# Patient Record
Sex: MALE | Race: WHITE | NOT HISPANIC OR LATINO | Employment: OTHER | URBAN - METROPOLITAN AREA
[De-identification: names, ages, dates, MRNs, and addresses within clinical notes are randomized per-mention and may not be internally consistent; named-entity substitution may affect disease eponyms.]

---

## 2017-02-21 ENCOUNTER — TELEPHONE (OUTPATIENT)
Dept: FAMILY MEDICINE CLINIC | Facility: CLINIC | Age: 67
End: 2017-02-21

## 2017-02-21 RX ORDER — DOXAZOSIN MESYLATE 4 MG/1
4 TABLET ORAL 2 TIMES DAILY
Qty: 60 TABLET | Refills: 1 | Status: SHIPPED | OUTPATIENT
Start: 2017-02-21 | End: 2017-04-10 | Stop reason: SDUPTHER

## 2017-02-21 NOTE — TELEPHONE ENCOUNTER
----- Message from Mariah Ordoñez sent at 2/21/2017 10:05 AM EST -----  Contact: [phone]  PATIENT NEEDS THE NURSE TO GIVE HIM A CALL BACK CONCERNING A ISSUE WITH MEDICATION

## 2017-04-09 DIAGNOSIS — J44.9 CHRONIC OBSTRUCTIVE PULMONARY DISEASE, UNSPECIFIED COPD TYPE (HCC): ICD-10-CM

## 2017-04-10 ENCOUNTER — TELEPHONE (OUTPATIENT)
Dept: FAMILY MEDICINE CLINIC | Facility: CLINIC | Age: 67
End: 2017-04-10

## 2017-04-10 RX ORDER — DOXAZOSIN MESYLATE 4 MG/1
4 TABLET ORAL 2 TIMES DAILY
Qty: 60 TABLET | Refills: 1 | Status: SHIPPED | OUTPATIENT
Start: 2017-04-10 | End: 2017-06-19 | Stop reason: SDUPTHER

## 2017-04-10 RX ORDER — TIOTROPIUM BROMIDE 18 UG/1
CAPSULE ORAL; RESPIRATORY (INHALATION)
Qty: 30 CAPSULE | Refills: 0 | Status: SHIPPED | OUTPATIENT
Start: 2017-04-10 | End: 2017-05-17 | Stop reason: SDUPTHER

## 2017-04-10 RX ORDER — SILDENAFIL CITRATE 100 MG
TABLET ORAL
Qty: 10 TABLET | Refills: 1 | Status: SHIPPED | OUTPATIENT
Start: 2017-04-10 | End: 2017-09-11

## 2017-04-10 NOTE — TELEPHONE ENCOUNTER
----- Message from Remberto Leone sent at 4/10/2017 11:07 AM EDT -----  Regarding: MED REFILL  Contact: [phone]    PT CALLED FOR MED REFILL    doxazosin (CARDURA) 4 MG tablet    BREO ELLIPTA 100-25 MCG/INH aerosol powder       [place], [place]     WOULD LIKE TO BE CALLED WHEN SENT IN

## 2017-04-28 DIAGNOSIS — E29.1 HYPOGONADISM MALE: ICD-10-CM

## 2017-04-28 RX ORDER — TESTOSTERONE CYPIONATE 200 MG/ML
INJECTION, SOLUTION INTRAMUSCULAR
Qty: 2 ML | OUTPATIENT
Start: 2017-04-28

## 2017-05-17 DIAGNOSIS — J44.9 CHRONIC OBSTRUCTIVE PULMONARY DISEASE, UNSPECIFIED COPD TYPE (HCC): ICD-10-CM

## 2017-05-17 RX ORDER — TIOTROPIUM BROMIDE 18 UG/1
CAPSULE ORAL; RESPIRATORY (INHALATION)
Qty: 30 CAPSULE | Refills: 0 | Status: SHIPPED | OUTPATIENT
Start: 2017-05-17 | End: 2017-06-19 | Stop reason: SDUPTHER

## 2017-05-31 ENCOUNTER — OFFICE VISIT (OUTPATIENT)
Dept: FAMILY MEDICINE CLINIC | Facility: CLINIC | Age: 67
End: 2017-05-31

## 2017-05-31 VITALS
SYSTOLIC BLOOD PRESSURE: 110 MMHG | HEIGHT: 67 IN | BODY MASS INDEX: 28.88 KG/M2 | HEART RATE: 65 BPM | OXYGEN SATURATION: 97 % | DIASTOLIC BLOOD PRESSURE: 68 MMHG | TEMPERATURE: 98.1 F | WEIGHT: 184 LBS

## 2017-05-31 DIAGNOSIS — E29.1 HYPOGONADISM MALE: ICD-10-CM

## 2017-05-31 DIAGNOSIS — I10 ESSENTIAL HYPERTENSION: ICD-10-CM

## 2017-05-31 DIAGNOSIS — J43.2 CENTRILOBULAR EMPHYSEMA (HCC): ICD-10-CM

## 2017-05-31 DIAGNOSIS — M25.511 ACUTE PAIN OF RIGHT SHOULDER: ICD-10-CM

## 2017-05-31 DIAGNOSIS — M54.2 NECK PAIN ON RIGHT SIDE: Primary | ICD-10-CM

## 2017-05-31 PROCEDURE — 99214 OFFICE O/P EST MOD 30 MIN: CPT | Performed by: FAMILY MEDICINE

## 2017-05-31 PROCEDURE — 84403 ASSAY OF TOTAL TESTOSTERONE: CPT | Performed by: FAMILY MEDICINE

## 2017-05-31 PROCEDURE — 84402 ASSAY OF FREE TESTOSTERONE: CPT | Performed by: FAMILY MEDICINE

## 2017-05-31 RX ORDER — TESTOSTERONE CYPIONATE 200 MG/ML
300 INJECTION, SOLUTION INTRAMUSCULAR
Qty: 10 ML | Refills: 3
Start: 2017-05-31 | End: 2017-07-20 | Stop reason: SDUPTHER

## 2017-05-31 RX ORDER — ALBUTEROL SULFATE 90 UG/1
2 AEROSOL, METERED RESPIRATORY (INHALATION) EVERY 6 HOURS PRN
Qty: 1 INHALER | Refills: 0 | Status: SHIPPED | OUTPATIENT
Start: 2017-05-31 | End: 2017-07-24 | Stop reason: SDUPTHER

## 2017-06-02 LAB
CONV COMMENT: NORMAL
TESTOST FREE SERPL-MCNC: 7.1 PG/ML (ref 6.6–18.1)
TESTOST SERPL-MCNC: 355 NG/DL (ref 348–1197)

## 2017-06-19 DIAGNOSIS — J44.9 CHRONIC OBSTRUCTIVE PULMONARY DISEASE, UNSPECIFIED COPD TYPE (HCC): ICD-10-CM

## 2017-06-19 RX ORDER — DOXAZOSIN MESYLATE 4 MG/1
TABLET ORAL
Qty: 60 TABLET | Refills: 0 | Status: SHIPPED | OUTPATIENT
Start: 2017-06-19 | End: 2017-07-24 | Stop reason: SDUPTHER

## 2017-06-19 RX ORDER — TIOTROPIUM BROMIDE 18 UG/1
CAPSULE ORAL; RESPIRATORY (INHALATION)
Qty: 60 CAPSULE | Refills: 1 | Status: SHIPPED | OUTPATIENT
Start: 2017-06-19 | End: 2017-07-24 | Stop reason: SDUPTHER

## 2017-07-13 ENCOUNTER — HOSPITAL ENCOUNTER (OUTPATIENT)
Dept: GENERAL RADIOLOGY | Facility: HOSPITAL | Age: 67
Discharge: HOME | End: 2017-07-13
Attending: FAMILY MEDICINE | Admitting: FAMILY MEDICINE

## 2017-07-13 DIAGNOSIS — M54.2 NECK PAIN ON RIGHT SIDE: ICD-10-CM

## 2017-07-13 DIAGNOSIS — M25.511 ACUTE PAIN OF RIGHT SHOULDER: ICD-10-CM

## 2017-07-13 PROCEDURE — 72050 X-RAY EXAM NECK SPINE 4/5VWS: CPT

## 2017-07-14 ENCOUNTER — TELEPHONE (OUTPATIENT)
Dept: FAMILY MEDICINE CLINIC | Facility: CLINIC | Age: 67
End: 2017-07-14

## 2017-07-14 NOTE — TELEPHONE ENCOUNTER
----- Message from Remberto Leone sent at 7/14/2017  3:18 PM EDT -----  Regarding: med question   Contact: [phone]    Pt called to check status of med refill and if insurance had been contacted to cover them     Testosterone Cypionate (DEPOTESTOTERONE CYPIONATE) 200 MG/ML injection

## 2017-07-14 NOTE — TELEPHONE ENCOUNTER
----- Message from Remberto Leone sent at 7/14/2017  3:18 PM EDT -----  Regarding: med question   Contact: [phone]    Pt called to check status of med refill and if insurance had been contacted to cover them     Testosterone Cypionate (DEPOTESTOTERONE CYPIONATE) 200 MG/ML injection

## 2017-07-17 ENCOUNTER — TELEPHONE (OUTPATIENT)
Dept: FAMILY MEDICINE CLINIC | Facility: CLINIC | Age: 67
End: 2017-07-17

## 2017-07-17 NOTE — TELEPHONE ENCOUNTER
----- Message from Mariah Ordoñez sent at 2017  4:26 PM EDT -----  Contact: [phone]  PATIENT SAYS HE WAS OUT OF TOWN AND NEEDS A NEW PT ORDER SINCE HIS IS NOW  SINCE IT IS 30 DAYS OLD

## 2017-07-18 ENCOUNTER — TRANSCRIBE ORDERS (OUTPATIENT)
Dept: PHYSICAL THERAPY | Facility: CLINIC | Age: 67
End: 2017-07-18

## 2017-07-18 DIAGNOSIS — M54.2 PAIN, NECK: Primary | ICD-10-CM

## 2017-07-20 DIAGNOSIS — E29.1 HYPOGONADISM MALE: ICD-10-CM

## 2017-07-20 RX ORDER — TESTOSTERONE CYPIONATE 200 MG/ML
300 INJECTION, SOLUTION INTRAMUSCULAR
Qty: 10 ML | Refills: 3
Start: 2017-07-20 | End: 2017-12-07 | Stop reason: SDUPTHER

## 2017-07-24 DIAGNOSIS — J44.9 CHRONIC OBSTRUCTIVE PULMONARY DISEASE, UNSPECIFIED COPD TYPE (HCC): ICD-10-CM

## 2017-07-24 DIAGNOSIS — J43.2 CENTRILOBULAR EMPHYSEMA (HCC): ICD-10-CM

## 2017-07-24 RX ORDER — DOXAZOSIN MESYLATE 4 MG/1
4 TABLET ORAL NIGHTLY
Qty: 60 TABLET | Refills: 3 | Status: SHIPPED | OUTPATIENT
Start: 2017-07-24 | End: 2017-09-11 | Stop reason: SDUPTHER

## 2017-07-24 RX ORDER — ALBUTEROL SULFATE 90 UG/1
2 AEROSOL, METERED RESPIRATORY (INHALATION) EVERY 6 HOURS PRN
Qty: 1 INHALER | Refills: 3 | Status: SHIPPED | OUTPATIENT
Start: 2017-07-24 | End: 2019-01-07 | Stop reason: SDUPTHER

## 2017-07-26 ENCOUNTER — TREATMENT (OUTPATIENT)
Dept: PHYSICAL THERAPY | Facility: CLINIC | Age: 67
End: 2017-07-26

## 2017-07-26 DIAGNOSIS — S16.1XXS CERVICAL STRAIN, SEQUELA: Primary | ICD-10-CM

## 2017-07-26 PROCEDURE — 97530 THERAPEUTIC ACTIVITIES: CPT | Performed by: PHYSICAL THERAPIST

## 2017-07-26 PROCEDURE — G8981 BODY POS CURRENT STATUS: HCPCS | Performed by: PHYSICAL THERAPIST

## 2017-07-26 PROCEDURE — 97110 THERAPEUTIC EXERCISES: CPT | Performed by: PHYSICAL THERAPIST

## 2017-07-26 PROCEDURE — 97161 PT EVAL LOW COMPLEX 20 MIN: CPT | Performed by: PHYSICAL THERAPIST

## 2017-07-26 PROCEDURE — G8982 BODY POS GOAL STATUS: HCPCS | Performed by: PHYSICAL THERAPIST

## 2017-07-26 NOTE — PROGRESS NOTES
Physical Therapy Initial Evaluation and Plan of Care      Patient: Rey Garrison   : 1950  Diagnosis/ICD-10 Code:  No primary diagnosis found.  Referring practitioner: Sebastian Lovett MD    Subjective Evaluation    History of Present Illness  Mechanism of injury:  R cervical spine pain periodically.  It has been getting worse and more frequent.  He feels stiffness and pain in the R Cervical spine into the R shoulder.  Pain in the neck with pushing, riding , sitting in certain chairs. Laying down helps relieve the pain.    Pain  Current pain ratin  At worst pain ratin (within the past week. )  Quality: dull ache, pulling and throbbing  Relieving factors: change in position, rest, relaxation and support    Hand dominance: left             Objective     Postural Observations  Seated posture: fair  Standing posture: fair  Correction of posture: makes symptoms better    Additional Postural Observation Details  Pt with forward head posture, rounded and elevated shoulders R greater than L.      Palpation     Right   Hypertonic in the levator scapulae, lower trapezius, middle trapezius, rhomboids and upper trapezius. Tenderness of the levator scapulae, lower trapezius, middle trapezius, rhomboids and upper trapezius.   Trigger point to levator scapulae.     Neurological Testing   Sensation   Cervical/Thoracic   Left   Intact: light touch    Right   Intact: light touch    Active Range of Motion   Cervical/Thoracic Spine   Cervical    Left lateral flexion: 20 degrees   Right lateral flexion: 10 degrees with pain  Left rotation: 45 degrees with pain  Right rotation: 40 (R cervcial spine) degrees with pain    Right Shoulder   Flexion: 150 degrees with pain    Passive Range of Motion     Additional Passive Range of Motion Details  PROM improved once MM relaxed on the R UT.  PT had minimal hypomobility noted on the R cervical spine due to MM hypertonicity and MM guarding.   R shoulder PROM limited  on the R by MM guarding and impingement.    Scapular Mobility     Right Shoulder   Scapular mobility: poor  Scapular Mobility with Shoulder to 90° FF   Scapular elevation: moderate  Upward rotation: excessive    Scapular Mobility beyond 90° FF   Scapular elevation: moderate  Upward rotation: excessive    Tests   Cervical     Left   Positive cervical distraction (relief of pain and tightness).          Assessment & Plan     Assessment  Impairments: abnormal muscle firing, abnormal muscle tone, abnormal or restricted ROM, activity intolerance, impaired physical strength, lacks appropriate home exercise program and pain with function  Assessment details: Patient is a 67 year old male who comes to physical therapy with chronic neck issues. Signs and symptoms are consistent with postural dysfuction and scapular MM hypertonicity with poor scapular mechanics and strength.  The patient currently has pain, decreased ROM, decreased strength, and inability to perform all essential functional activities. Pt will benefit from skilled PT services to address the above issues.     Prognosis: good  Prognosis details:   SHORT TERM GOALS:     2 weeks  1. Pt I w/ HEP  2. Pt to demonstrate PROM of the right shoulder to WFL to improve ability to perform ADL's  3. Pt to demonstrate ability to perform 30 minutes continuous activity in the clinic without increase in pain     LONG TERM GOALS:   6 weeks  1. Pt to demonstrate AROM of the right shoulder and the cervical spine to WNL to allow ability to perform all necessary functional activities  2. Pt to demonstrate ability to lift 5# OH with the right arm without increase in pain  3. Pt to report being able to work full duty without increase in pain in the shoulder  4. Pt to tolerate 60 minutes continuous activity in the clinic without increase in pain       Plan  Therapy options: will be seen for skilled physical therapy services  Planned modality interventions: cryotherapy, electrical  stimulation/Russian stimulation, thermotherapy (hydrocollator packs), ultrasound and traction  Planned therapy interventions: therapeutic activities, stretching, strengthening, soft tissue mobilization, postural training, motor coordination training, manual therapy, ADL retraining, body mechanics training, flexibility, functional ROM exercises, home exercise program, joint mobilization and IADL retraining  Treatment plan discussed with: patient  Plan details: Pt will be seen for PT 1 month when he returns from vacation.  He was given HEP and cervical pillow.    Functional Limitations: carrying objects, lifting, sleeping, pulling, pushing, uncomfortable because of pain, reaching behind back, reaching overhead and unable to perform repetitive tasks      Manual Therapy:    8     mins  38737;  Therapeutic Exercise:    12     mins  35655;     Neuromuscular Maryellen:        mins  70862;    Therapeutic Activity:     6     mins  78799;     Gait Training:           mins  47950;     Ultrasound:          mins  66502;    Electrical Stimulation:         mins  67788 ( );  Dry Needling          mins self-pay    Timed Treatment:   26   mins   Total Treatment:     53   mins    PT SIGNATURE: Daniel Galvin, PT   DATE TREATMENT INITIATED: 7/26/2017    Initial Certification  Certification Period: 10/24/2017  I certify that the therapy services are furnished while this patient is under my care.  The services outlined above are required by this patient, and will be reviewed every 90 days.     PHYSICIAN: Sebastian Lovett MD      DATE:     Please sign and return via fax to  .. Thank you, [facility].

## 2017-09-11 ENCOUNTER — OFFICE VISIT (OUTPATIENT)
Dept: FAMILY MEDICINE CLINIC | Facility: CLINIC | Age: 67
End: 2017-09-11

## 2017-09-11 VITALS
SYSTOLIC BLOOD PRESSURE: 124 MMHG | BODY MASS INDEX: 29.19 KG/M2 | TEMPERATURE: 98.7 F | HEART RATE: 64 BPM | WEIGHT: 186 LBS | OXYGEN SATURATION: 94 % | DIASTOLIC BLOOD PRESSURE: 62 MMHG | HEIGHT: 67 IN

## 2017-09-11 DIAGNOSIS — R29.898 WEAKNESS OF RIGHT LOWER EXTREMITY: ICD-10-CM

## 2017-09-11 DIAGNOSIS — N40.1 BENIGN NON-NODULAR PROSTATIC HYPERPLASIA WITH LOWER URINARY TRACT SYMPTOMS: ICD-10-CM

## 2017-09-11 DIAGNOSIS — R31.9 HEMATURIA: Primary | ICD-10-CM

## 2017-09-11 LAB
ALBUMIN SERPL-MCNC: 4.6 G/DL (ref 3.2–4.8)
ALBUMIN/GLOB SERPL: 1.8 G/DL (ref 1.5–2.5)
ALP SERPL-CCNC: 74 U/L (ref 25–100)
ALT SERPL W P-5'-P-CCNC: 28 U/L (ref 7–40)
ANION GAP SERPL CALCULATED.3IONS-SCNC: 8 MMOL/L (ref 3–11)
AST SERPL-CCNC: 22 U/L (ref 0–33)
BASOPHILS # BLD AUTO: 0.04 10*3/MM3 (ref 0–0.2)
BASOPHILS NFR BLD AUTO: 0.5 % (ref 0–1)
BILIRUB BLD-MCNC: NEGATIVE MG/DL
BILIRUB SERPL-MCNC: 0.4 MG/DL (ref 0.3–1.2)
BUN BLD-MCNC: 20 MG/DL (ref 9–23)
BUN/CREAT SERPL: 25 (ref 7–25)
CALCIUM SPEC-SCNC: 9.6 MG/DL (ref 8.7–10.4)
CHLORIDE SERPL-SCNC: 104 MMOL/L (ref 99–109)
CLARITY, POC: CLEAR
CO2 SERPL-SCNC: 28 MMOL/L (ref 20–31)
COLOR UR: YELLOW
CREAT BLD-MCNC: 0.8 MG/DL (ref 0.6–1.3)
DEPRECATED RDW RBC AUTO: 46.3 FL (ref 37–54)
EOSINOPHIL # BLD AUTO: 0.17 10*3/MM3 (ref 0–0.3)
EOSINOPHIL NFR BLD AUTO: 2.1 % (ref 0–3)
ERYTHROCYTE [DISTWIDTH] IN BLOOD BY AUTOMATED COUNT: 12.9 % (ref 11.3–14.5)
GFR SERPL CREATININE-BSD FRML MDRD: 97 ML/MIN/1.73
GLOBULIN UR ELPH-MCNC: 2.5 GM/DL
GLUCOSE BLD-MCNC: 95 MG/DL (ref 70–100)
GLUCOSE UR STRIP-MCNC: NEGATIVE MG/DL
HCT VFR BLD AUTO: 52.8 % (ref 38.9–50.9)
HGB BLD-MCNC: 18.4 G/DL (ref 13.1–17.5)
IMM GRANULOCYTES # BLD: 0.03 10*3/MM3 (ref 0–0.03)
IMM GRANULOCYTES NFR BLD: 0.4 % (ref 0–0.6)
KETONES UR QL: NEGATIVE
LEUKOCYTE EST, POC: NEGATIVE
LYMPHOCYTES # BLD AUTO: 2.37 10*3/MM3 (ref 0.6–4.8)
LYMPHOCYTES NFR BLD AUTO: 28.9 % (ref 24–44)
MCH RBC QN AUTO: 34.2 PG (ref 27–31)
MCHC RBC AUTO-ENTMCNC: 34.8 G/DL (ref 32–36)
MCV RBC AUTO: 98.1 FL (ref 80–99)
MONOCYTES # BLD AUTO: 0.7 10*3/MM3 (ref 0–1)
MONOCYTES NFR BLD AUTO: 8.5 % (ref 0–12)
NEUTROPHILS # BLD AUTO: 4.88 10*3/MM3 (ref 1.5–8.3)
NEUTROPHILS NFR BLD AUTO: 59.6 % (ref 41–71)
NITRITE UR-MCNC: NEGATIVE MG/ML
PH UR: 7.5 [PH] (ref 5–8)
PLATELET # BLD AUTO: 129 10*3/MM3 (ref 150–450)
PMV BLD AUTO: 11 FL (ref 6–12)
POTASSIUM BLD-SCNC: 4.4 MMOL/L (ref 3.5–5.5)
PROT SERPL-MCNC: 7.1 G/DL (ref 5.7–8.2)
PROT UR STRIP-MCNC: NEGATIVE MG/DL
PSA SERPL-MCNC: 0.92 NG/ML (ref 0–4)
RBC # BLD AUTO: 5.38 10*6/MM3 (ref 4.2–5.76)
RBC # UR STRIP: ABNORMAL /UL
SODIUM BLD-SCNC: 140 MMOL/L (ref 132–146)
SP GR UR: 1.01 (ref 1–1.03)
UROBILINOGEN UR QL: NORMAL
WBC NRBC COR # BLD: 8.19 10*3/MM3 (ref 3.5–10.8)

## 2017-09-11 PROCEDURE — 84153 ASSAY OF PSA TOTAL: CPT | Performed by: FAMILY MEDICINE

## 2017-09-11 PROCEDURE — 80053 COMPREHEN METABOLIC PANEL: CPT | Performed by: FAMILY MEDICINE

## 2017-09-11 PROCEDURE — 99214 OFFICE O/P EST MOD 30 MIN: CPT | Performed by: FAMILY MEDICINE

## 2017-09-11 PROCEDURE — 85025 COMPLETE CBC W/AUTO DIFF WBC: CPT | Performed by: FAMILY MEDICINE

## 2017-09-11 PROCEDURE — 81003 URINALYSIS AUTO W/O SCOPE: CPT | Performed by: FAMILY MEDICINE

## 2017-09-11 PROCEDURE — 87086 URINE CULTURE/COLONY COUNT: CPT | Performed by: FAMILY MEDICINE

## 2017-09-11 PROCEDURE — 36415 COLL VENOUS BLD VENIPUNCTURE: CPT | Performed by: FAMILY MEDICINE

## 2017-09-11 RX ORDER — DOXAZOSIN MESYLATE 4 MG/1
8 TABLET ORAL EVERY MORNING
Qty: 60 TABLET | Refills: 11 | Status: SHIPPED | OUTPATIENT
Start: 2017-09-11 | End: 2018-10-04 | Stop reason: SDUPTHER

## 2017-09-11 NOTE — PROGRESS NOTES
"Subjective   Rey Garrison is a 67 y.o. male.     History of Present Illness   The patient is here today with c/o blood in his urine.  States he increased the Doxazosin to two tablets daily and ran out early and was off it for a few days.    States he has had a swollen lymph node in the right axilla.    Also c/o feeling like he has right foot drop.  He has noticed he has had some change in his gait.    The following portions of the patient's history were reviewed and updated as appropriate: allergies, current medications, past social history and problem list.    Review of Systems   Respiratory: Negative for shortness of breath.    Cardiovascular: Negative for chest pain.   Gastrointestinal: Negative for abdominal pain.   Genitourinary: Positive for hematuria.   Neurological: Positive for weakness (right lower extremity).       Objective   /62  Pulse 64  Temp 98.7 °F (37.1 °C)  Ht 67\" (170.2 cm)  Wt 186 lb (84.4 kg)  SpO2 94%  BMI 29.13 kg/m2  Physical Exam   Constitutional: He is oriented to person, place, and time. He appears well-developed and well-nourished.   Eyes: Pupils are equal, round, and reactive to light.   Cardiovascular: Normal rate, regular rhythm, normal heart sounds and intact distal pulses.    No murmur heard.  Pulmonary/Chest: Effort normal and breath sounds normal. No respiratory distress. He has no wheezes. He has no rales.   Lymphadenopathy:     He has no cervical adenopathy.   Neurological: He is alert and oriented to person, place, and time. He has normal reflexes.   Quadriceps strength testing on the right lower extremity reveals it to be grade 4 out of 5 compared to the left.  Hamstring testing on the right side is grade 4 out of 5 compared to the left side.  Dorsiflexion testing of the right foot reveals strength there to be grade 3 out of 5 compared to the left.  Plantar flexion testing of the right lower extremity reveals it to be grade 3 out of 5 compared to the left lower " extremity.   Nursing note and vitals reviewed.      Assessment/Plan   Problem List Items Addressed This Visit        Genitourinary    Benign prostatic hypertrophy      Other Visit Diagnoses     Hematuria    -  Primary    Relevant Orders    POCT urinalysis dipstick, automated (Completed)    Weakness of right lower extremity            Urinalysis today reveals a moderate amount of blood.  This is present on testing 2 years ago.  We'll check a urine culture.  Because of the ongoing hematuria we will check a CT of the abdomen and of the pelvis with and without contrast.    He is had persistent weakness of the right lower extremity.  He believes this is related to some catching in his right hip.  We will check an AP and lateral x-ray of the right hip.    We'll also check a CBC PSA and metabolic panel.  His PSA one year ago was 1.2.  Because of prostate symptoms will increase the doxazosin dosage to 4 mg tablets 2 each night.      Drink plenty fluids.    Continue medications as doing.    Check a PSA,CBC,CMP and Urine culture.    Schedule for a CT scan of the abdomen and pelvis today if possible.  Check an Xray of the Right hip.    Follow up as needed.  The patient will call us later this week.  Should the above studies reveal abnormalities we'll have him return to see us here at the office.          Scribed for Dr Sebastian Lovett by Christiana Rasmussen CMA.            I, Sebastian Lovett MD, personally performed the services described in this documentation, as scribed by Christiana Rasmussen in my presence, and is both accurate and complete.

## 2017-09-13 LAB — BACTERIA SPEC AEROBE CULT: NORMAL

## 2017-09-14 ENCOUNTER — HOSPITAL ENCOUNTER (OUTPATIENT)
Dept: GENERAL RADIOLOGY | Facility: HOSPITAL | Age: 67
Discharge: HOME | End: 2017-09-14
Attending: FAMILY MEDICINE

## 2017-09-14 ENCOUNTER — HOSPITAL ENCOUNTER (OUTPATIENT)
Dept: CT IMAGING | Facility: HOSPITAL | Age: 67
Discharge: HOME | End: 2017-09-14
Attending: FAMILY MEDICINE | Admitting: FAMILY MEDICINE

## 2017-09-14 ENCOUNTER — TELEPHONE (OUTPATIENT)
Dept: FAMILY MEDICINE CLINIC | Facility: CLINIC | Age: 67
End: 2017-09-14

## 2017-09-14 DIAGNOSIS — R31.9 HEMATURIA: ICD-10-CM

## 2017-09-14 DIAGNOSIS — R29.898 WEAKNESS OF RIGHT LOWER EXTREMITY: ICD-10-CM

## 2017-09-14 PROCEDURE — 74178 CT ABD&PLV WO CNTR FLWD CNTR: CPT

## 2017-09-14 PROCEDURE — 73502 X-RAY EXAM HIP UNI 2-3 VIEWS: CPT

## 2017-09-14 PROCEDURE — 0 IOPAMIDOL 61 % SOLUTION: Performed by: FAMILY MEDICINE

## 2017-09-14 RX ADMIN — IOPAMIDOL 95 ML: 612 INJECTION, SOLUTION INTRAVENOUS at 12:50

## 2017-09-14 NOTE — TELEPHONE ENCOUNTER
I spoke to the patient on the telephone.  I advised him that the CT of his abdomen did indeed reveal a large stone in the pelvis of the left kidney.  This is the likely cause of his hematuria.  Recommended referral to a urologist.  I gave him the names of foot to the groups that we use and he will give consideration to this and then call us back when he is ready to 4 referral.  His laboratory work also was remarkable for some polycythemia with a hemoglobin of 18.  He does need reevaluation of his obstructive sleep apnea.  He will call us back when he is ready to get set up for a sleep study.  Her main or of his laboratory studies were unremarkable.

## 2017-09-15 DIAGNOSIS — N20.1 URETERAL STONE: Primary | ICD-10-CM

## 2017-10-02 DIAGNOSIS — G47.33 OSA (OBSTRUCTIVE SLEEP APNEA): Primary | ICD-10-CM

## 2017-10-20 ENCOUNTER — APPOINTMENT (OUTPATIENT)
Dept: CT IMAGING | Facility: HOSPITAL | Age: 67
End: 2017-10-20

## 2017-10-20 ENCOUNTER — HOSPITAL ENCOUNTER (EMERGENCY)
Facility: HOSPITAL | Age: 67
Discharge: HOME | End: 2017-10-20
Attending: EMERGENCY MEDICINE | Admitting: EMERGENCY MEDICINE

## 2017-10-20 VITALS
HEART RATE: 68 BPM | HEIGHT: 68 IN | DIASTOLIC BLOOD PRESSURE: 71 MMHG | SYSTOLIC BLOOD PRESSURE: 118 MMHG | TEMPERATURE: 98.4 F | BODY MASS INDEX: 28.04 KG/M2 | RESPIRATION RATE: 18 BRPM | OXYGEN SATURATION: 94 % | WEIGHT: 185 LBS

## 2017-10-20 DIAGNOSIS — R33.9 URINARY RETENTION: Primary | ICD-10-CM

## 2017-10-20 DIAGNOSIS — N99.89 POSTOPERATIVE SURGICAL COMPLICATION INVOLVING GENITOURINARY SYSTEM ASSOCIATED WITH GENITOURINARY PROCEDURE, UNSPECIFIED COMPLICATION: ICD-10-CM

## 2017-10-20 LAB
ANION GAP SERPL CALCULATED.3IONS-SCNC: 15.1 MMOL/L
BACTERIA UR QL AUTO: ABNORMAL /HPF
BASOPHILS # BLD AUTO: 0.07 10*3/MM3 (ref 0–0.2)
BASOPHILS NFR BLD AUTO: 0.5 % (ref 0–2.5)
BILIRUB UR QL STRIP: NEGATIVE
BUN BLD-MCNC: 14 MG/DL (ref 7–20)
BUN/CREAT SERPL: 15.6 (ref 6.3–21.9)
CALCIUM SPEC-SCNC: 9.4 MG/DL (ref 8.4–10.2)
CHLORIDE SERPL-SCNC: 99 MMOL/L (ref 98–107)
CLARITY UR: ABNORMAL
CO2 SERPL-SCNC: 26 MMOL/L (ref 26–30)
COLOR UR: ABNORMAL
CREAT BLD-MCNC: 0.9 MG/DL (ref 0.6–1.3)
DEPRECATED RDW RBC AUTO: 45.2 FL (ref 37–54)
EOSINOPHIL # BLD AUTO: 0.1 10*3/MM3 (ref 0–0.7)
EOSINOPHIL NFR BLD AUTO: 0.7 % (ref 0–7)
ERYTHROCYTE [DISTWIDTH] IN BLOOD BY AUTOMATED COUNT: 13 % (ref 11.5–14.5)
GFR SERPL CREATININE-BSD FRML MDRD: 85 ML/MIN/1.73
GLUCOSE BLD-MCNC: 117 MG/DL (ref 74–98)
GLUCOSE UR STRIP-MCNC: NEGATIVE MG/DL
HCT VFR BLD AUTO: 47.9 % (ref 42–52)
HGB BLD-MCNC: 16.3 G/DL (ref 14–18)
HGB UR QL STRIP.AUTO: ABNORMAL
HYALINE CASTS UR QL AUTO: ABNORMAL /LPF
IMM GRANULOCYTES # BLD: 0.07 10*3/MM3 (ref 0–0.06)
IMM GRANULOCYTES NFR BLD: 0.5 % (ref 0–0.6)
KETONES UR QL STRIP: NEGATIVE
LEUKOCYTE ESTERASE UR QL STRIP.AUTO: ABNORMAL
LYMPHOCYTES # BLD AUTO: 1.63 10*3/MM3 (ref 0.6–3.4)
LYMPHOCYTES NFR BLD AUTO: 10.6 % (ref 10–50)
MCH RBC QN AUTO: 32.1 PG (ref 27–31)
MCHC RBC AUTO-ENTMCNC: 34 G/DL (ref 30–37)
MCV RBC AUTO: 94.5 FL (ref 80–94)
MONOCYTES # BLD AUTO: 0.76 10*3/MM3 (ref 0–0.9)
MONOCYTES NFR BLD AUTO: 5 % (ref 0–12)
NEUTROPHILS # BLD AUTO: 12.72 10*3/MM3 (ref 2–6.9)
NEUTROPHILS NFR BLD AUTO: 82.7 % (ref 37–80)
NITRITE UR QL STRIP: NEGATIVE
NRBC BLD MANUAL-RTO: 0 /100 WBC (ref 0–0)
PH UR STRIP.AUTO: 6.5 [PH] (ref 5–8)
PLATELET # BLD AUTO: 154 10*3/MM3 (ref 130–400)
PMV BLD AUTO: 10.6 FL (ref 6–12)
POTASSIUM BLD-SCNC: 4.1 MMOL/L (ref 3.5–5.1)
PROT UR QL STRIP: ABNORMAL
RBC # BLD AUTO: 5.07 10*6/MM3 (ref 4.7–6.1)
RBC # UR: ABNORMAL /HPF
REF LAB TEST METHOD: ABNORMAL
SODIUM BLD-SCNC: 136 MMOL/L (ref 137–145)
SP GR UR STRIP: 1.02 (ref 1–1.03)
SQUAMOUS #/AREA URNS HPF: ABNORMAL /HPF
UROBILINOGEN UR QL STRIP: ABNORMAL
WBC NRBC COR # BLD: 15.35 10*3/MM3 (ref 4.8–10.8)
WBC UR QL AUTO: ABNORMAL /HPF

## 2017-10-20 PROCEDURE — 74176 CT ABD & PELVIS W/O CONTRAST: CPT

## 2017-10-20 PROCEDURE — 85025 COMPLETE CBC W/AUTO DIFF WBC: CPT | Performed by: EMERGENCY MEDICINE

## 2017-10-20 PROCEDURE — 87086 URINE CULTURE/COLONY COUNT: CPT | Performed by: EMERGENCY MEDICINE

## 2017-10-20 PROCEDURE — 96360 HYDRATION IV INFUSION INIT: CPT

## 2017-10-20 PROCEDURE — 80048 BASIC METABOLIC PNL TOTAL CA: CPT | Performed by: EMERGENCY MEDICINE

## 2017-10-20 PROCEDURE — 81001 URINALYSIS AUTO W/SCOPE: CPT | Performed by: EMERGENCY MEDICINE

## 2017-10-20 PROCEDURE — 99283 EMERGENCY DEPT VISIT LOW MDM: CPT

## 2017-10-20 RX ADMIN — SODIUM CHLORIDE 1000 ML: 9 INJECTION, SOLUTION INTRAVENOUS at 02:24

## 2017-10-20 NOTE — ED PROVIDER NOTES
Subjective   HPI Comments: 67-year-old male presenting with urinary retention.  He states that earlier this evening he had ESWL and ureteral stenting done.  Afterwards he said only a couple small dribbles of urine.  He has the urge to pee but cannot.  When he does get some urine output is bloody.  He denies any flank pain, abdominal pain, nausea, vomiting, fevers, chills.  He called his urologist and was instructed to come here to the ED for evaluation.      Review of Systems   Constitutional: Negative for chills and fever.   HENT: Negative for congestion, rhinorrhea and sore throat.    Eyes: Negative for pain.   Respiratory: Negative for cough and shortness of breath.    Cardiovascular: Negative for chest pain, palpitations and leg swelling.   Gastrointestinal: Negative for abdominal pain, diarrhea, nausea and vomiting.   Genitourinary: Positive for decreased urine volume, difficulty urinating and hematuria. Negative for discharge, dysuria, penile pain, penile swelling, scrotal swelling and testicular pain.   Musculoskeletal: Negative for arthralgias.   Skin: Negative for rash.   Neurological: Negative for weakness and numbness.   Psychiatric/Behavioral: Negative for behavioral problems.       Past Medical History:   Diagnosis Date   • Benign prostatic hypertrophy    • COPD (chronic obstructive pulmonary disease)    • Coughing up blood     History of   • Depression with anxiety    • Dermatitis    • Hearing loss    • Hiatal hernia    • History of acute pharyngitis    • History of histoplasmosis    • Hypertension    • Hypogonadism male    • Left inguinal hernia    • Mediastinal lymphadenopathy    • TOR (obstructive sleep apnea)    • Sciatica of right side    • Tobacco abuse    • Type 2 diabetes mellitus        Allergies   Allergen Reactions   • Penicillins    • Sulfa Antibiotics        Past Surgical History:   Procedure Laterality Date   • COLONOSCOPY W/ POLYPECTOMY      Pathology consistent with hyperplastic polyp    • INGUINAL HERNIA REPAIR  2014   • KNEE ARTHROSCOPY W/ MENISCAL REPAIR      Medial and lateral meniscus repair       Family History   Problem Relation Age of Onset   • Dementia Mother    • Prostate cancer Father        Social History     Social History   • Marital status:      Spouse name: N/A   • Number of children: N/A   • Years of education: N/A     Social History Main Topics   • Smoking status: Current Every Day Smoker     Packs/day: 1.25     Years: 40.00     Types: Cigarettes   • Smokeless tobacco: Never Used   • Alcohol use 3.0 oz/week     5 Standard drinks or equivalent per week      Comment: 5 alcoholic beverages per week   • Drug use: Yes     Special: Marijuana      Comment: social use   • Sexual activity: Yes     Partners: Male     Other Topics Concern   • None     Social History Narrative           Objective   Physical Exam   Constitutional: He is oriented to person, place, and time. He appears well-developed and well-nourished. No distress.   HENT:   Head: Normocephalic and atraumatic.   Right Ear: External ear normal.   Left Ear: External ear normal.   Nose: Nose normal.   Mouth/Throat: Oropharynx is clear and moist.   Eyes: Conjunctivae and EOM are normal. Pupils are equal, round, and reactive to light.   Neck: Normal range of motion. Neck supple.   Cardiovascular: Normal rate, regular rhythm, normal heart sounds and intact distal pulses.    Pulmonary/Chest: Effort normal and breath sounds normal. No respiratory distress.   Abdominal: Soft. Bowel sounds are normal. He exhibits no distension. There is no tenderness. There is no rebound and no guarding.   Musculoskeletal: Normal range of motion. He exhibits no edema, tenderness or deformity.   Neurological: He is alert and oriented to person, place, and time.   Skin: Skin is warm and dry. No rash noted.   Psychiatric: He has a normal mood and affect. His behavior is normal.   Nursing note and vitals reviewed.      Procedures         ED  Course  ED Course                  MDM  Number of Diagnoses or Management Options  Postoperative surgical complication involving genitourinary system associated with genitourinary procedure, unspecified complication:   Urinary retention:   Diagnosis management comments: 67-year-old male with urinary retention.  Well-developed, well-nourished elderly man in no distress with normal vital signs and an otherwise nonfocal exam.  We'll check labs, CT scan to rule out complications from the procedure, will check a bladder scan and likely place a Jiménez catheter.  Disposition pending workup.    DDX: Urinary retention, UTI, postoperative complication    Labs look pretty good.  UA shows only blood, no signs of infection.  CT scan shows stent in good position without any other acute complications, note made of a large prostate.  Jiménez catheter was placed and drained over 1 L of urine.  He is feeling much better.  We'll discharge home with urology follow-up.      Final diagnoses:   Urinary retention   Postoperative surgical complication involving genitourinary system associated with genitourinary procedure, unspecified complication            Daniel Patricia MD  10/20/17 [id number]

## 2017-10-21 LAB — BACTERIA SPEC AEROBE CULT: NO GROWTH

## 2017-11-29 DIAGNOSIS — E29.1 HYPOGONADISM MALE: ICD-10-CM

## 2017-11-29 RX ORDER — TESTOSTERONE CYPIONATE 200 MG/ML
INJECTION, SOLUTION INTRAMUSCULAR
Qty: 10 ML | OUTPATIENT
Start: 2017-11-29

## 2017-11-30 DIAGNOSIS — E29.1 HYPOGONADISM MALE: ICD-10-CM

## 2017-11-30 RX ORDER — TESTOSTERONE CYPIONATE 200 MG/ML
300 INJECTION, SOLUTION INTRAMUSCULAR
Qty: 10 ML | Refills: 3
Start: 2017-11-30

## 2017-12-07 DIAGNOSIS — E29.1 HYPOGONADISM MALE: ICD-10-CM

## 2017-12-07 RX ORDER — TESTOSTERONE CYPIONATE 200 MG/ML
300 INJECTION, SOLUTION INTRAMUSCULAR
Qty: 10 ML | Refills: 0
Start: 2017-12-07 | End: 2017-12-21 | Stop reason: SDUPTHER

## 2017-12-21 ENCOUNTER — OFFICE VISIT (OUTPATIENT)
Dept: FAMILY MEDICINE CLINIC | Facility: CLINIC | Age: 67
End: 2017-12-21

## 2017-12-21 VITALS
HEART RATE: 78 BPM | TEMPERATURE: 98.3 F | OXYGEN SATURATION: 95 % | BODY MASS INDEX: 27.74 KG/M2 | WEIGHT: 183 LBS | DIASTOLIC BLOOD PRESSURE: 58 MMHG | HEIGHT: 68 IN | SYSTOLIC BLOOD PRESSURE: 118 MMHG

## 2017-12-21 DIAGNOSIS — Z23 IMMUNIZATION DUE: ICD-10-CM

## 2017-12-21 DIAGNOSIS — E29.1 HYPOGONADISM MALE: Primary | ICD-10-CM

## 2017-12-21 PROCEDURE — G0009 ADMIN PNEUMOCOCCAL VACCINE: HCPCS | Performed by: FAMILY MEDICINE

## 2017-12-21 PROCEDURE — 99213 OFFICE O/P EST LOW 20 MIN: CPT | Performed by: FAMILY MEDICINE

## 2017-12-21 PROCEDURE — 90662 IIV NO PRSV INCREASED AG IM: CPT | Performed by: FAMILY MEDICINE

## 2017-12-21 PROCEDURE — G0008 ADMIN INFLUENZA VIRUS VAC: HCPCS | Performed by: FAMILY MEDICINE

## 2017-12-21 PROCEDURE — 90670 PCV13 VACCINE IM: CPT | Performed by: FAMILY MEDICINE

## 2017-12-21 RX ORDER — FINASTERIDE 5 MG/1
TABLET, FILM COATED ORAL
Refills: 3 | COMMUNITY
Start: 2017-10-25 | End: 2019-09-16 | Stop reason: SDUPTHER

## 2017-12-21 RX ORDER — TESTOSTERONE CYPIONATE 200 MG/ML
300 INJECTION, SOLUTION INTRAMUSCULAR
Qty: 10 ML | Refills: 3
Start: 2017-12-21 | End: 2018-03-29 | Stop reason: SDUPTHER

## 2017-12-21 NOTE — PROGRESS NOTES
"Subjective   Rey Garrison Jr is a 67 y.o. male.     History of Present Illness   The patient is here for a follow up on Hypogonadism.    He states he is doing well.  Denies any chest pain or shortness of breath.    Taking Testosterone Cypionate 200 mg/ML 1.5 ML every two weeks.      The following portions of the patient's history were reviewed and updated as appropriate: allergies, current medications, past social history and problem list.    Review of Systems   Constitutional: Negative for fatigue and unexpected weight change.   Respiratory: Negative for shortness of breath.    Cardiovascular: Negative for chest pain.   Genitourinary: Negative for difficulty urinating and dysuria.       Objective   /58  Pulse 78  Temp 98.3 °F (36.8 °C)  Ht 172.7 cm (67.99\")  Wt 83 kg (183 lb)  SpO2 95%  BMI 27.83 kg/m2  Physical Exam   Constitutional: He appears well-developed and well-nourished.   Cardiovascular: Normal rate and regular rhythm.    No murmur heard.  Pulmonary/Chest: Effort normal and breath sounds normal. He has no wheezes. He has no rales.   Abdominal: There is no tenderness.   Nursing note and vitals reviewed.      Assessment/Plan   Problem List Items Addressed This Visit        Endocrine    Hypogonadism male - Primary      Other Visit Diagnoses     Immunization due                  Drink plenty fluids.    Continue medications as doing.    Written Rx for Testosterone Cypionate 200 mg/ML 1.5 ML every 2 weeks #10 ML +3.    Given a Flu vaccine and Prevnar 13 today.    Follow up in 6 months.          Scribed for Dr Sebastian Lovett by Christiana Rasmussen CMA.    "

## 2018-01-09 ENCOUNTER — CONSULT (OUTPATIENT)
Dept: SLEEP MEDICINE | Facility: HOSPITAL | Age: 68
End: 2018-01-09

## 2018-01-09 VITALS
BODY MASS INDEX: 28.4 KG/M2 | DIASTOLIC BLOOD PRESSURE: 70 MMHG | HEIGHT: 68 IN | WEIGHT: 187.39 LBS | HEART RATE: 76 BPM | OXYGEN SATURATION: 93 % | SYSTOLIC BLOOD PRESSURE: 126 MMHG

## 2018-01-09 DIAGNOSIS — R06.83 SNORING: Primary | ICD-10-CM

## 2018-01-09 DIAGNOSIS — J43.2 CENTRILOBULAR EMPHYSEMA (HCC): ICD-10-CM

## 2018-01-09 DIAGNOSIS — Z72.0 TOBACCO ABUSE: ICD-10-CM

## 2018-01-09 DIAGNOSIS — R29.818 SUSPECTED SLEEP APNEA: ICD-10-CM

## 2018-01-09 PROCEDURE — 99204 OFFICE O/P NEW MOD 45 MIN: CPT | Performed by: INTERNAL MEDICINE

## 2018-01-09 NOTE — PROGRESS NOTES
"  Rey Garrison Jr is a 67 y.o. male.   Chief Complaint   Patient presents with   • Sleeping Problem       HPI     67 y.o. male seen in consultation at the request of Sebastian Lovett MD for evaluation of the above.     He has a remote history of obstructive sleep apnea.  He thinks he was diagnosed roughly 12 years ago.  He works CPAP for about 10 years but quit wearing it about 2 years ago.  He says he lost 60 pounds and the machine was increasingly noisy and he felt he probably didn't need it anymore.    He is here since he is concerned that he may still need therapy though he has very little in the way of daytime somnolence.  He does have increasing awakenings at night and has been told he snores loudly though he currently sleeps alone.  He says he feels \"okay\" in the morning.  He has underlying COPD and continues to smoke about a pack and a half of cigarettes per day.    Further details are as follows:    [place] Scale is: 4/24    Estimated average amount of sleep per night: 7  Number of times he wakes up at night: 3  Difficulty falling back asleep: no  It usually takes 5 minutes to go to sleep.  He feels sleepy upon waking up: no  Rotating or night shift work: no    Drowsiness/Sleepiness:  He exhibits the following:  none    Snoring/Breathing:  He exhibits the following:  loud snoring  quits breathing at night  awakens gasping for breath    Reflux:  He describes the following:  none    Narcolepsy:  He exhibits the following:  none    RLS/PLMs:  He describes the following:  moves or jerks during sleep    Insomnia:  He describes the following:  frequent awakenings    Parasomnia:  He exhibits the following:  excessive sweating while sleeping    Neurological:  He has a history of the following:  none    Weight:  Weight change in the last year:  gain: 0 lbs      The patient's relevant past medical, surgical, family, and social history reviewed and updated in Epic as appropriate.    Current medications are: " "  Current Outpatient Prescriptions:   •  albuterol (PROAIR HFA) 108 (90 BASE) MCG/ACT inhaler, Inhale 2 puffs Every 6 (Six) Hours As Needed for Wheezing., Disp: 1 inhaler, Rfl: 3  •  aspirin (ASPIRIN LOW DOSE) 81 MG EC tablet, Take 81 mg by mouth Daily., Disp: , Rfl:   •  BREO ELLIPTA 100-25 MCG/INH aerosol powder , INHALE 1 PUFF BY MOUTH ONCE DAILY, Disp: 60 each, Rfl: 2  •  doxazosin (CARDURA) 4 MG tablet, Take 2 tablets by mouth Every Morning., Disp: 60 tablet, Rfl: 11  •  finasteride (PROSCAR) 5 MG tablet, TAKE 1 TABLET BY MOUTH DAILY, Disp: , Rfl: 3  •  Fluticasone Furoate-Vilanterol (BREO ELLIPTA) 100-25 MCG/INH aerosol powder , Inhale 1 puff Daily., Disp: 28 each, Rfl: 0  •  Syringe 22G X 1\" 3 ML misc, Use one every two weeks to inject the testosterone., Disp: 10 each, Rfl: 1  •  Testosterone Cypionate (DEPOTESTOTERONE CYPIONATE) 200 MG/ML injection, Inject 1.5 mL into the shoulder, thigh, or buttocks Every 14 (Fourteen) Days., Disp: 10 mL, Rfl: 3  •  tiotropium (SPIRIVA HANDIHALER) 18 MCG per inhalation capsule, Place 1 capsule into inhaler and inhale Daily., Disp: 60 capsule, Rfl: 1.    Review of Systems    Review of Systems  ROS documented in patient questionnaire ×12 systems.  Reviewed with patient.  Otherwise negative except as noted in HPI.    Physical Exam    Blood pressure 126/70, pulse 76, height 172.7 cm (67.99\"), weight 85 kg (187 lb 6.3 oz), SpO2 93 %. Body mass index is 28.5 kg/(m^2).    Physical Exam   Constitutional: He is oriented to person, place, and time. He appears well-developed and well-nourished.   HENT:   Head: Normocephalic and atraumatic.   Nose: Nose normal.   Mouth/Throat: Oropharynx is clear and moist. No oropharyngeal exudate.   Class IV airway  Mild pharyngeal irritation   Eyes: Conjunctivae are normal. Pupils are equal, round, and reactive to light. No scleral icterus.   Neck: Neck supple. No tracheal deviation present. No thyromegaly present.   Cardiovascular: Normal rate and " regular rhythm.  Exam reveals no gallop and no friction rub.    No murmur heard.  Pulmonary/Chest: Effort normal. No respiratory distress. He has no wheezes. He has no rales.   Abdominal: Soft. Bowel sounds are normal.   Musculoskeletal: He exhibits no edema or deformity.   Neurological: He is alert and oriented to person, place, and time.   Skin: Skin is warm and dry. No rash noted.   Psychiatric: He has a normal mood and affect. His behavior is normal. Judgment and thought content normal.   Nursing note and vitals reviewed.      ASSESSMENT:    Problem List Items Addressed This Visit        Pulmonary Problems    Snoring - Primary    Relevant Orders    Polysomnography 4 or More Parameters    COPD (chronic obstructive pulmonary disease)       Other    Suspected sleep apnea    Relevant Orders    Polysomnography 4 or More Parameters    Tobacco abuse          History of obstructive sleep apnea and subsequent weight loss and discontinuation of CPAP therapy remotely.  I don't have any of these records.  He now has continued nocturnal awakenings and snoring and is concerned that he may have untreated obstructive sleep apnea    PLAN:    1. Polysomnogram  2. Reinitiate CPAP therapy if appropriate based upon polysomnogram  3. Further weight loss would be useful and we discussed this  4. Smoking cessation  5. Will make further recommendations after the results of his polysomnogram is available.    I have reviewed the results of my evaluation and impression and discussed my recommendations in detail with the patient.      Signed by  Patel Xie MD    January 9, 2018      CC: MD Bony Santiago Timothy R, MD

## 2018-01-24 ENCOUNTER — HOSPITAL ENCOUNTER (OUTPATIENT)
Dept: SLEEP MEDICINE | Facility: HOSPITAL | Age: 68
Discharge: HOME | End: 2018-01-24
Attending: INTERNAL MEDICINE | Admitting: INTERNAL MEDICINE

## 2018-01-24 VITALS
HEART RATE: 73 BPM | BODY MASS INDEX: 29.37 KG/M2 | WEIGHT: 193.78 LBS | HEIGHT: 68 IN | OXYGEN SATURATION: 91 % | SYSTOLIC BLOOD PRESSURE: 128 MMHG | DIASTOLIC BLOOD PRESSURE: 65 MMHG

## 2018-01-24 DIAGNOSIS — R06.83 SNORING: ICD-10-CM

## 2018-01-24 DIAGNOSIS — R29.818 SUSPECTED SLEEP APNEA: ICD-10-CM

## 2018-01-24 PROCEDURE — 95810 POLYSOM 6/> YRS 4/> PARAM: CPT

## 2018-01-24 PROCEDURE — 95810 POLYSOM 6/> YRS 4/> PARAM: CPT | Performed by: INTERNAL MEDICINE

## 2018-02-01 DIAGNOSIS — E29.1 HYPOGONADISM MALE: ICD-10-CM

## 2018-02-01 RX ORDER — SYRINGE WITH NEEDLE, 1 ML 25GX5/8"
SYRINGE, EMPTY DISPOSABLE MISCELLANEOUS
Qty: 10 EACH | Refills: 0 | Status: SHIPPED | OUTPATIENT
Start: 2018-02-01 | End: 2018-11-05

## 2018-02-07 ENCOUNTER — TELEPHONE (OUTPATIENT)
Dept: FAMILY MEDICINE CLINIC | Facility: CLINIC | Age: 68
End: 2018-02-07

## 2018-02-07 NOTE — TELEPHONE ENCOUNTER
Spoke with Logan and confirmed the substitution.  ----- Message from Louisa Dawson sent at 2/6/2018 12:18 PM EST -----  Regarding: CHANGE GAUGE OF NEEDLE  Contact: [phone]  LOGAN WITH [facility] WANTS TO KNOW IF WE CAN SUBSTITUTE TO 23 GAUGE.  CALL PHARMACY

## 2018-03-20 ENCOUNTER — OFFICE VISIT (OUTPATIENT)
Dept: SLEEP MEDICINE | Facility: HOSPITAL | Age: 68
End: 2018-03-20

## 2018-03-20 VITALS
HEIGHT: 68 IN | HEART RATE: 64 BPM | OXYGEN SATURATION: 93 % | WEIGHT: 193 LBS | SYSTOLIC BLOOD PRESSURE: 138 MMHG | DIASTOLIC BLOOD PRESSURE: 74 MMHG | BODY MASS INDEX: 29.25 KG/M2

## 2018-03-20 DIAGNOSIS — R06.83 SNORING: Primary | ICD-10-CM

## 2018-03-20 PROBLEM — G47.33 OSA (OBSTRUCTIVE SLEEP APNEA): Status: RESOLVED | Noted: 2018-03-20 | Resolved: 2018-03-20

## 2018-03-20 PROCEDURE — 99212 OFFICE O/P EST SF 10 MIN: CPT | Performed by: INTERNAL MEDICINE

## 2018-03-20 NOTE — PROGRESS NOTES
"Subjective:     Chief Complaint:   Chief Complaint   Patient presents with   • Follow-up       HPI:    Rey Garrison Jr is a 68 y.o. male here for follow-up of His recent polysomnogram in January.    He has a history of obstructive sleep apnea but has had significant weight loss.  He wanted to know if he needed to continue CPAP therapy.  He has not used in 2 years.  He has noted some awakenings at night and thinks he may snore.  He did not complain of excessive daytime somnolence.    His polysomnogram revealed no evidence of ongoing obstructive sleep apnea.      Current medications are:   Current Outpatient Prescriptions:   •  albuterol (PROAIR HFA) 108 (90 BASE) MCG/ACT inhaler, Inhale 2 puffs Every 6 (Six) Hours As Needed for Wheezing., Disp: 1 inhaler, Rfl: 3  •  aspirin (ASPIRIN LOW DOSE) 81 MG EC tablet, Take 81 mg by mouth Daily., Disp: , Rfl:   •  B-D 3CC LUER-JAVI SYR 93WX2-9/2 21G X 1-1/2\" 3 ML misc, USE ONE EVERY TWO WEEKS TO INJECT THE TESTOSTERONE., Disp: 10 each, Rfl: 0  •  B-D 3CC LUER-JAVI SYR 45DJ4-6/4 2G X 1-1/4\" 3 ML misc, USE WITH TESTOSTERONE, Disp: 10 each, Rfl: 0  •  doxazosin (CARDURA) 4 MG tablet, Take 2 tablets by mouth Every Morning., Disp: 60 tablet, Rfl: 11  •  finasteride (PROSCAR) 5 MG tablet, TAKE 1 TABLET BY MOUTH DAILY, Disp: , Rfl: 3  •  Fluticasone Furoate-Vilanterol (BREO ELLIPTA) 100-25 MCG/INH aerosol powder , Inhale 1 puff Daily., Disp: 28 each, Rfl: 0  •  Fluticasone Furoate-Vilanterol (BREO ELLIPTA) 100-25 MCG/INH aerosol powder , Take 1 puff by mouth Daily., Disp: 60 each, Rfl: 2  •  Testosterone Cypionate (DEPOTESTOTERONE CYPIONATE) 200 MG/ML injection, Inject 1.5 mL into the shoulder, thigh, or buttocks Every 14 (Fourteen) Days., Disp: 10 mL, Rfl: 3  •  tiotropium (SPIRIVA HANDIHALER) 18 MCG per inhalation capsule, Place 1 capsule into inhaler and inhale Daily., Disp: 60 capsule, Rfl: 1.      The patient's relevant past medical, surgical, family and social history were " reviewed and updated in Epic as appropriate.     ROS:    Review of Systems   Constitutional: Negative for fatigue.   Respiratory: Negative for apnea.          Objective:    Physical Exam   Constitutional: He is oriented to person, place, and time. He appears well-developed and well-nourished.   HENT:   Head: Normocephalic and atraumatic.   Mouth/Throat: Oropharynx is clear and moist.   Musculoskeletal: He exhibits no edema.   Neurological: He is alert and oriented to person, place, and time.   Skin: Skin is warm and dry.   Psychiatric: He has a normal mood and affect. His behavior is normal.   Vitals reviewed.        Assessment:    Problem List Items Addressed This Visit        Pulmonary Problems    Snoring - Primary      Other Visit Diagnoses    None.         History obstructive sleep apnea that has apparently resolved with weight loss based upon his most recent polysomnogram in January.    Plan:     1. Discussed the above with the patient and answered all questions.  2. Recommended smoking cessation.  3. Recommended avoidance of any weight gain.  4. If he has any increasing symptoms of sleep apnea in the future he will reconsult me.    Discussed in detail with the patient.      Signed by  Patel Xie MD

## 2018-03-29 DIAGNOSIS — E29.1 HYPOGONADISM MALE: ICD-10-CM

## 2018-03-30 RX ORDER — TESTOSTERONE CYPIONATE 200 MG/ML
300 INJECTION, SOLUTION INTRAMUSCULAR
Qty: 10 ML | Refills: 3
Start: 2018-03-30 | End: 2019-09-16 | Stop reason: SDUPTHER

## 2018-03-30 RX ORDER — SILDENAFIL 100 MG/1
100 TABLET, FILM COATED ORAL DAILY PRN
Qty: 5 TABLET | Refills: 5 | Status: SHIPPED | OUTPATIENT
Start: 2018-03-30 | End: 2019-01-08 | Stop reason: SDUPTHER

## 2018-04-13 ENCOUNTER — TELEPHONE (OUTPATIENT)
Dept: FAMILY MEDICINE CLINIC | Facility: CLINIC | Age: 68
End: 2018-04-13

## 2018-04-13 NOTE — TELEPHONE ENCOUNTER
----- Message from Remberto Ramos Rep sent at 4/6/2018  2:39 PM EDT -----  Regarding: PA  Contact: [phone]  PT THINKS HE NEEDS A PA FOR HIS Testosterone Cypionate (DEPOTESTOTERONE CYPIONATE) 200 MG/ML injection.

## 2018-07-06 DIAGNOSIS — E29.1 HYPOGONADISM MALE: ICD-10-CM

## 2018-07-06 DIAGNOSIS — J43.2 CENTRILOBULAR EMPHYSEMA (HCC): ICD-10-CM

## 2018-07-06 RX ORDER — TESTOSTERONE CYPIONATE 200 MG/ML
INJECTION, SOLUTION INTRAMUSCULAR
Qty: 10 ML | OUTPATIENT
Start: 2018-07-06

## 2018-07-17 ENCOUNTER — TELEPHONE (OUTPATIENT)
Dept: FAMILY MEDICINE CLINIC | Facility: CLINIC | Age: 68
End: 2018-07-17

## 2018-07-19 NOTE — TELEPHONE ENCOUNTER
Patient is scheduled for an initial medicare wellness visit in October, per his request.  Please mail wellness packet closer to appointment time.  Thank you.

## 2018-08-20 RX ORDER — SYRINGE WITH NEEDLE, 1 ML 25GX5/8"
SYRINGE, EMPTY DISPOSABLE MISCELLANEOUS
Qty: 100 EACH | Refills: 0 | Status: SHIPPED | OUTPATIENT
Start: 2018-08-20 | End: 2022-04-21

## 2018-08-29 ENCOUNTER — OFFICE VISIT (OUTPATIENT)
Dept: FAMILY MEDICINE CLINIC | Facility: CLINIC | Age: 68
End: 2018-08-29

## 2018-08-29 VITALS
HEIGHT: 68 IN | DIASTOLIC BLOOD PRESSURE: 88 MMHG | SYSTOLIC BLOOD PRESSURE: 128 MMHG | OXYGEN SATURATION: 98 % | TEMPERATURE: 98.1 F | WEIGHT: 191 LBS | RESPIRATION RATE: 20 BRPM | BODY MASS INDEX: 28.95 KG/M2 | HEART RATE: 72 BPM

## 2018-08-29 DIAGNOSIS — Z00.00 MEDICARE ANNUAL WELLNESS VISIT, INITIAL: Primary | ICD-10-CM

## 2018-08-29 DIAGNOSIS — M79.604 RIGHT LEG PAIN: ICD-10-CM

## 2018-08-29 DIAGNOSIS — J43.2 CENTRILOBULAR EMPHYSEMA (HCC): ICD-10-CM

## 2018-08-29 DIAGNOSIS — L30.9 DERMATITIS: ICD-10-CM

## 2018-08-29 DIAGNOSIS — R29.898 RIGHT LEG WEAKNESS: ICD-10-CM

## 2018-08-29 PROCEDURE — G0438 PPPS, INITIAL VISIT: HCPCS | Performed by: FAMILY MEDICINE

## 2018-08-29 PROCEDURE — 99213 OFFICE O/P EST LOW 20 MIN: CPT | Performed by: FAMILY MEDICINE

## 2018-09-11 ENCOUNTER — OFFICE VISIT (OUTPATIENT)
Dept: ORTHOPEDIC SURGERY | Facility: CLINIC | Age: 68
End: 2018-09-11

## 2018-09-11 VITALS — HEIGHT: 68 IN | HEART RATE: 87 BPM | BODY MASS INDEX: 28.94 KG/M2 | OXYGEN SATURATION: 98 % | WEIGHT: 190.92 LBS

## 2018-09-11 DIAGNOSIS — M79.604 RIGHT LEG PAIN: ICD-10-CM

## 2018-09-11 DIAGNOSIS — M54.31 SCIATICA OF RIGHT SIDE: ICD-10-CM

## 2018-09-11 DIAGNOSIS — G95.9 CERVICAL MYELOPATHY (HCC): Primary | ICD-10-CM

## 2018-09-11 PROCEDURE — 99204 OFFICE O/P NEW MOD 45 MIN: CPT | Performed by: ORTHOPAEDIC SURGERY

## 2018-09-11 NOTE — PROGRESS NOTES
"    [facility] Note    Subjective     Pain of the Right Leg (Patient does not have much pain but cannot pin point what the problem is. He states he feels like he has to \"drag\" the leg sometimes.  This has been going on for 1-2 years but has got worse the last few months. He has had no treatment thus far.)      AHSISH Garrison Jr is a 68 y.o. male.  Patient is here today for weakness in his right lower extremity.  He feels clumsy and feels like he has weakness in the right leg to the point where he can't walk normally.  He says he stumbles from time to time.  He has a history of a cervical spine problem but tells me he has a pillow and some exercises that he uses and that seems to take care of it.  This is gotten worse over the past few months.  He denies bowel or bladder disturbance but he does have some numbness on the medial side of his thighs.  Patient also tells me that when he tries to open his left hand and close it very quickly, he has tremors in the hand.     Past Medical History:   Diagnosis Date   • Benign prostatic hypertrophy    • COPD (chronic obstructive pulmonary disease) (CMS/HCC)    • Coughing up blood     History of   • Depression with anxiety    • Dermatitis    • Hearing loss    • Hiatal hernia    • History of acute pharyngitis    • History of histoplasmosis    • Hypertension    • Hypogonadism male    • Left inguinal hernia    • Mediastinal lymphadenopathy    • Sciatica of right side    • Tobacco abuse    • Type 2 diabetes mellitus (CMS/HCC)       Past Surgical History:   Procedure Laterality Date   • COLONOSCOPY W/ POLYPECTOMY      Pathology consistent with hyperplastic polyp   • HERNIA REPAIR     • INGUINAL HERNIA REPAIR  2014   • KNEE ARTHROSCOPY W/ MENISCAL REPAIR      Medial and lateral meniscus repair   • NASAL POLYP EXCISION        Family History   Problem Relation Age of Onset   • Dementia Mother    • Prostate cancer Father      Social History     Social History " "  • Marital status:      Spouse name: N/A   • Number of children: N/A   • Years of education: N/A     Occupational History   • Not on file.     Social History Main Topics   • Smoking status: Current Every Day Smoker     Packs/day: 1.25     Years: 40.00     Types: Cigarettes   • Smokeless tobacco: Never Used   • Alcohol use 3.0 oz/week     5 Standard drinks or equivalent per week      Comment: 5 alcoholic beverages per week   • Drug use: Yes     Types: Marijuana      Comment: social use   • Sexual activity: Yes     Partners: Male     Other Topics Concern   • Not on file     Social History Narrative   • No narrative on file      Current Outpatient Prescriptions on File Prior to Visit   Medication Sig Dispense Refill   • albuterol (PROAIR HFA) 108 (90 BASE) MCG/ACT inhaler Inhale 2 puffs Every 6 (Six) Hours As Needed for Wheezing. 1 inhaler 3   • aspirin (ASPIRIN LOW DOSE) 81 MG EC tablet Take 81 mg by mouth Daily.     • B Complex Vitamins (B-COMPLEX/B-12 PO) Take  by mouth.     • B-D 3CC LUER-JAVI SYR 65KH0-8/2 21G X 1-1/2\" 3 ML misc USE ONE EVERY TWO WEEKS TO INJECT THE TESTOSTERONE. 10 each 0   • B-D 3CC LUER-JAIV SYR 68VR7-5/4 2G X 1-1/4\" 3 ML misc USE WITH TESTOSTERONE 10 each 0   • B-D 3CC LUER-JAVI SYR 61ZY3-8/2 23G X 1-1/2\" 3 ML misc USE WITH TESTOSTERONE 100 each 0   • BREO ELLIPTA 100-25 MCG/INH aerosol powder  INHALE 1 PUFF BY MOUTH ONCE DAILY 60 each 0   • doxazosin (CARDURA) 4 MG tablet Take 2 tablets by mouth Every Morning. 60 tablet 11   • finasteride (PROSCAR) 5 MG tablet TAKE 1 TABLET BY MOUTH DAILY  3   • Fluticasone Furoate-Vilanterol (BREO ELLIPTA) 100-25 MCG/INH aerosol powder  Inhale 1 puff Daily. 28 each 0   • Multiple Vitamin (MULTI-VITAMIN DAILY PO) Take  by mouth.     • sildenafil (VIAGRA) 100 MG tablet Take 1 tablet by mouth Daily As Needed for erectile dysfunction. 5 tablet 5   • Testosterone Cypionate (DEPOTESTOTERONE CYPIONATE) 200 MG/ML injection Inject 1.5 mL into the shoulder, " "thigh, or buttocks Every 14 (Fourteen) Days. 10 mL 3   • tiotropium (SPIRIVA HANDIHALER) 18 MCG per inhalation capsule Place 1 capsule into inhaler and inhale Daily. 60 capsule 1     No current facility-administered medications on file prior to visit.       Allergies   Allergen Reactions   • Penicillamine Unknown (See Comments)   • Penicillins    • Sulfa Antibiotics         The following portions of the patient's history were reviewed and updated as appropriate: allergies, current medications, past family history, past medical history, past social history, past surgical history and problem list.    Review of Systems   Constitutional: Positive for fatigue.   HENT: Positive for hearing loss.    Eyes: Negative.    Respiratory: Negative.    Cardiovascular: Negative.    Gastrointestinal: Negative.    Endocrine: Negative.    Genitourinary: Negative.    Musculoskeletal: Positive for arthralgias (leg pain), gait problem, joint swelling and neck pain.   Skin: Negative.    Allergic/Immunologic: Negative.    Hematological: Negative.    Psychiatric/Behavioral: Negative.         Objective      Physical Exam  Pulse 87   Ht 172.7 cm (67.99\")   Wt 86.6 kg (190 lb 14.7 oz)   SpO2 98%   BMI 29.04 kg/m²     Body mass index is 29.04 kg/m².    General  Mental Status - alert  General Appearance - cooperative, well groomed, not in acute distress  Orientation - Oriented X3  Build & Nutrition - well developed and well nourished  Posture - normal posture  Gait - normal gait     Integumentary  Global Assessment  Examination of related systems reveals - no lymphadenopathy  Ears:  No abnormality  Nose:  No mucous drainage  General Characteristics  Overall examination of the patient's skin reveals - no rashes, no evidence of scars, no suspicious lesions and no bruises.  Color - normal coloration of skin.    Ortho Exam  Peripheral Vascular   Upper Extremities    No cyanotic nail beds    Pink nail beds and rapid capillary " refill   Palpation    Radial Pulse - Bilaterally normal    Neurologic   Sensory - normal   Muscle Strength and Tone    Right iliopsoas - 4/5    Left iliopsoas - 4/5    Right quadriceps - 5/5     Left quadriceps - 5/5    Right hamstrings - 5/5     Left hamstrings - 5/5    Right tibialis anterior - 5/5    Left tibialis anterior - 5/5    Right gastroc-soleus - 5/5    Left gastroc-soleus - 5/5    Right EHL - 5/5    Left EHL 5/5     Reflexes    Patient appears to have several beats of clonus in both lower extremities    Musculoskeletal   Lumbosacral Spine    Inspection and Palpation - no swelling   ROJM    Flexion - 70 degrees   Deformities/Malalignments/Discrepancies  - lumbar lordosis   Functional Testing    Straight Leg Raising Test negative     Hips   Right Hip - no tenderness to palpation, no pain    Left Hip - no tenderness to palpation, no pain           Imaging/Studies  X-rays of the cervical spine from 7/13/2017 are reviewed as well as a report.  Patient has degenerative changes that are fairly profound at C5 6 and C6 7.    Assessment:  1. Cervical myelopathy (CMS/HCC)    2. Sciatica of right side    3. Right leg pain        Plan:  1. Continue over-the-counter medication as needed for discomfort  2. Difficult to tell where the patient's right lower extremity weakness is coming from but it seems to me that he has myelopathy that has been worsening.  He very well may have a lumbar spine issue but given his upper extremity hyperreflexia and symptoms, I believe his cervical spine is the etiology.  We have contacted Dr. Stoddard who is been gracious enough to work the patient and this week and we have told the patient that he needs to absolutely see Dr. Stoddard.  He has an ex-fiancée who has seen Dr. Stoddard in the past and he thinks very highly of him.  We will hold off on ordering any tests      Medical Decision Making  Management Options : over-the-counter medicine  Data/Risk: radiology tests, discussion with another  health care provider and independent visualization of imaging, lab tests, or EMG/NCV    Alexander Shaw MD  09/11/18  6:11 PM

## 2018-09-13 ENCOUNTER — OFFICE VISIT (OUTPATIENT)
Dept: NEUROSURGERY | Facility: CLINIC | Age: 68
End: 2018-09-13

## 2018-09-13 VITALS — HEIGHT: 68 IN

## 2018-09-13 DIAGNOSIS — G95.9 CERVICAL MYELOPATHY (HCC): Primary | ICD-10-CM

## 2018-09-13 DIAGNOSIS — M79.2 PERIPHERAL NEUROPATHIC PAIN: ICD-10-CM

## 2018-09-13 PROCEDURE — 99203 OFFICE O/P NEW LOW 30 MIN: CPT | Performed by: NEUROLOGICAL SURGERY

## 2018-09-13 NOTE — PROGRESS NOTES
"  NAME: JEFFREY MARTINS JR   DOS: 2018  : 1950  PCP: Sebastian Lovett MD    Chief Complaint:  Difficulty with gait  Chief Complaint   Patient presents with   • R-leg weakness   • Medial side thigh numbness   • Left hand tremors       History of Present Illness:  68 y.o. male   This is a 68-year-old referral by orthopedic surgeon for a history of difficulty moving the right lower extremity.  The patient presents with a history of gait instability that is lasted over a year he's noted progressive loss of function in his right lower extremity as well as more recently difficulty with numbness in his hands and spasticity associated with his left upper extremity.  He's been to chiropractors in the past has a history of chronic neck pain he's here for evaluation interestingly he is  to a aneurysm patient of mine from the [facility]    PMHX  Allergies:  Allergies   Allergen Reactions   • Penicillamine Unknown (See Comments)   • Penicillins    • Sulfa Antibiotics      Medications    Current Outpatient Prescriptions:   •  albuterol (PROAIR HFA) 108 (90 BASE) MCG/ACT inhaler, Inhale 2 puffs Every 6 (Six) Hours As Needed for Wheezing., Disp: 1 inhaler, Rfl: 3  •  aspirin (ASPIRIN LOW DOSE) 81 MG EC tablet, Take 81 mg by mouth Daily., Disp: , Rfl:   •  B Complex Vitamins (B-COMPLEX/B-12 PO), Take  by mouth., Disp: , Rfl:   •  B-D 3CC LUER-JAIV SYR 38YO2-4/2 21G X 1-1/2\" 3 ML misc, USE ONE EVERY TWO WEEKS TO INJECT THE TESTOSTERONE., Disp: 10 each, Rfl: 0  •  B-D 3CC LUER-JAVI SYR 70QI4-4/4 2G X 1-1/4\" 3 ML misc, USE WITH TESTOSTERONE, Disp: 10 each, Rfl: 0  •  B-D 3CC LUER-JAVI SYR 90KY3-2/2 23G X 1-1/2\" 3 ML misc, USE WITH TESTOSTERONE, Disp: 100 each, Rfl: 0  •  BREO ELLIPTA 100-25 MCG/INH aerosol powder , INHALE 1 PUFF BY MOUTH ONCE DAILY, Disp: 60 each, Rfl: 0  •  doxazosin (CARDURA) 4 MG tablet, Take 2 tablets by mouth Every Morning., Disp: 60 tablet, Rfl: 11  •  finasteride (PROSCAR) 5 MG " tablet, TAKE 1 TABLET BY MOUTH DAILY, Disp: , Rfl: 3  •  Fluticasone Furoate-Vilanterol (BREO ELLIPTA) 100-25 MCG/INH aerosol powder , Inhale 1 puff Daily., Disp: 28 each, Rfl: 0  •  Multiple Vitamin (MULTI-VITAMIN DAILY PO), Take  by mouth., Disp: , Rfl:   •  sildenafil (VIAGRA) 100 MG tablet, Take 1 tablet by mouth Daily As Needed for erectile dysfunction., Disp: 5 tablet, Rfl: 5  •  Testosterone Cypionate (DEPOTESTOTERONE CYPIONATE) 200 MG/ML injection, Inject 1.5 mL into the shoulder, thigh, or buttocks Every 14 (Fourteen) Days., Disp: 10 mL, Rfl: 3  •  tiotropium (SPIRIVA HANDIHALER) 18 MCG per inhalation capsule, Place 1 capsule into inhaler and inhale Daily., Disp: 60 capsule, Rfl: 1  Past Medical History:  Past Medical History:   Diagnosis Date   • Benign prostatic hypertrophy    • COPD (chronic obstructive pulmonary disease) (CMS/HCC)    • Coughing up blood     History of   • Depression with anxiety    • Dermatitis    • Hearing loss    • Hiatal hernia    • History of acute pharyngitis    • History of histoplasmosis    • Hypertension    • Hypogonadism male    • Left inguinal hernia    • Mediastinal lymphadenopathy    • Sciatica of right side    • Tobacco abuse    • Type 2 diabetes mellitus (CMS/HCC)      Past Surgical History:  Past Surgical History:   Procedure Laterality Date   • COLONOSCOPY W/ POLYPECTOMY      Pathology consistent with hyperplastic polyp   • HERNIA REPAIR     • INGUINAL HERNIA REPAIR  2014   • KNEE ARTHROSCOPY W/ MENISCAL REPAIR      Medial and lateral meniscus repair   • NASAL POLYP EXCISION       Social Hx:  Social History   Substance Use Topics   • Smoking status: Current Every Day Smoker     Packs/day: 1.25     Years: 40.00     Types: Cigarettes   • Smokeless tobacco: Never Used   • Alcohol use 3.0 oz/week     5 Standard drinks or equivalent per week      Comment: 5 alcoholic beverages per week     Family Hx:  Family History   Problem Relation Age of Onset   • Dementia Mother    •  Prostate cancer Father    I have reviewed this note template and all pertinent parts of the review of systems social, family history, surgical history and medication list  Review of Systems:        Review of Systems         Physical Examination:  There were no vitals filed for this visit.   General Appearance:   Well developed, well nourished, well groomed, alert, and cooperative.  Neurological examination:  Neurologic Exam  Vital signs were reviewed and documented in the chart  Patient appeared in good neurologic function with normal comprehension fluent speech  Mood and affect are normal  Sense of smell deferred    Pupils symmetric equally reactive funduscopic exam not visualized   Visual fields intact to confrontation  Extraocular movements intact  Face motor function is symmetric  Facial sensations normal  Hearing intact to finger rub hearing intact to finger rub  Tongue is midline  Palate symmetric  Swallowing normal  Shoulder shrug normal    Muscle bulk and tone normal  5 out of 5 strength no motor drift he does have spasticity in the left upper extremity on extension he has reasonable strength he has 4-5 dorsiflexor on the right foot  Gait normal intact wide-based spastic  Negative Romberg nearly positive  No clonus long tract signs or myelopathy decreased vibratory sensation bilateral    Reflexes symmetric hyperreflexic throughout  No edema noted and extremities skin appears normal    Straight leg raise sign absent  No signs of intrinsic hip dysfunction  Back is without any lesions or abnormality  Feet are warm and well perfused        Review of Imaging/DATA:    Diagnoses/Plan:    Mr. Bladimir Jacobs is a 68 y.o. male   This gentleman presents with history of cervical spondylitic type of myelopathy.  He has a possibility of other symptoms any cranial nerve dysfunction etc. that would explain his symptoms.  I've explained the need for an MRI.  He needs an MRI of his cervical spine with and without contrast to  evaluate for cervical myelopathy.    I explained the importance of smoking cessation to the patient explaining that smoking decreases the efficacy of surgical therapies not to mention the general health risks.  In addition to this when contemplating fusion surgeries smoking decreases fusion rates and leads to poor outcome, and contributes to complication rate.    I will see him back this next week to evaluate accordingly

## 2018-09-16 DIAGNOSIS — G95.9 CERVICAL MYELOPATHY (HCC): Primary | ICD-10-CM

## 2018-09-17 ENCOUNTER — PREP FOR SURGERY (OUTPATIENT)
Dept: OTHER | Facility: HOSPITAL | Age: 68
End: 2018-09-17

## 2018-09-17 ENCOUNTER — OFFICE VISIT (OUTPATIENT)
Dept: NEUROSURGERY | Facility: CLINIC | Age: 68
End: 2018-09-17

## 2018-09-17 ENCOUNTER — HOSPITAL ENCOUNTER (OUTPATIENT)
Dept: MRI IMAGING | Facility: HOSPITAL | Age: 68
Discharge: HOME | End: 2018-09-17
Attending: NEUROLOGICAL SURGERY | Admitting: NEUROLOGICAL SURGERY

## 2018-09-17 VITALS — RESPIRATION RATE: 18 BRPM | BODY MASS INDEX: 29.7 KG/M2 | HEIGHT: 68 IN | WEIGHT: 196 LBS

## 2018-09-17 DIAGNOSIS — M79.2 PERIPHERAL NEUROPATHIC PAIN: ICD-10-CM

## 2018-09-17 DIAGNOSIS — M47.12 CERVICAL SPONDYLOSIS WITH MYELOPATHY: Primary | ICD-10-CM

## 2018-09-17 DIAGNOSIS — G95.9 CERVICAL MYELOPATHY (HCC): Primary | ICD-10-CM

## 2018-09-17 DIAGNOSIS — G95.9 CERVICAL MYELOPATHY (HCC): ICD-10-CM

## 2018-09-17 PROCEDURE — 72141 MRI NECK SPINE W/O DYE: CPT

## 2018-09-17 PROCEDURE — 99214 OFFICE O/P EST MOD 30 MIN: CPT | Performed by: NEUROLOGICAL SURGERY

## 2018-09-17 RX ORDER — IBUPROFEN 800 MG/1
800 TABLET ORAL ONCE
Status: CANCELLED | OUTPATIENT
Start: 2018-09-17 | End: 2018-09-17

## 2018-09-17 RX ORDER — SODIUM CHLORIDE 0.9 % (FLUSH) 0.9 %
1-10 SYRINGE (ML) INJECTION AS NEEDED
Status: CANCELLED | OUTPATIENT
Start: 2018-09-17

## 2018-09-17 RX ORDER — SODIUM CHLORIDE AND POTASSIUM CHLORIDE 150; 900 MG/100ML; MG/100ML
100 INJECTION, SOLUTION INTRAVENOUS CONTINUOUS
Status: CANCELLED | OUTPATIENT
Start: 2018-09-17

## 2018-09-17 RX ORDER — HYDROCODONE BITARTRATE AND ACETAMINOPHEN 7.5; 325 MG/1; MG/1
1 TABLET ORAL ONCE
Status: CANCELLED | OUTPATIENT
Start: 2018-09-17 | End: 2018-09-17

## 2018-09-17 RX ORDER — CEFAZOLIN SODIUM 2 G/100ML
2 INJECTION, SOLUTION INTRAVENOUS ONCE
Status: CANCELLED | OUTPATIENT
Start: 2018-09-17 | End: 2018-09-17

## 2018-09-17 RX ORDER — FAMOTIDINE 20 MG/1
20 TABLET, FILM COATED ORAL
Status: CANCELLED | OUTPATIENT
Start: 2018-09-17

## 2018-09-17 RX ORDER — CHLORHEXIDINE GLUCONATE 4 G/100ML
SOLUTION TOPICAL
Qty: 120 ML | Refills: 0 | Status: SHIPPED | OUTPATIENT
Start: 2018-09-17 | End: 2019-04-25

## 2018-09-17 RX ORDER — ACETAMINOPHEN 325 MG/1
650 TABLET ORAL ONCE
Status: CANCELLED | OUTPATIENT
Start: 2018-09-17 | End: 2018-09-17

## 2018-09-17 NOTE — PROGRESS NOTES
"  NAME: JEFFREY MARTINS JR.   DOS: 2018  : 1950  PCP: Sebastian Lovett MD    Chief Complaint:  Follow-up right sided foot weakness neck pain left-sided hand spasticity  Chief Complaint   Patient presents with   • Neck Pain       History of Present Illness:  68 y.o. male   He is here for follow-up he's had no decline in symptoms    This is a 68-year-old referral by orthopedic surgeon for a history of difficulty moving the right lower extremity.  The patient presents with a history of gait instability that is lasted over a year he's noted progressive loss of function in his right lower extremity as well as more recently difficulty with numbness in his hands and spasticity associated with his left upper extremity.  He's been to chiropractors in the past has a history of chronic neck pain he's here for evaluation interestingly he is  to a aneurysm patient of mine from the [facility]       PMHX  Allergies:  Allergies   Allergen Reactions   • Penicillamine Unknown (See Comments)   • Penicillins    • Sulfa Antibiotics      Medications    Current Outpatient Prescriptions:   •  albuterol (PROAIR HFA) 108 (90 BASE) MCG/ACT inhaler, Inhale 2 puffs Every 6 (Six) Hours As Needed for Wheezing., Disp: 1 inhaler, Rfl: 3  •  aspirin (ASPIRIN LOW DOSE) 81 MG EC tablet, Take 81 mg by mouth Daily., Disp: , Rfl:   •  B Complex Vitamins (B-COMPLEX/B-12 PO), Take  by mouth., Disp: , Rfl:   •  B-D 3CC LUER-JAVI SYR 48YI8-6/2 21G X 1-1/2\" 3 ML misc, USE ONE EVERY TWO WEEKS TO INJECT THE TESTOSTERONE., Disp: 10 each, Rfl: 0  •  B-D 3CC LUER-JAVI SYR 94FO4-4/4 2G X 1-1/4\" 3 ML misc, USE WITH TESTOSTERONE, Disp: 10 each, Rfl: 0  •  B-D 3CC LUER-JAVI SYR 90FZ1-1/2 23G X 1-1/2\" 3 ML misc, USE WITH TESTOSTERONE, Disp: 100 each, Rfl: 0  •  BREO ELLIPTA 100-25 MCG/INH aerosol powder , INHALE 1 PUFF BY MOUTH ONCE DAILY, Disp: 60 each, Rfl: 0  •  doxazosin (CARDURA) 4 MG tablet, Take 2 tablets by mouth Every Morning., " Disp: 60 tablet, Rfl: 11  •  finasteride (PROSCAR) 5 MG tablet, TAKE 1 TABLET BY MOUTH DAILY, Disp: , Rfl: 3  •  Fluticasone Furoate-Vilanterol (BREO ELLIPTA) 100-25 MCG/INH aerosol powder , Inhale 1 puff Daily., Disp: 28 each, Rfl: 0  •  Multiple Vitamin (MULTI-VITAMIN DAILY PO), Take  by mouth., Disp: , Rfl:   •  sildenafil (VIAGRA) 100 MG tablet, Take 1 tablet by mouth Daily As Needed for erectile dysfunction., Disp: 5 tablet, Rfl: 5  •  Testosterone Cypionate (DEPOTESTOTERONE CYPIONATE) 200 MG/ML injection, Inject 1.5 mL into the shoulder, thigh, or buttocks Every 14 (Fourteen) Days., Disp: 10 mL, Rfl: 3  •  tiotropium (SPIRIVA HANDIHALER) 18 MCG per inhalation capsule, Place 1 capsule into inhaler and inhale Daily., Disp: 60 capsule, Rfl: 1  Past Medical History:  Past Medical History:   Diagnosis Date   • Benign prostatic hypertrophy    • COPD (chronic obstructive pulmonary disease) (CMS/HCC)    • Coughing up blood     History of   • Depression with anxiety    • Dermatitis    • Hearing loss    • Hiatal hernia    • History of acute pharyngitis    • History of histoplasmosis    • Hypertension    • Hypogonadism male    • Left inguinal hernia    • Mediastinal lymphadenopathy    • Sciatica of right side    • Tobacco abuse    • Type 2 diabetes mellitus (CMS/HCC)      Past Surgical History:  Past Surgical History:   Procedure Laterality Date   • COLONOSCOPY W/ POLYPECTOMY      Pathology consistent with hyperplastic polyp   • HERNIA REPAIR     • INGUINAL HERNIA REPAIR  2014   • KNEE ARTHROSCOPY W/ MENISCAL REPAIR      Medial and lateral meniscus repair   • NASAL POLYP EXCISION       Social Hx:  Social History   Substance Use Topics   • Smoking status: Current Every Day Smoker     Packs/day: 1.25     Years: 40.00     Types: Cigarettes   • Smokeless tobacco: Never Used   • Alcohol use 3.0 oz/week     5 Standard drinks or equivalent per week      Comment: 5 alcoholic beverages per week     Family Hx:  Family History    Problem Relation Age of Onset   • Dementia Mother    • Prostate cancer Father      Review of Systems:        Review of Systems   Constitutional: Negative for activity change, appetite change, chills, diaphoresis, fatigue, fever and unexpected weight change.   HENT: Negative for congestion, dental problem, drooling, ear discharge, ear pain, facial swelling, hearing loss, mouth sores, nosebleeds, postnasal drip, rhinorrhea, sinus pressure, sneezing, sore throat, tinnitus, trouble swallowing and voice change.    Eyes: Negative for photophobia, pain, discharge, redness, itching and visual disturbance.   Respiratory: Negative for apnea, cough, choking, chest tightness, shortness of breath, wheezing and stridor.    Cardiovascular: Negative for chest pain, palpitations and leg swelling.   Gastrointestinal: Negative for abdominal distention, abdominal pain, anal bleeding, blood in stool, constipation, diarrhea, nausea, rectal pain and vomiting.   Endocrine: Negative for cold intolerance, heat intolerance, polydipsia, polyphagia and polyuria.   Genitourinary: Negative for decreased urine volume, difficulty urinating, dysuria, enuresis, flank pain, frequency, genital sores, hematuria and urgency.   Musculoskeletal: Positive for neck pain. Negative for arthralgias, back pain, gait problem, joint swelling, myalgias and neck stiffness.   Skin: Negative for color change, pallor, rash and wound.   Allergic/Immunologic: Negative for environmental allergies, food allergies and immunocompromised state.   Neurological: Negative for dizziness, tremors, seizures, syncope, facial asymmetry, speech difficulty, weakness, light-headedness, numbness and headaches.   Hematological: Negative for adenopathy. Does not bruise/bleed easily.   Psychiatric/Behavioral: Negative for agitation, behavioral problems, confusion, decreased concentration, dysphoric mood, hallucinations, self-injury, sleep disturbance and suicidal ideas. The patient is not  nervous/anxious and is not hyperactive.    All other systems reviewed and are negative.     I have reviewed this note template and all pertinent parts of the review of systems social, family history, surgical history and medication list      Physical Examination:  Vitals:    09/17/18 1256   Resp: 18      General Appearance:   Well developed, well nourished, well groomed, alert, and cooperative.  Neurological examination:  Neurologic Exam  His exam appears stable his gait is wide-based and stable he still has spasticity and his left upper extremity    Review of Imaging/DATA:  His MRI was reviewed he has clear cord compression at the C5 level with cord edema and severe flattening loss of cervical lordosis  Diagnoses/Plan:    Mr. Garrison is a 68 y.o. male   He presents with some pannus formation at the C5 6 levels he has loss of cervical lordosis multilevel intraforaminal disease of significance with cervical myelopathy I explained to him the pathophysiology of this.  He is a smoker.  From my standpoint I think at the minimum he's get a require a 2 level anterior cervical discectomy and fusion at C5 6 and C6 7 these are probably get a be closer to corpectomies given his findings.  I explained to him that he may get a cervical corpectomy at C5 and C6 and/or a C5 corpectomy with partial C5 6 cervical fusion.  I would like a CT scan of his neck to evaluate his bony anatomy and make sure he doesn't have a OPLL.  I explained that this may be part of a staged procedure.  I explained the importance of smoking cessation to the patient explaining that smoking decreases the efficacy of surgical therapies not to mention the general health risks.  In addition to this when contemplating fusion surgeries smoking decreases fusion rates and leads to poor outcome, and contributes to complication rate.

## 2018-09-18 ENCOUNTER — HOSPITAL ENCOUNTER (OUTPATIENT)
Dept: CT IMAGING | Facility: HOSPITAL | Age: 68
Discharge: HOME | End: 2018-09-18
Attending: NEUROLOGICAL SURGERY | Admitting: NEUROLOGICAL SURGERY

## 2018-09-18 PROBLEM — M47.12 CERVICAL SPONDYLOSIS WITH MYELOPATHY: Status: ACTIVE | Noted: 2018-09-18

## 2018-09-18 PROCEDURE — 72125 CT NECK SPINE W/O DYE: CPT

## 2018-10-04 DIAGNOSIS — N40.1 BENIGN NON-NODULAR PROSTATIC HYPERPLASIA WITH LOWER URINARY TRACT SYMPTOMS: ICD-10-CM

## 2018-10-04 RX ORDER — DOXAZOSIN MESYLATE 4 MG/1
8 TABLET ORAL EVERY MORNING
Qty: 60 TABLET | Refills: 5 | Status: SHIPPED | OUTPATIENT
Start: 2018-10-04 | End: 2019-01-07 | Stop reason: SDUPTHER

## 2018-10-18 ENCOUNTER — APPOINTMENT (OUTPATIENT)
Dept: PREADMISSION TESTING | Facility: HOSPITAL | Age: 68
End: 2018-10-18

## 2018-10-18 VITALS — HEIGHT: 68 IN | WEIGHT: 195.11 LBS | BODY MASS INDEX: 29.57 KG/M2

## 2018-10-18 DIAGNOSIS — M47.12 CERVICAL SPONDYLOSIS WITH MYELOPATHY: ICD-10-CM

## 2018-10-18 LAB
ANION GAP SERPL CALCULATED.3IONS-SCNC: 3 MMOL/L (ref 3–11)
BUN BLD-MCNC: 15 MG/DL (ref 9–23)
BUN/CREAT SERPL: 17.9 (ref 7–25)
CALCIUM SPEC-SCNC: 8.9 MG/DL (ref 8.7–10.4)
CHLORIDE SERPL-SCNC: 107 MMOL/L (ref 99–109)
CO2 SERPL-SCNC: 27 MMOL/L (ref 20–31)
CREAT BLD-MCNC: 0.84 MG/DL (ref 0.6–1.3)
DEPRECATED RDW RBC AUTO: 49 FL (ref 37–54)
ERYTHROCYTE [DISTWIDTH] IN BLOOD BY AUTOMATED COUNT: 13.9 % (ref 11.3–14.5)
GFR SERPL CREATININE-BSD FRML MDRD: 91 ML/MIN/1.73
GLUCOSE BLD-MCNC: 109 MG/DL (ref 70–100)
HBA1C MFR BLD: 6.4 % (ref 4.8–5.6)
HCT VFR BLD AUTO: 50.6 % (ref 38.9–50.9)
HGB BLD-MCNC: 18.3 G/DL (ref 13.1–17.5)
MCH RBC QN AUTO: 34.5 PG (ref 27–31)
MCHC RBC AUTO-ENTMCNC: 36.2 G/DL (ref 32–36)
MCV RBC AUTO: 95.5 FL (ref 80–99)
MRSA DNA SPEC QL NAA+PROBE: NEGATIVE
PLATELET # BLD AUTO: 131 10*3/MM3 (ref 150–450)
PMV BLD AUTO: 10.7 FL (ref 6–12)
POTASSIUM BLD-SCNC: 3.8 MMOL/L (ref 3.5–5.5)
RBC # BLD AUTO: 5.3 10*6/MM3 (ref 4.2–5.76)
SODIUM BLD-SCNC: 137 MMOL/L (ref 132–146)
WBC NRBC COR # BLD: 8.2 10*3/MM3 (ref 3.5–10.8)

## 2018-10-18 PROCEDURE — 83036 HEMOGLOBIN GLYCOSYLATED A1C: CPT | Performed by: ANESTHESIOLOGY

## 2018-10-18 PROCEDURE — 80048 BASIC METABOLIC PNL TOTAL CA: CPT | Performed by: NEUROLOGICAL SURGERY

## 2018-10-18 PROCEDURE — 93010 ELECTROCARDIOGRAM REPORT: CPT | Performed by: INTERNAL MEDICINE

## 2018-10-18 PROCEDURE — 87641 MR-STAPH DNA AMP PROBE: CPT | Performed by: ANESTHESIOLOGY

## 2018-10-18 PROCEDURE — 36415 COLL VENOUS BLD VENIPUNCTURE: CPT

## 2018-10-18 PROCEDURE — 85027 COMPLETE CBC AUTOMATED: CPT | Performed by: ANESTHESIOLOGY

## 2018-10-18 PROCEDURE — 93005 ELECTROCARDIOGRAM TRACING: CPT

## 2018-10-18 NOTE — DISCHARGE INSTRUCTIONS

## 2018-10-19 ENCOUNTER — APPOINTMENT (OUTPATIENT)
Dept: GENERAL RADIOLOGY | Facility: HOSPITAL | Age: 68
End: 2018-10-19

## 2018-10-19 ENCOUNTER — ANESTHESIA EVENT (OUTPATIENT)
Dept: PERIOP | Facility: HOSPITAL | Age: 68
End: 2018-10-19

## 2018-10-19 ENCOUNTER — ANESTHESIA (OUTPATIENT)
Dept: PERIOP | Facility: HOSPITAL | Age: 68
End: 2018-10-19

## 2018-10-19 ENCOUNTER — HOSPITAL ENCOUNTER (INPATIENT)
Facility: HOSPITAL | Age: 68
LOS: 1 days | Discharge: HOME | End: 2018-10-20
Attending: NEUROLOGICAL SURGERY | Admitting: NEUROLOGICAL SURGERY

## 2018-10-19 DIAGNOSIS — G95.9 CERVICAL MYELOPATHY (HCC): ICD-10-CM

## 2018-10-19 DIAGNOSIS — M47.12 CERVICAL SPONDYLOSIS WITH MYELOPATHY: ICD-10-CM

## 2018-10-19 PROBLEM — M54.12 CERVICAL MYELOPATHY WITH CERVICAL RADICULOPATHY: Status: ACTIVE | Noted: 2018-10-19

## 2018-10-19 LAB
GLUCOSE BLDC GLUCOMTR-MCNC: 110 MG/DL (ref 70–130)
GLUCOSE BLDC GLUCOMTR-MCNC: 127 MG/DL (ref 70–130)
GLUCOSE BLDC GLUCOMTR-MCNC: 128 MG/DL (ref 70–130)

## 2018-10-19 PROCEDURE — 25010000002 PHENYLEPHRINE PER 1 ML: Performed by: NURSE ANESTHETIST, CERTIFIED REGISTERED

## 2018-10-19 PROCEDURE — C1713 ANCHOR/SCREW BN/BN,TIS/BN: HCPCS | Performed by: NEUROLOGICAL SURGERY

## 2018-10-19 PROCEDURE — 25010000002 PROPOFOL 10 MG/ML EMULSION: Performed by: NURSE ANESTHETIST, CERTIFIED REGISTERED

## 2018-10-19 PROCEDURE — 25010000002 FENTANYL CITRATE (PF) 100 MCG/2ML SOLUTION: Performed by: NURSE ANESTHETIST, CERTIFIED REGISTERED

## 2018-10-19 PROCEDURE — 25010000002 HYDROMORPHONE PER 4 MG: Performed by: NURSE ANESTHETIST, CERTIFIED REGISTERED

## 2018-10-19 PROCEDURE — 0RG20K0 FUSION OF 2 OR MORE CERVICAL VERTEBRAL JOINTS WITH NONAUTOLOGOUS TISSUE SUBSTITUTE, ANTERIOR APPROACH, ANTERIOR COLUMN, OPEN APPROACH: ICD-10-PCS | Performed by: NEUROLOGICAL SURGERY

## 2018-10-19 PROCEDURE — 20931 SP BONE ALGRFT STRUCT ADD-ON: CPT | Performed by: NEUROLOGICAL SURGERY

## 2018-10-19 PROCEDURE — 25010000002 PROPOFOL 1000 MG/ML EMULSION: Performed by: NURSE ANESTHETIST, CERTIFIED REGISTERED

## 2018-10-19 PROCEDURE — 22551 ARTHRD ANT NTRBDY CERVICAL: CPT | Performed by: NEUROLOGICAL SURGERY

## 2018-10-19 PROCEDURE — 22845 INSERT SPINE FIXATION DEVICE: CPT | Performed by: PHYSICIAN ASSISTANT

## 2018-10-19 PROCEDURE — 82962 GLUCOSE BLOOD TEST: CPT

## 2018-10-19 PROCEDURE — 22551 ARTHRD ANT NTRBDY CERVICAL: CPT | Performed by: PHYSICIAN ASSISTANT

## 2018-10-19 PROCEDURE — 76001 HC FLUORO GREATER THAN 1 HOUR: CPT

## 2018-10-19 PROCEDURE — 22845 INSERT SPINE FIXATION DEVICE: CPT | Performed by: NEUROLOGICAL SURGERY

## 2018-10-19 PROCEDURE — 25010000002 DEXAMETHASONE PER 1 MG: Performed by: NURSE ANESTHETIST, CERTIFIED REGISTERED

## 2018-10-19 PROCEDURE — 25010000002 VANCOMYCIN 10 G RECONSTITUTED SOLUTION: Performed by: NEUROLOGICAL SURGERY

## 2018-10-19 PROCEDURE — 22552 ARTHRD ANT NTRBD CERVICAL EA: CPT | Performed by: PHYSICIAN ASSISTANT

## 2018-10-19 PROCEDURE — 25010000002 NEOSTIGMINE 10 MG/10ML SOLUTION: Performed by: NURSE ANESTHETIST, CERTIFIED REGISTERED

## 2018-10-19 PROCEDURE — 22552 ARTHRD ANT NTRBD CERVICAL EA: CPT | Performed by: NEUROLOGICAL SURGERY

## 2018-10-19 PROCEDURE — 0RB30ZZ EXCISION OF CERVICAL VERTEBRAL DISC, OPEN APPROACH: ICD-10-PCS | Performed by: NEUROLOGICAL SURGERY

## 2018-10-19 PROCEDURE — 25010000002 VANCOMYCIN: Performed by: NEUROLOGICAL SURGERY

## 2018-10-19 PROCEDURE — 76000 FLUOROSCOPY <1 HR PHYS/QHP: CPT

## 2018-10-19 PROCEDURE — 25010000002 ONDANSETRON PER 1 MG: Performed by: NURSE ANESTHETIST, CERTIFIED REGISTERED

## 2018-10-19 DEVICE — ALLOGRFT BONE BICORT MATRIGRAFT CLOWARD/DOWEL FZD 13MM: Type: IMPLANTABLE DEVICE | Site: SPINE CERVICAL | Status: FUNCTIONAL

## 2018-10-19 DEVICE — ALLOGRFT SPINE ACDF VERTIGRAFT WEDGE FZ 7DEG 7.22X5.75MM: Type: IMPLANTABLE DEVICE | Site: SPINE CERVICAL | Status: FUNCTIONAL

## 2018-10-19 DEVICE — WAX BONE HEMO AESCULAP 2.5GM: Type: IMPLANTABLE DEVICE | Site: SPINE CERVICAL | Status: FUNCTIONAL

## 2018-10-19 DEVICE — SCRW SKYLINE VAR SD 16MM: Type: IMPLANTABLE DEVICE | Site: SPINE CERVICAL | Status: FUNCTIONAL

## 2018-10-19 DEVICE — PLT SKYLINE 2LVL 30MM: Type: IMPLANTABLE DEVICE | Site: SPINE CERVICAL | Status: FUNCTIONAL

## 2018-10-19 RX ORDER — BUDESONIDE AND FORMOTEROL FUMARATE DIHYDRATE 80; 4.5 UG/1; UG/1
2 AEROSOL RESPIRATORY (INHALATION)
Status: DISCONTINUED | OUTPATIENT
Start: 2018-10-19 | End: 2018-10-20 | Stop reason: HOSPADM

## 2018-10-19 RX ORDER — DOCUSATE SODIUM 100 MG/1
100 CAPSULE, LIQUID FILLED ORAL 2 TIMES DAILY PRN
Status: DISCONTINUED | OUTPATIENT
Start: 2018-10-19 | End: 2018-10-20 | Stop reason: HOSPADM

## 2018-10-19 RX ORDER — IBUPROFEN 800 MG/1
800 TABLET ORAL ONCE
Status: COMPLETED | OUTPATIENT
Start: 2018-10-19 | End: 2018-10-19

## 2018-10-19 RX ORDER — CEFAZOLIN SODIUM 2 G/100ML
2 INJECTION, SOLUTION INTRAVENOUS ONCE
Status: DISCONTINUED | OUTPATIENT
Start: 2018-10-19 | End: 2018-10-19 | Stop reason: HOSPADM

## 2018-10-19 RX ORDER — ONDANSETRON 2 MG/ML
4 INJECTION INTRAMUSCULAR; INTRAVENOUS EVERY 6 HOURS PRN
Status: DISCONTINUED | OUTPATIENT
Start: 2018-10-19 | End: 2018-10-20 | Stop reason: HOSPADM

## 2018-10-19 RX ORDER — SODIUM CHLORIDE AND POTASSIUM CHLORIDE 150; 900 MG/100ML; MG/100ML
100 INJECTION, SOLUTION INTRAVENOUS CONTINUOUS
Status: DISCONTINUED | OUTPATIENT
Start: 2018-10-19 | End: 2018-10-19

## 2018-10-19 RX ORDER — LIDOCAINE HYDROCHLORIDE AND EPINEPHRINE 5; 5 MG/ML; UG/ML
INJECTION, SOLUTION INFILTRATION; PERINEURAL AS NEEDED
Status: DISCONTINUED | OUTPATIENT
Start: 2018-10-19 | End: 2018-10-19 | Stop reason: HOSPADM

## 2018-10-19 RX ORDER — FENTANYL CITRATE 50 UG/ML
50 INJECTION, SOLUTION INTRAMUSCULAR; INTRAVENOUS
Status: DISCONTINUED | OUTPATIENT
Start: 2018-10-19 | End: 2018-10-19 | Stop reason: HOSPADM

## 2018-10-19 RX ORDER — HYDROCODONE BITARTRATE AND ACETAMINOPHEN 7.5; 325 MG/1; MG/1
2 TABLET ORAL EVERY 4 HOURS PRN
Status: DISCONTINUED | OUTPATIENT
Start: 2018-10-19 | End: 2018-10-20 | Stop reason: HOSPADM

## 2018-10-19 RX ORDER — HYDROCODONE BITARTRATE AND ACETAMINOPHEN 7.5; 325 MG/1; MG/1
1 TABLET ORAL ONCE
Status: COMPLETED | OUTPATIENT
Start: 2018-10-19 | End: 2018-10-19

## 2018-10-19 RX ORDER — CEFAZOLIN SODIUM 2 G/100ML
2 INJECTION, SOLUTION INTRAVENOUS EVERY 8 HOURS
Status: DISCONTINUED | OUTPATIENT
Start: 2018-10-19 | End: 2018-10-19 | Stop reason: ALTCHOICE

## 2018-10-19 RX ORDER — SODIUM CHLORIDE 0.9 % (FLUSH) 0.9 %
3 SYRINGE (ML) INJECTION EVERY 12 HOURS SCHEDULED
Status: DISCONTINUED | OUTPATIENT
Start: 2018-10-19 | End: 2018-10-19 | Stop reason: HOSPADM

## 2018-10-19 RX ORDER — FAMOTIDINE 10 MG/ML
20 INJECTION, SOLUTION INTRAVENOUS ONCE
Status: CANCELLED | OUTPATIENT
Start: 2018-10-19 | End: 2018-10-19

## 2018-10-19 RX ORDER — ONDANSETRON 2 MG/ML
INJECTION INTRAMUSCULAR; INTRAVENOUS AS NEEDED
Status: DISCONTINUED | OUTPATIENT
Start: 2018-10-19 | End: 2018-10-19 | Stop reason: SURG

## 2018-10-19 RX ORDER — NALOXONE HCL 0.4 MG/ML
0.4 VIAL (ML) INJECTION
Status: DISCONTINUED | OUTPATIENT
Start: 2018-10-19 | End: 2018-10-20 | Stop reason: HOSPADM

## 2018-10-19 RX ORDER — ACETAMINOPHEN 325 MG/1
650 TABLET ORAL ONCE
Status: COMPLETED | OUTPATIENT
Start: 2018-10-19 | End: 2018-10-19

## 2018-10-19 RX ORDER — TERAZOSIN 5 MG/1
5 CAPSULE ORAL DAILY
Status: DISCONTINUED | OUTPATIENT
Start: 2018-10-20 | End: 2018-10-20 | Stop reason: HOSPADM

## 2018-10-19 RX ORDER — METHOCARBAMOL 750 MG/1
750 TABLET, FILM COATED ORAL EVERY 8 HOURS SCHEDULED
Status: DISCONTINUED | OUTPATIENT
Start: 2018-10-19 | End: 2018-10-20 | Stop reason: HOSPADM

## 2018-10-19 RX ORDER — SODIUM CHLORIDE, SODIUM LACTATE, POTASSIUM CHLORIDE, CALCIUM CHLORIDE 600; 310; 30; 20 MG/100ML; MG/100ML; MG/100ML; MG/100ML
75 INJECTION, SOLUTION INTRAVENOUS CONTINUOUS
Status: DISCONTINUED | OUTPATIENT
Start: 2018-10-19 | End: 2018-10-20 | Stop reason: HOSPADM

## 2018-10-19 RX ORDER — BISACODYL 5 MG/1
5 TABLET, DELAYED RELEASE ORAL DAILY PRN
Status: DISCONTINUED | OUTPATIENT
Start: 2018-10-19 | End: 2018-10-20 | Stop reason: HOSPADM

## 2018-10-19 RX ORDER — ONDANSETRON 2 MG/ML
4 INJECTION INTRAMUSCULAR; INTRAVENOUS ONCE AS NEEDED
Status: DISCONTINUED | OUTPATIENT
Start: 2018-10-19 | End: 2018-10-19 | Stop reason: HOSPADM

## 2018-10-19 RX ORDER — PROMETHAZINE HYDROCHLORIDE 25 MG/ML
6.25 INJECTION, SOLUTION INTRAMUSCULAR; INTRAVENOUS ONCE AS NEEDED
Status: DISCONTINUED | OUTPATIENT
Start: 2018-10-19 | End: 2018-10-19 | Stop reason: HOSPADM

## 2018-10-19 RX ORDER — SODIUM CHLORIDE 0.9 % (FLUSH) 0.9 %
3-10 SYRINGE (ML) INJECTION AS NEEDED
Status: DISCONTINUED | OUTPATIENT
Start: 2018-10-19 | End: 2018-10-19 | Stop reason: HOSPADM

## 2018-10-19 RX ORDER — LIDOCAINE HYDROCHLORIDE 10 MG/ML
0.5 INJECTION, SOLUTION EPIDURAL; INFILTRATION; INTRACAUDAL; PERINEURAL ONCE AS NEEDED
Status: DISCONTINUED | OUTPATIENT
Start: 2018-10-19 | End: 2018-10-19

## 2018-10-19 RX ORDER — PROPOFOL 10 MG/ML
VIAL (ML) INTRAVENOUS AS NEEDED
Status: DISCONTINUED | OUTPATIENT
Start: 2018-10-19 | End: 2018-10-19 | Stop reason: SURG

## 2018-10-19 RX ORDER — DEXAMETHASONE SODIUM PHOSPHATE 4 MG/ML
INJECTION, SOLUTION INTRA-ARTICULAR; INTRALESIONAL; INTRAMUSCULAR; INTRAVENOUS; SOFT TISSUE AS NEEDED
Status: DISCONTINUED | OUTPATIENT
Start: 2018-10-19 | End: 2018-10-19 | Stop reason: SURG

## 2018-10-19 RX ORDER — SODIUM CHLORIDE, SODIUM LACTATE, POTASSIUM CHLORIDE, CALCIUM CHLORIDE 600; 310; 30; 20 MG/100ML; MG/100ML; MG/100ML; MG/100ML
9 INJECTION, SOLUTION INTRAVENOUS CONTINUOUS
Status: DISCONTINUED | OUTPATIENT
Start: 2018-10-19 | End: 2018-10-19

## 2018-10-19 RX ORDER — MINERAL OIL
OIL (ML) MISCELLANEOUS AS NEEDED
Status: DISCONTINUED | OUTPATIENT
Start: 2018-10-19 | End: 2018-10-19 | Stop reason: HOSPADM

## 2018-10-19 RX ORDER — GLYCOPYRROLATE 0.2 MG/ML
INJECTION INTRAMUSCULAR; INTRAVENOUS AS NEEDED
Status: DISCONTINUED | OUTPATIENT
Start: 2018-10-19 | End: 2018-10-19 | Stop reason: SURG

## 2018-10-19 RX ORDER — BISACODYL 10 MG
10 SUPPOSITORY, RECTAL RECTAL DAILY PRN
Status: DISCONTINUED | OUTPATIENT
Start: 2018-10-19 | End: 2018-10-20 | Stop reason: HOSPADM

## 2018-10-19 RX ORDER — ROCURONIUM BROMIDE 10 MG/ML
INJECTION, SOLUTION INTRAVENOUS AS NEEDED
Status: DISCONTINUED | OUTPATIENT
Start: 2018-10-19 | End: 2018-10-19 | Stop reason: SURG

## 2018-10-19 RX ORDER — HYDROMORPHONE HYDROCHLORIDE 1 MG/ML
0.5 INJECTION, SOLUTION INTRAMUSCULAR; INTRAVENOUS; SUBCUTANEOUS
Status: DISCONTINUED | OUTPATIENT
Start: 2018-10-19 | End: 2018-10-19 | Stop reason: HOSPADM

## 2018-10-19 RX ORDER — PROMETHAZINE HYDROCHLORIDE 25 MG/1
25 SUPPOSITORY RECTAL ONCE AS NEEDED
Status: DISCONTINUED | OUTPATIENT
Start: 2018-10-19 | End: 2018-10-19 | Stop reason: HOSPADM

## 2018-10-19 RX ORDER — SODIUM CHLORIDE 0.9 % (FLUSH) 0.9 %
1-10 SYRINGE (ML) INJECTION AS NEEDED
Status: DISCONTINUED | OUTPATIENT
Start: 2018-10-19 | End: 2018-10-19 | Stop reason: HOSPADM

## 2018-10-19 RX ORDER — PROMETHAZINE HYDROCHLORIDE 25 MG/1
25 TABLET ORAL ONCE AS NEEDED
Status: DISCONTINUED | OUTPATIENT
Start: 2018-10-19 | End: 2018-10-19 | Stop reason: HOSPADM

## 2018-10-19 RX ORDER — MAGNESIUM HYDROXIDE 1200 MG/15ML
LIQUID ORAL AS NEEDED
Status: DISCONTINUED | OUTPATIENT
Start: 2018-10-19 | End: 2018-10-19 | Stop reason: HOSPADM

## 2018-10-19 RX ORDER — SENNA AND DOCUSATE SODIUM 50; 8.6 MG/1; MG/1
1 TABLET, FILM COATED ORAL NIGHTLY PRN
Status: DISCONTINUED | OUTPATIENT
Start: 2018-10-19 | End: 2018-10-20 | Stop reason: HOSPADM

## 2018-10-19 RX ORDER — SODIUM CHLORIDE 0.9 % (FLUSH) 0.9 %
3 SYRINGE (ML) INJECTION EVERY 12 HOURS SCHEDULED
Status: DISCONTINUED | OUTPATIENT
Start: 2018-10-19 | End: 2018-10-20 | Stop reason: HOSPADM

## 2018-10-19 RX ORDER — NEOSTIGMINE METHYLSULFATE 1 MG/ML
INJECTION, SOLUTION INTRAVENOUS AS NEEDED
Status: DISCONTINUED | OUTPATIENT
Start: 2018-10-19 | End: 2018-10-19 | Stop reason: SURG

## 2018-10-19 RX ORDER — ALBUTEROL SULFATE 2.5 MG/3ML
2.5 SOLUTION RESPIRATORY (INHALATION) EVERY 6 HOURS PRN
Status: DISCONTINUED | OUTPATIENT
Start: 2018-10-19 | End: 2018-10-20 | Stop reason: HOSPADM

## 2018-10-19 RX ORDER — LIDOCAINE HYDROCHLORIDE 10 MG/ML
INJECTION, SOLUTION EPIDURAL; INFILTRATION; INTRACAUDAL; PERINEURAL AS NEEDED
Status: DISCONTINUED | OUTPATIENT
Start: 2018-10-19 | End: 2018-10-19 | Stop reason: SURG

## 2018-10-19 RX ORDER — FAMOTIDINE 20 MG/1
20 TABLET, FILM COATED ORAL ONCE
Status: DISCONTINUED | OUTPATIENT
Start: 2018-10-19 | End: 2018-10-19

## 2018-10-19 RX ORDER — FAMOTIDINE 20 MG/1
20 TABLET, FILM COATED ORAL
Status: COMPLETED | OUTPATIENT
Start: 2018-10-19 | End: 2018-10-19

## 2018-10-19 RX ORDER — FENTANYL CITRATE 50 UG/ML
INJECTION, SOLUTION INTRAMUSCULAR; INTRAVENOUS AS NEEDED
Status: DISCONTINUED | OUTPATIENT
Start: 2018-10-19 | End: 2018-10-19 | Stop reason: SURG

## 2018-10-19 RX ORDER — SODIUM CHLORIDE 0.9 % (FLUSH) 0.9 %
1-10 SYRINGE (ML) INJECTION AS NEEDED
Status: DISCONTINUED | OUTPATIENT
Start: 2018-10-19 | End: 2018-10-20 | Stop reason: HOSPADM

## 2018-10-19 RX ORDER — ACETAMINOPHEN 325 MG/1
650 TABLET ORAL EVERY 4 HOURS PRN
Status: DISCONTINUED | OUTPATIENT
Start: 2018-10-19 | End: 2018-10-20 | Stop reason: HOSPADM

## 2018-10-19 RX ADMIN — PROPOFOL 80 MCG/KG/MIN: 10 INJECTION, EMULSION INTRAVENOUS at 07:55

## 2018-10-19 RX ADMIN — ROCURONIUM BROMIDE 10 MG: 10 SOLUTION INTRAVENOUS at 09:27

## 2018-10-19 RX ADMIN — GLYCOPYRROLATE 0.4 MG: 0.2 INJECTION, SOLUTION INTRAMUSCULAR; INTRAVENOUS at 10:20

## 2018-10-19 RX ADMIN — PHENYLEPHRINE HYDROCHLORIDE 0.5 MCG/KG/MIN: 10 INJECTION INTRAVENOUS at 08:05

## 2018-10-19 RX ADMIN — NEOSTIGMINE METHYLSULFATE 2.5 MG: 1 INJECTION, SOLUTION INTRAVENOUS at 10:20

## 2018-10-19 RX ADMIN — FENTANYL CITRATE 25 MCG: 50 INJECTION, SOLUTION INTRAMUSCULAR; INTRAVENOUS at 09:30

## 2018-10-19 RX ADMIN — PROPOFOL 150 MG: 10 INJECTION, EMULSION INTRAVENOUS at 07:45

## 2018-10-19 RX ADMIN — IBUPROFEN 800 MG: 800 TABLET ORAL at 06:30

## 2018-10-19 RX ADMIN — SODIUM CHLORIDE, POTASSIUM CHLORIDE, SODIUM LACTATE AND CALCIUM CHLORIDE 75 ML/HR: 600; 310; 30; 20 INJECTION, SOLUTION INTRAVENOUS at 22:28

## 2018-10-19 RX ADMIN — HYDROCODONE BITARTRATE AND ACETAMINOPHEN 1 TABLET: 7.5; 325 TABLET ORAL at 06:30

## 2018-10-19 RX ADMIN — SODIUM CHLORIDE, POTASSIUM CHLORIDE, SODIUM LACTATE AND CALCIUM CHLORIDE: 600; 310; 30; 20 INJECTION, SOLUTION INTRAVENOUS at 10:20

## 2018-10-19 RX ADMIN — PHENYLEPHRINE HYDROCHLORIDE 40 MCG: 10 INJECTION INTRAVENOUS at 07:57

## 2018-10-19 RX ADMIN — HYDROMORPHONE HYDROCHLORIDE 0.5 MG: 1 INJECTION, SOLUTION INTRAMUSCULAR; INTRAVENOUS; SUBCUTANEOUS at 10:51

## 2018-10-19 RX ADMIN — LIDOCAINE HYDROCHLORIDE 50 MG: 10 INJECTION, SOLUTION EPIDURAL; INFILTRATION; INTRACAUDAL; PERINEURAL at 07:45

## 2018-10-19 RX ADMIN — METHOCARBAMOL 750 MG: 750 TABLET ORAL at 14:43

## 2018-10-19 RX ADMIN — SODIUM CHLORIDE, POTASSIUM CHLORIDE, SODIUM LACTATE AND CALCIUM CHLORIDE 75 ML/HR: 600; 310; 30; 20 INJECTION, SOLUTION INTRAVENOUS at 14:47

## 2018-10-19 RX ADMIN — GLYCOPYRROLATE 0.2 MG: 0.2 INJECTION, SOLUTION INTRAMUSCULAR; INTRAVENOUS at 08:56

## 2018-10-19 RX ADMIN — VANCOMYCIN HYDROCHLORIDE 1250 MG: 10 INJECTION, POWDER, LYOPHILIZED, FOR SOLUTION INTRAVENOUS at 22:08

## 2018-10-19 RX ADMIN — ROCURONIUM BROMIDE 10 MG: 10 SOLUTION INTRAVENOUS at 08:56

## 2018-10-19 RX ADMIN — ACETAMINOPHEN 650 MG: 325 TABLET ORAL at 06:29

## 2018-10-19 RX ADMIN — HYDROCODONE BITARTRATE AND ACETAMINOPHEN 1 TABLET: 7.5; 325 TABLET ORAL at 14:46

## 2018-10-19 RX ADMIN — ONDANSETRON 4 MG: 2 INJECTION INTRAMUSCULAR; INTRAVENOUS at 10:15

## 2018-10-19 RX ADMIN — VANCOMYCIN HYDROCHLORIDE 1250 MG: 10 INJECTION, POWDER, LYOPHILIZED, FOR SOLUTION INTRAVENOUS at 08:03

## 2018-10-19 RX ADMIN — FENTANYL CITRATE 40 MCG: 50 INJECTION, SOLUTION INTRAMUSCULAR; INTRAVENOUS at 10:20

## 2018-10-19 RX ADMIN — FENTANYL CITRATE 25 MCG: 50 INJECTION, SOLUTION INTRAMUSCULAR; INTRAVENOUS at 08:34

## 2018-10-19 RX ADMIN — FAMOTIDINE 20 MG: 20 TABLET ORAL at 06:30

## 2018-10-19 RX ADMIN — HYDROCODONE BITARTRATE AND ACETAMINOPHEN 2 TABLET: 7.5; 325 TABLET ORAL at 19:18

## 2018-10-19 RX ADMIN — HYDROMORPHONE HYDROCHLORIDE 0.5 MG: 1 INJECTION, SOLUTION INTRAMUSCULAR; INTRAVENOUS; SUBCUTANEOUS at 10:55

## 2018-10-19 RX ADMIN — PHENYLEPHRINE HYDROCHLORIDE 80 MCG: 10 INJECTION INTRAVENOUS at 08:01

## 2018-10-19 RX ADMIN — METHOCARBAMOL 750 MG: 750 TABLET ORAL at 22:06

## 2018-10-19 RX ADMIN — SODIUM CHLORIDE, POTASSIUM CHLORIDE, SODIUM LACTATE AND CALCIUM CHLORIDE 9 ML/HR: 600; 310; 30; 20 INJECTION, SOLUTION INTRAVENOUS at 06:31

## 2018-10-19 RX ADMIN — FENTANYL CITRATE 75 MCG: 50 INJECTION, SOLUTION INTRAMUSCULAR; INTRAVENOUS at 07:45

## 2018-10-19 RX ADMIN — ROCURONIUM BROMIDE 10 MG: 10 SOLUTION INTRAVENOUS at 07:57

## 2018-10-19 RX ADMIN — DEXAMETHASONE SODIUM PHOSPHATE 8 MG: 4 INJECTION, SOLUTION INTRAMUSCULAR; INTRAVENOUS at 07:59

## 2018-10-19 RX ADMIN — ROCURONIUM BROMIDE 10 MG: 10 SOLUTION INTRAVENOUS at 08:31

## 2018-10-19 RX ADMIN — EPHEDRINE SULFATE 10 MG: 50 INJECTION INTRAMUSCULAR; INTRAVENOUS; SUBCUTANEOUS at 08:00

## 2018-10-19 RX ADMIN — ROCURONIUM BROMIDE 40 MG: 10 SOLUTION INTRAVENOUS at 07:45

## 2018-10-19 RX ADMIN — FENTANYL CITRATE 25 MCG: 50 INJECTION, SOLUTION INTRAMUSCULAR; INTRAVENOUS at 09:12

## 2018-10-19 NOTE — ANESTHESIA POSTPROCEDURE EVALUATION
Patient: Rey Garrison Jr.    Procedure Summary     Date:  10/19/18 Room / Location:   [facility] / [facility]    Anesthesia Start:  0733 Anesthesia Stop:      Procedure:  CERVICAL DISCECTOMY ANTERIOR WITH FUSION C5-7 (Bilateral Spine Cervical) Diagnosis:       Cervical spondylosis with myelopathy      (Cervical spondylosis with myelopathy [M47.12])    Surgeon:  Ata Stoddard MD Provider:  Brandon Meadows MD    Anesthesia Type:  general ASA Status:  3          Anesthesia Type: general  Last vitals  /61  116/69 (10/19/18 0625)   Temp 97  98.6 °F (37 °C) (10/19/18 0625)   Pulse 80  71 (10/19/18 0625)   Resp      16   SpO2 97  95 % (10/19/18 0625)     Post Anesthesia Care and Evaluation    Patient location during evaluation: PACU  Patient participation: complete - patient participated  Level of consciousness: awake and alert  Pain score: 0  Pain management: adequate  Airway patency: patent  Anesthetic complications: No anesthetic complications  PONV Status: none  Cardiovascular status: hemodynamically stable and acceptable  Respiratory status: nonlabored ventilation, acceptable and nasal cannula  Hydration status: acceptable

## 2018-10-19 NOTE — H&P
Patient Care Team:      Chief complaint  Right leg weakness    Subjective:    Patient is a 68 y.o.male presents with a several year history of right leg weakness and unstable gait.   He has stated that sometimes feels he is dragging it.  He also has some tingling in both hands, most notably in last 2 fingers, and difficulty opening left hand at times  Review of Systems:  General ROS: negative  Cardiovascular ROS: no chest pain or dyspnea on exertion says he has been told recently that he has some extra beats   Respiratory ROS: no cough, shortness of breath, or wheezing  Remote history of hemoptysis      Allergies:   Allergies   Allergen Reactions   • Penicillamine Unknown (See Comments)     As a child    • Penicillins Itching     Causes both the hands and the feet to itch    • Sulfa Antibiotics Itching     Causes both the hands and the feet to itch           Latex: neg  Contrast Dye neg    Home Meds    Prescriptions Prior to Admission   Medication Sig Dispense Refill Last Dose   • albuterol (PROAIR HFA) 108 (90 BASE) MCG/ACT inhaler Inhale 2 puffs Every 6 (Six) Hours As Needed for Wheezing. 1 inhaler 3 Past Week at Unknown time   • aspirin (ASPIRIN LOW DOSE) 81 MG EC tablet Take 81 mg by mouth Daily.   10/18/2018 at 0800   • B Complex Vitamins (B-COMPLEX/B-12 PO) Take 1 tablet by mouth Daily.   10/19/2018 at 0430   • BREO ELLIPTA 100-25 MCG/INH aerosol powder  INHALE 1 PUFF BY MOUTH ONCE DAILY 60 each 0 Past Week at Unknown time   • chlorhexidine (HIBICLENS) 4 % external liquid Shower each day with solution for 5 days beginning 5 days before surgery. 120 mL 0 10/18/2018 at 2100   • doxazosin (CARDURA) 4 MG tablet TAKE 2 TABLETS BY MOUTH EVERY MORNING. 60 tablet 5 10/19/2018 at 0430   • finasteride (PROSCAR) 5 MG tablet TAKE 1 TABLET BY MOUTH DAILY  3 10/19/2018 at 0430   • Fluticasone Furoate-Vilanterol (BREO ELLIPTA) 100-25 MCG/INH aerosol powder  Inhale 1 puff Daily. 28 each 0 Past Week at Unknown time   •  "Multiple Vitamin (MULTI-VITAMIN DAILY PO) Take 1 tablet by mouth Daily.   10/19/2018 at 0430   • sildenafil (VIAGRA) 100 MG tablet Take 1 tablet by mouth Daily As Needed for erectile dysfunction. 5 tablet 5 10/18/2018 at 0800   • Testosterone Cypionate (DEPOTESTOTERONE CYPIONATE) 200 MG/ML injection Inject 1.5 mL into the shoulder, thigh, or buttocks Every 14 (Fourteen) Days. 10 mL 3 Past Week at Unknown time   • tiotropium (SPIRIVA HANDIHALER) 18 MCG per inhalation capsule Place 1 capsule into inhaler and inhale Daily. 60 capsule 1 10/19/2018 at 00\430   • B-D 3CC LUER-JAVI SYR 96QJ4-6/2 21G X 1-1/2\" 3 ML misc USE ONE EVERY TWO WEEKS TO INJECT THE TESTOSTERONE. 10 each 0 Unknown at Unknown time   • B-D 3CC LUER-JAVI SYR 57XH3-9/4 2G X 1-1/4\" 3 ML misc USE WITH TESTOSTERONE 10 each 0 Unknown at Unknown time   • B-D 3CC LUER-JAVI SYR 26MV4-7/2 23G X 1-1/2\" 3 ML misc USE WITH TESTOSTERONE 100 each 0 Unknown at Unknown time     PMH:   Past Medical History:   Diagnosis Date   • Benign prostatic hypertrophy    • COPD (chronic obstructive pulmonary disease) (CMS/HCC)    • Coughing up blood     History of   • Depression with anxiety    • Dermatitis    • Hearing loss    • Hiatal hernia    • History of acute pharyngitis    • History of histoplasmosis    • Hypertension    • Hypogonadism male    • Left inguinal hernia    • Mediastinal lymphadenopathy    • Sciatica of right side    • Tobacco abuse    • Type 2 diabetes mellitus (CMS/HCC)      PSH:    Past Surgical History:   Procedure Laterality Date   • COLONOSCOPY W/ POLYPECTOMY      Pathology consistent with hyperplastic polyp   • HERNIA REPAIR     • INGUINAL HERNIA REPAIR  2014   • KNEE ARTHROSCOPY W/ MENISCAL REPAIR      Medial and lateral meniscus repair   • NASAL POLYP EXCISION       Immunization History: pneumo up to date   Flu  2017  Tetanus  ?  Social History:   Tobacco 3/4 pack per day   Alcohol occasional      Physical Exam:/69 (BP Location: Right arm, Patient " "Position: Lying)   Pulse 71   Temp 98.6 °F (37 °C) (Temporal Artery )   Ht 172.7 cm (67.99\")   Wt 88.5 kg (195 lb 1.7 oz)   SpO2 95%   BMI 29.67 kg/m²       General Appearance:    Alert, cooperative, no distress, appears stated age   Head:    Normocephalic, without obvious abnormality, atraumatic   Lungs:     Clear to auscultation bilaterally, respirations unlabored    Heart: Regular rate and rhythm frequent ectopic beat, S1 and S2 normal, no murmur, rub    or gallop    Abdomen:    Soft without tenderness   Breast Exam:    deferred   Genitalia:    deferred   Extremities:   Extremities normal, atraumatic, no cyanosis or edema   Skin:   Skin color, texture, turgor normal, no rashes or lesions   Neurologic:   Grossly intact         Impression: cervical myelopathy    Plan:  ACDF C4-5, C5-6 possible C5 corpectomy, intraoperative monitoring  Ninfa Chávez PA-C 10/19/2018 6:58 AM        "

## 2018-10-19 NOTE — OP NOTE
Preoperative diagnosis cervical disc disease cervical spondylitic myelopathy    Postoperative diagnosis same    Procedures  1.  Anterior cervical discectomy C5 6 C6 7  2.  Anterior arthrodesis C5 6 C6 7  3.  Placement of the vG2 bone graft C6 7, Cloward technique C5 6  4.  Anterior segmental instrumentation using Depuy Slimline plate and screws 16 mm  5.  Fluoroscopy to confirm level intraoperative    Gen. endotracheal anesthesia    Surgeon Ata Stoddard M.D.  Assistant Kilo Cazares    Minimal blood loss  Applications none  Procedure in detail    After formal written consent was obtained explaining risks and benefits of procedure patient was taken to operating room.  All bony prominences and genitalia were padded to prevent neurologic injury.  Preoperative antimicrobial prophylaxis was given per protocol.  Patient was placed in neutral C-spine and prepped and draped in usual sterile manner.  Anterior incision was made after local infiltration per record.  Multiple surgical options were discussed with the patient he has continued to smoke despite counseling.  Given the degree of stenosis present I explained the need for front back surgery as a possibility adjunct given his cervical disc disease    Dissection was carried down to skin and subcutaneous tissues.  The carotid artery and tracheoesophageal bundle were gently dissected and mobilized respectively to allow access to the anterior longitudinal spine and ligament.  At this point in time soft tissues were cleared using blunt dissection fluoroscopic guidance identified the correct levels.  At this point in time a thorough discectomy was performed after placement of distraction pins.  The posterior ligament was identified after thorough discectomy and drilled down using high-speed bur the posterior longitudinal ligament was removed and the disc space was removed and explored to ensure adequate decompression of the nerves and nerve roots respectively.   Fluoroscopic guidance confirmed correct levels and decompression attention was first turned to the C5 6 level at which time a Cloward approach was used given the expansile osteophyte ventral to the spine and this was drilled down in its entirety and a bone dowel was fashioned appropriately.    The distraction pins were then moved inferiorly to the C6 7 level and subsequently drilling of this level was performed with discectomy being performed in its entirety    At this point time the anterior vg2 bone graft was placed after being milled to the appropriate size, and the anterior plating system was placed and torqued to appropriate tightness.  Fluoroscopic imaging identified again the correct level meticulous hemostasis maintained and the skin was closed in layers.  SSEP monitoring was stable throughout the procedure    I performed the entire surgery till the closure of the skin.

## 2018-10-19 NOTE — ANESTHESIA PREPROCEDURE EVALUATION
Anesthesia Evaluation     NPO Solid Status: > 8 hours  NPO Liquid Status: > 8 hours           Airway   Mallampati: II  TM distance: >3 FB  Neck ROM: full  Dental    (+) poor dentition    Pulmonary    (+) a smoker Current, decreased breath sounds,   (-) asthma  Cardiovascular     ECG reviewed  Rhythm: regular  Rate: normal    (+) hypertension,   (-) pacemaker, angina, murmur, cardiac stents      Neuro/Psych  (-) seizures, CVA  GI/Hepatic/Renal/Endo    (-) liver disease    Musculoskeletal     Abdominal    Substance History      OB/GYN          Other            Phys Exam Other: Patient able to fully flex his neck, some decrease in extension              Anesthesia Plan    ASA 3     general     intravenous induction   Anesthetic plan, all risks, benefits, and alternatives have been provided, discussed and informed consent has been obtained with: patient.    Plan discussed with CRNA.

## 2018-10-19 NOTE — PLAN OF CARE
Problem: Patient Care Overview  Goal: Plan of Care Review   10/19/18 8672   Coping/Psychosocial   Plan of Care Reviewed With patient   Plan of Care Review   Progress improving   OTHER   Outcome Summary A/O x 4, pain has been well controlled with PO pain meds, up with assist x 1, voiding spontaneously, neuro checks WNL

## 2018-10-19 NOTE — ANESTHESIA PROCEDURE NOTES
Airway  Urgency: elective    Airway not difficult    General Information and Staff    Patient location during procedure: OR    Indications and Patient Condition  Indications for airway management: airway protection    Preoxygenated: yes  MILS not maintained throughout  Mask difficulty assessment: 1 - vent by mask    Final Airway Details  Final airway type: endotracheal airway      Successful airway: ETT  Cuffed: yes   Successful intubation technique: video laryngoscopy  Facilitating devices/methods: intubating stylet  Endotracheal tube insertion site: oral  Blade: Marleen  Blade size: 3  ETT size: 7.5 mm  Cormack-Lehane Classification: grade I - full view of glottis  Placement verified by: chest auscultation and capnometry   Measured from: lips  ETT to lips (cm): 20  Number of attempts at approach: 1    Additional Comments  Glidescope use r/t cervical surgery Negative epigastric sounds, Breath sound equal bilaterally with symmetric chest rise and fall

## 2018-10-20 ENCOUNTER — HOSPITAL ENCOUNTER (EMERGENCY)
Facility: HOSPITAL | Age: 68
Discharge: HOME | End: 2018-10-20
Attending: STUDENT IN AN ORGANIZED HEALTH CARE EDUCATION/TRAINING PROGRAM | Admitting: STUDENT IN AN ORGANIZED HEALTH CARE EDUCATION/TRAINING PROGRAM

## 2018-10-20 VITALS
TEMPERATURE: 97.6 F | BODY MASS INDEX: 29.57 KG/M2 | HEIGHT: 68 IN | RESPIRATION RATE: 16 BRPM | OXYGEN SATURATION: 95 % | HEART RATE: 65 BPM | SYSTOLIC BLOOD PRESSURE: 113 MMHG | DIASTOLIC BLOOD PRESSURE: 65 MMHG | WEIGHT: 195.11 LBS

## 2018-10-20 VITALS
OXYGEN SATURATION: 96 % | SYSTOLIC BLOOD PRESSURE: 150 MMHG | BODY MASS INDEX: 28.79 KG/M2 | TEMPERATURE: 98.6 F | HEIGHT: 68 IN | RESPIRATION RATE: 16 BRPM | HEART RATE: 67 BPM | WEIGHT: 190 LBS | DIASTOLIC BLOOD PRESSURE: 71 MMHG

## 2018-10-20 DIAGNOSIS — R58 BLEEDING: Primary | ICD-10-CM

## 2018-10-20 PROCEDURE — 99024 POSTOP FOLLOW-UP VISIT: CPT | Performed by: NEUROLOGICAL SURGERY

## 2018-10-20 PROCEDURE — 99283 EMERGENCY DEPT VISIT LOW MDM: CPT

## 2018-10-20 RX ORDER — METHOCARBAMOL 750 MG/1
750 TABLET, FILM COATED ORAL 3 TIMES DAILY PRN
Qty: 60 TABLET | Refills: 0 | Status: SHIPPED | OUTPATIENT
Start: 2018-10-20 | End: 2019-04-25

## 2018-10-20 RX ORDER — PSEUDOEPHEDRINE HCL 30 MG
100 TABLET ORAL 2 TIMES DAILY PRN
Qty: 30 CAPSULE | Refills: 0 | Status: SHIPPED | OUTPATIENT
Start: 2018-10-20 | End: 2019-04-25

## 2018-10-20 RX ORDER — HYDROCODONE BITARTRATE AND ACETAMINOPHEN 7.5; 325 MG/1; MG/1
2 TABLET ORAL EVERY 4 HOURS PRN
Qty: 40 TABLET | Refills: 0 | Status: SHIPPED | OUTPATIENT
Start: 2018-10-20 | End: 2018-10-29

## 2018-10-20 RX ADMIN — HYDROCODONE BITARTRATE AND ACETAMINOPHEN 2 TABLET: 7.5; 325 TABLET ORAL at 08:11

## 2018-10-20 RX ADMIN — HYDROCODONE BITARTRATE AND ACETAMINOPHEN 2 TABLET: 7.5; 325 TABLET ORAL at 01:22

## 2018-10-20 RX ADMIN — METHOCARBAMOL 750 MG: 750 TABLET ORAL at 13:35

## 2018-10-20 RX ADMIN — METHOCARBAMOL 750 MG: 750 TABLET ORAL at 06:27

## 2018-10-20 RX ADMIN — TERAZOSIN HYDROCHLORIDE 5 MG: 5 CAPSULE ORAL at 08:11

## 2018-10-20 RX ADMIN — HYDROCODONE BITARTRATE AND ACETAMINOPHEN 2 TABLET: 7.5; 325 TABLET ORAL at 12:22

## 2018-10-20 NOTE — NURSING NOTE
Entered room to respond to active bed alarm, PT irritated by the alarm and demanded that they not be used.  Educated PT that he is not to get up without staff and that the alarms are for his safety.  PT verbalizes understanding.

## 2018-10-20 NOTE — PROGRESS NOTES
HOD# : 1    No events last night  stable    Cervical spondylosis with myelopathy    Cervical myelopathy with cervical radiculopathy      Temp:  [97 °F (36.1 °C)-98.2 °F (36.8 °C)] 98.2 °F (36.8 °C)  Heart Rate:  [56-94] 68  Resp:  [12-18] 16  BP: (106-143)/(61-81) 111/63  I/O last 3 completed shifts:  In: 1400 [P.O.:100; I.V.:1050; IV Piggyback:250]  Out: 1350 [Urine:1200; Drains:100; Blood:50]  No intake/output data recorded.  Vital signs were reviewed and documented in the chart      EXAM   Patient appeared in good neurologic function with normal comprehension   CN grossly intact  Moves all extremities to command  Still spastic in left   Legs same  Neck supple      PLAN   Home later will recheck drain output d/c drain. If still with output will consider keeping overnight  Hold asa for 3-5 days  Fu 3 weeks

## 2018-10-20 NOTE — PLAN OF CARE
Problem: Patient Care Overview  Goal: Plan of Care Review   10/20/18 1423 10/20/18 1426   Coping/Psychosocial   Plan of Care Reviewed With --  patient   Plan of Care Review   Progress --  improving   OTHER   Outcome Summary Pt to be discahrged today. IV removed. drain removed 40 ml noted for output since 2330 last night. Steri strips to incision. Covaderm applied. Discharge instructions given to patient. Prescriptions delivered by retail pharmacy.  --

## 2018-10-20 NOTE — PLAN OF CARE
Problem: Fall Risk (Adult)  Goal: Identify Related Risk Factors and Signs and Symptoms  Outcome: Outcome(s) achieved Date Met: 10/20/18   10/20/18 1423   Fall Risk (Adult)   Related Risk Factors (Fall Risk) environment unfamiliar   Signs and Symptoms (Fall Risk) presence of risk factors     Goal: Absence of Fall  Outcome: Outcome(s) achieved Date Met: 10/20/18   10/20/18 1423   Fall Risk (Adult)   Absence of Fall achieves outcome

## 2018-10-21 ENCOUNTER — READMISSION MANAGEMENT (OUTPATIENT)
Dept: CALL CENTER | Facility: HOSPITAL | Age: 68
End: 2018-10-21

## 2018-10-21 NOTE — OUTREACH NOTE
Prep Survey      Responses   Facility patient discharged from?  [place]   Is patient eligible?  Yes   Discharge diagnosis  Anterior cervical discectomy C5 6 C6 7,     Anterior arthrodesis C5 6 C6 7,     Placement of the vG2 bone graft C6 7   Does the patient have one of the following disease processes/diagnoses(primary or secondary)?  General Surgery   Does the patient have Home health ordered?  No   Is there a DME ordered?  No   Prep survey completed?  Yes          Gail Layton RN

## 2018-10-21 NOTE — ED PROVIDER NOTES
Subjective   68-year-old male that presents with concerns for bleeding from a post surgical drain removal wound.  The patient has a cervical surgery performed 2 days ago by Dr. Stoddard at [facility].  He was discharged today.  When he got home he got in an argument with his significant other during the course of screaming immediate bleeding from the drain site.  Currently is not bleeding.  He did call the hospital and was instructed to come to the closest emergency department.            Review of Systems   All other systems reviewed and are negative.      Past Medical History:   Diagnosis Date   • Benign prostatic hypertrophy    • COPD (chronic obstructive pulmonary disease) (CMS/HCC)    • Coughing up blood     History of   • Depression with anxiety    • Dermatitis    • Hearing loss    • Hiatal hernia    • History of acute pharyngitis    • History of histoplasmosis    • Hypertension    • Hypogonadism male    • Left inguinal hernia    • Mediastinal lymphadenopathy    • Sciatica of right side    • Tobacco abuse    • Type 2 diabetes mellitus (CMS/HCC)        Allergies   Allergen Reactions   • Penicillamine Unknown (See Comments)     As a child    • Penicillins Itching     Causes both the hands and the feet to itch    • Sulfa Antibiotics Itching     Causes both the hands and the feet to itch        Past Surgical History:   Procedure Laterality Date   • ANTERIOR CERVICAL DISCECTOMY W/ FUSION Bilateral 10/19/2018    Procedure: CERVICAL DISCECTOMY ANTERIOR WITH FUSION C5-7;  Surgeon: Ata Stoddard MD;  Location: [facility];  Service: Neurosurgery   • COLONOSCOPY W/ POLYPECTOMY      Pathology consistent with hyperplastic polyp   • HERNIA REPAIR     • INGUINAL HERNIA REPAIR  2014   • KNEE ARTHROSCOPY W/ MENISCAL REPAIR      Medial and lateral meniscus repair   • NASAL POLYP EXCISION         Family History   Problem Relation Age of Onset   • Dementia Mother    • Prostate cancer Father        Social History      Social History   • Marital status:      Social History Main Topics   • Smoking status: Current Every Day Smoker     Packs/day: 1.25     Years: 40.00     Types: Cigarettes   • Smokeless tobacco: Never Used   • Alcohol use 3.0 oz/week     5 Standard drinks or equivalent per week      Comment: 5 alcoholic beverages per week   • Drug use: Yes     Types: Marijuana      Comment: social use; last use 10-17-18   • Sexual activity: Yes     Partners: Male     Other Topics Concern   • Not on file           Objective   Physical Exam   Nursing note and vitals reviewed.    GEN: No acute distress  Head: Normocephalic, atraumatic  Eyes: Pupils equal round reactive to light  ENT: Posterior pharynx normal in appearance, oral mucosa is moist  Neck: Right lower neck there are Steri-Strips covering a postoperative wound does appear clean dry and intact.  Underneath a dressing just medial to this there is a small hole that would be compatible with coronary drain was that currently is not bleeding.  Chest: Nontender to palpation  Cardiovascular: Regular rate  Lungs: Clear to auscultation bilaterally  Abdomen: Soft, nontender, nondistended, no peritoneal signs  Extremities: No edema, normal appearance  Neuro: GCS 15  Psych: Mood and affect are appropriate    Procedures           ED Course                  MDM  Number of Diagnoses or Management Options  Bleeding:   Diagnosis management comments: Wound appears healthy and is not bleeding at this time.  Did stress the patient should not talk anymore than necessary at this time or move his neck.       Amount and/or Complexity of Data Reviewed  Decide to obtain previous medical records or to obtain history from someone other than the patient: yes  Obtain history from someone other than the patient: yes  Review and summarize past medical records: yes          Final diagnoses:   Bleeding            Natanael Noble MD  10/20/18 2017

## 2018-10-21 NOTE — DISCHARGE INSTRUCTIONS
Avoid unnecessary talking or movement of neck.  Change dressings as needed.  Follow with Dr. Stoddard as needed.

## 2018-10-22 ENCOUNTER — TRANSITIONAL CARE MANAGEMENT TELEPHONE ENCOUNTER (OUTPATIENT)
Dept: FAMILY MEDICINE CLINIC | Facility: CLINIC | Age: 68
End: 2018-10-22

## 2018-10-22 ENCOUNTER — EPISODE CHANGES (OUTPATIENT)
Dept: SOCIAL WORK | Facility: HOSPITAL | Age: 68
End: 2018-10-22

## 2018-10-22 NOTE — OUTREACH NOTE
SHERIE call completed.  Please refer to TCM call flowsheet for call documentation.    Pt returned my call. He states he had a bad first couple days post op d/t pain. He hasn't had to take any pain meds today except the muscle relaxer, rating his pain 2/10 right now. He states up ambulating. He denies any active bleeding from incision, just some dried blood. He does report having constipation, hasn't had a BM since last Thurs. He states taking docusate bid as rx'd. He denies any n/v, abd pain, discomfort, bloating, fever/chills, sob, or chest pain. He also reports since surgery having the feeling of not emptying his bladder fully after urinating. Advised on appt and pt agreeable. A PCP/SHERIE appt scheduled with Dr Lovett 10/24/18.

## 2018-10-23 ENCOUNTER — EPISODE CHANGES (OUTPATIENT)
Dept: CASE MANAGEMENT | Facility: OTHER | Age: 68
End: 2018-10-23

## 2018-10-23 ENCOUNTER — READMISSION MANAGEMENT (OUTPATIENT)
Dept: CALL CENTER | Facility: HOSPITAL | Age: 68
End: 2018-10-23

## 2018-10-23 NOTE — OUTREACH NOTE
General Surgery Week 1 Survey      Responses   Facility patient discharged from?  [place]   Does the patient have one of the following disease processes/diagnoses(primary or secondary)?  General Surgery   Is there a successful TCM telephone encounter documented?  Yes          Boone Beckett RN

## 2018-10-24 ENCOUNTER — OFFICE VISIT (OUTPATIENT)
Dept: FAMILY MEDICINE CLINIC | Facility: CLINIC | Age: 68
End: 2018-10-24

## 2018-10-24 VITALS
TEMPERATURE: 97.4 F | SYSTOLIC BLOOD PRESSURE: 110 MMHG | RESPIRATION RATE: 20 BRPM | HEART RATE: 87 BPM | DIASTOLIC BLOOD PRESSURE: 64 MMHG | WEIGHT: 193.4 LBS | BODY MASS INDEX: 29.31 KG/M2 | HEIGHT: 68 IN | OXYGEN SATURATION: 95 %

## 2018-10-24 DIAGNOSIS — Z23 NEED FOR IMMUNIZATION AGAINST INFLUENZA: Primary | ICD-10-CM

## 2018-10-24 DIAGNOSIS — M47.12 CERVICAL SPONDYLOSIS WITH MYELOPATHY: ICD-10-CM

## 2018-10-24 PROCEDURE — 99495 TRANSJ CARE MGMT MOD F2F 14D: CPT | Performed by: FAMILY MEDICINE

## 2018-10-24 PROCEDURE — 90662 IIV NO PRSV INCREASED AG IM: CPT | Performed by: FAMILY MEDICINE

## 2018-10-24 PROCEDURE — G0008 ADMIN INFLUENZA VIRUS VAC: HCPCS | Performed by: FAMILY MEDICINE

## 2018-10-24 NOTE — PROGRESS NOTES
Transitional Care Follow Up Visit  Subjective     Rey Garrison Jr. is a 68 y.o. male who presents for a transitional care management visit.    Within 48 business hours after discharge our office contacted him via telephone to coordinate his care and needs.      I reviewed and discussed the details of that call along with the discharge summary, hospital problems, inpatient lab results, inpatient diagnostic studies, and consultation reports with Rey.    States he is doing some better. Balance and gait still not much better.  Denies any chest pain or shortness of breath.    Has been wearing a nicotine patch and has helped with smoking cessation. Has cut way back.    Has had some constipation.     Current outpatient and discharge medications have been reconciled for the patient.    Date of TCM Phone Call 10/22/2018   [facility]   Date of Admission 10/19/2018   Date of Discharge 10/20/2018   Discharge Disposition Home or Self Care     Risk for Readmission (LACE) Score: 7 (10/20/2018  6:00 AM)    History of Present Illness   Course During Hospital Stay:     Patient is a 68 y.o.male presents with a several year history of right leg weakness and unstable gait.   He has stated that sometimes feels he is dragging it.  He also has some tingling in both hands, most notably in last 2 fingers, and difficulty opening left hand at times    I discussed and there's been no changes with the plan I have booked him for multilevel corpectomy however I explained that given his ongoing smoking status and the need for potential posterior surgery that the goal of the surgery is to decompress the C5 6 level as well as C6 7 level with possibilities including 2 level ACDF versus corpectomy at C5 with adjacent level ACDF etc.    The following portions of the patient's history were reviewed and updated as appropriate: allergies, current medications, past family history, past medical history, past social history,  past surgical history and problem list.    Review of Systems   Respiratory: Negative for shortness of breath.    Cardiovascular: Negative for chest pain.   Gastrointestinal: Positive for constipation.       Objective   Physical Exam   Constitutional: He appears well-developed and well-nourished.   Cardiovascular: Normal rate and regular rhythm.    Pulmonary/Chest: Effort normal and breath sounds normal.   Nursing note and vitals reviewed.      Assessment/Plan   Problems Addressed this Visit        Nervous and Auditory    Cervical spondylosis with myelopathy      Other Visit Diagnoses     Need for immunization against influenza    -  Primary        Ruben is a healing up well from his surgery.  There has been minimal change in his neurologic status, but of course we would expect that.  He has no problems with his wound and that he is had no problems with either his airway.  He did have some difficulty with posterior neck pain but that is improved over the last 2 days.  No difficulty with breathing.        Drink plenty fluids. Get plenty fiber in diet,  Use a stool softener daily.  Use OTC Miralax daily if needed.    Continue the 14 mg nicotine patch as doing.    Continue medications as doing.    Given a high dose flu vaccine.    Follow up as needed.            Scribed for Dr Sebastian Lovett by Christiana Rasmussen CMA.          I, Sebastian Lovett MD, personally performed the services described in this documentation, as scribed by Christiana Rasmussen in my presence, and is both accurate and complete.

## 2018-10-25 NOTE — DISCHARGE SUMMARY
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 male presented witcomplex medical history and tobacco abuse experienced the onset of left upper extremity spasticity and shoulder pain as well as gait disturbance and underwent workup and referral he was noted to have severe cervical spondylitic myelopathy and sent to us for evaluation    His hospital course his he is wouldn't admitted he underwent uncomplicated surgery it was done under monitoring there is no changes in his exam postsurgically he appeared in good condition he was able to eat move all his extremities at his baseline he was swallowing well soreness throat and appear expected condition    He was being discharged home after a JOVANNY drain was removed and his pain was under control he was in good condition able to move his arms and legs without evidence of significant weakness    Condition on Discharge:  Stable  Discharge Home with assistive familyPATIENT SPECIFIC EDUCATION/PLAN:  1. Follow-up  Dr. Stoddard  2. No driving until follow-up.  3. The patient may get incision wet in the shower beginning on   dressing off and may shower in 48 hours   4. NO tub bathing or swimming until follow-up  5. Ice pack to incision(s) as needed for associated pain or swelling       Discharge Medications     Discharge Medications      New Medications      Instructions Start Date   HYDROcodone-acetaminophen 7.5-325 MG per tablet  Commonly known as:  NORCO   2 tablets, Oral, Every 4 Hours PRN     "  methocarbamol 750 MG tablet  Commonly known as:  ROBAXIN   750 mg, Oral, 3 Times Daily PRN      STOOL SOFTENER 100 MG capsule  Generic drug:  docusate sodium   100 mg, Oral, 2 Times Daily PRN         Continue These Medications      Instructions Start Date   albuterol 108 (90 Base) MCG/ACT inhaler  Commonly known as:  PROAIR HFA   2 puffs, Inhalation, Every 6 Hours PRN      B-COMPLEX/B-12 PO   1 tablet, Oral, Daily      B-D 3CC LUER-JVAI SYR 28XT9-5/4 2G X 1-1/4\" 3 ML misc  Generic drug:  Syringe/Needle (Disp)   USE WITH TESTOSTERONE      B-D 3CC LUER-JAVI SYR 76QM3-9/2 21G X 1-1/2\" 3 ML misc  Generic drug:  Syringe/Needle (Disp)   USE ONE EVERY TWO WEEKS TO INJECT THE TESTOSTERONE.      B-D 3CC LUER-JAVI SYR 63RG3-4/2 23G X 1-1/2\" 3 ML misc  Generic drug:  Syringe/Needle (Disp)   USE WITH TESTOSTERONE      chlorhexidine 4 % external liquid  Commonly known as:  HIBICLENS   Shower each day with solution for 5 days beginning 5 days before surgery.      doxazosin 4 MG tablet  Commonly known as:  CARDURA   8 mg, Oral, Every Morning      finasteride 5 MG tablet  Commonly known as:  PROSCAR   TAKE 1 TABLET BY MOUTH DAILY      Fluticasone Furoate-Vilanterol 100-25 MCG/INH aerosol powder   Commonly known as:  BREO ELLIPTA   1 puff, Inhalation, Daily      BREO ELLIPTA 100-25 MCG/INH aerosol powder   Generic drug:  Fluticasone Furoate-Vilanterol   INHALE 1 PUFF BY MOUTH ONCE DAILY      MULTI-VITAMIN DAILY PO   1 tablet, Oral, Daily      sildenafil 100 MG tablet  Commonly known as:  VIAGRA   100 mg, Oral, Daily PRN      Testosterone Cypionate 200 MG/ML injection  Commonly known as:  DEPOTESTOTERONE CYPIONATE   300 mg, Intramuscular, Every 14 Days      tiotropium 18 MCG per inhalation capsule  Commonly known as:  SPIRIVA HANDIHALER   1 capsule, Inhalation, Daily - RT             Follow-up Appointments  Future Appointments  Date Time Provider Department Center   11/5/2018 10:00 AM Sebastian Lovett MD [facility] None   11/8/2018 " 11:00 AM Kilo Cazares PA-C [facility] None         Referring Provider  Ata Stoddard MD    PCP   Sebastian Lovett MD Christian N. Ramsey, MD  10/25/18  7:06 PM

## 2018-10-29 DIAGNOSIS — Z98.1 S/P CERVICAL SPINAL FUSION: Primary | ICD-10-CM

## 2018-10-31 ENCOUNTER — EPISODE CHANGES (OUTPATIENT)
Dept: CASE MANAGEMENT | Facility: OTHER | Age: 68
End: 2018-10-31

## 2018-10-31 ENCOUNTER — READMISSION MANAGEMENT (OUTPATIENT)
Dept: CALL CENTER | Facility: HOSPITAL | Age: 68
End: 2018-10-31

## 2018-10-31 NOTE — OUTREACH NOTE
General Surgery Week 2 Survey      Responses   Facility patient discharged from?  [place]   Does the patient have one of the following disease processes/diagnoses(primary or secondary)?  General Surgery   Week 2 attempt successful?  Yes   Call start time  [phone]   Call end time  [phone]   Discharge diagnosis  Anterior cervical discectomy C5 6 C6 7,     Anterior arthrodesis C5 6 C6 7,     Placement of the vG2 bone graft C6 7   Meds reviewed with patient/caregiver?  Yes   Is the patient having any side effects they believe may be caused by any medication additions or changes?  Yes   Side effects comments   Constipation   Does the patient have all medications related to this admission filled (includes all antibiotics, pain medications, etc.)  Yes   Is the patient taking all medications as directed (includes completed medication regime)?  Yes   Does the patient have a follow up appointment scheduled with their surgeon?  Yes   Has the patient kept scheduled appointments due by today?  Yes   Psychosocial issues?  No   Did the patient receive a copy of their discharge instructions?  Yes   Nursing interventions  Reviewed instructions with patient   What is the patient's perception of their health status since discharge?  Improving   Nursing interventions  Nurse provided patient education   Is the patient /caregiver able to teach back basic post-op care?  Keep incision areas clean,dry and protected   Is the patient/caregiver able to teach back signs and symptoms of incisional infection?  Increased redness, swelling or pain at the incisonal site, Incisional warmth, Fever, Increased drainage or bleeding, Pus or odor from incision   Is the patient/caregiver able to teach back steps to recovery at home?  Set small, achievable goals for return to baseline health, Rest and rebuild strength, gradually increase activity, Practice good oral hygiene, Make a list of questions for surgeon's appointment, Eat a well-balance diet, Weigh daily    Is the patient/caregiver able to teach back the hierarchy of who to call/visit for symptoms/problems? PCP, Specialist, Home health nurse, Urgent Care, ED, 911  Yes   Additional teach back comments  Encouraged ambulation, apple juice, water and warmth to abdomen as possible solutions to constipation.   Week 2 call completed?  Yes          Candida Pressley RN

## 2018-11-05 ENCOUNTER — OFFICE VISIT (OUTPATIENT)
Dept: FAMILY MEDICINE CLINIC | Facility: CLINIC | Age: 68
End: 2018-11-05

## 2018-11-05 VITALS
TEMPERATURE: 97.6 F | BODY MASS INDEX: 29.8 KG/M2 | RESPIRATION RATE: 18 BRPM | SYSTOLIC BLOOD PRESSURE: 118 MMHG | OXYGEN SATURATION: 95 % | WEIGHT: 196.6 LBS | HEIGHT: 68 IN | HEART RATE: 85 BPM | DIASTOLIC BLOOD PRESSURE: 70 MMHG

## 2018-11-05 DIAGNOSIS — R73.9 HYPERGLYCEMIA: ICD-10-CM

## 2018-11-05 DIAGNOSIS — I10 ESSENTIAL HYPERTENSION: ICD-10-CM

## 2018-11-05 DIAGNOSIS — M79.7 FIBROMYOSITIS: Primary | ICD-10-CM

## 2018-11-05 LAB — HBA1C MFR BLD: 5.6 %

## 2018-11-05 PROCEDURE — 20552 NJX 1/MLT TRIGGER POINT 1/2: CPT | Performed by: FAMILY MEDICINE

## 2018-11-05 PROCEDURE — 83036 HEMOGLOBIN GLYCOSYLATED A1C: CPT | Performed by: FAMILY MEDICINE

## 2018-11-05 PROCEDURE — 99214 OFFICE O/P EST MOD 30 MIN: CPT | Performed by: FAMILY MEDICINE

## 2018-11-05 RX ORDER — METHYLPREDNISOLONE ACETATE 40 MG/ML
40 INJECTION, SUSPENSION INTRA-ARTICULAR; INTRALESIONAL; INTRAMUSCULAR; SOFT TISSUE ONCE
Status: COMPLETED | OUTPATIENT
Start: 2018-11-05 | End: 2018-11-05

## 2018-11-05 RX ADMIN — METHYLPREDNISOLONE ACETATE 40 MG: 40 INJECTION, SUSPENSION INTRA-ARTICULAR; INTRALESIONAL; INTRAMUSCULAR; SOFT TISSUE at 10:59

## 2018-11-05 NOTE — PROGRESS NOTES
"Subjective   Rey Garrison Jr. is a 68 y.o. male seen today for No chief complaint on file..     History of Present Illness   The patient is here today with c/o right shoulder pain.    States he is having a lot of pain in his right shoulder since surgery.  Denies any chest pain or shortness of breath.    Also a follow up on Hypertension and Hyperglycemia.  A1C today is 5.6%.  Controlled with diet.    BP is 118/70.  Taking Doxazosin 4 mg nightly.      The following portions of the patient's history were reviewed and updated as appropriate: allergies, current medications, past social history and problem list.    Review of Systems   Respiratory: Negative for shortness of breath.    Cardiovascular: Negative for chest pain.   Musculoskeletal: Positive for arthralgias (right shoulder.).       Objective   /70   Pulse 85   Temp 97.6 °F (36.4 °C) (Temporal Artery )   Resp 18   Ht 172.7 cm (68\")   Wt 89.2 kg (196 lb 9.6 oz)   SpO2 95%   BMI 29.89 kg/m²   Physical Exam   Constitutional: He appears well-developed and well-nourished.   Cardiovascular: Normal rate and regular rhythm.    Pulmonary/Chest: Effort normal and breath sounds normal.   Musculoskeletal: He exhibits tenderness (right shoulder.).   Nursing note and vitals reviewed.      Assessment/Plan   Problem List Items Addressed This Visit        Cardiovascular and Mediastinum    Essential hypertension       Endocrine    Type 2 diabetes mellitus (CMS/HCC)      Other Visit Diagnoses     Fibromyositis    -  Primary      Inject Trigger Point, 1 or 2  Date/Time: 11/5/2018 10:47 AM  Performed by: NELLI ESCUDERO  Authorized by: NELLI ESCUDERO   Consent: Verbal consent obtained.  Consent given by: patient  Patient understanding: patient states understanding of the procedure being performed  Patient consent: the patient's understanding of the procedure matches consent given  Patient identity confirmed: verbally with patient  Local anesthesia used: " yes    Anesthesia:  Local anesthesia used: yes  Local Anesthetic: lidocaine 2% without epinephrine    Sedation:  Patient sedated: no  Patient tolerance: Patient tolerated the procedure well with no immediate complications  Comments: Right trapezius muscle injected with Depo Medrol 40 mg and 1 cc 2% lidocaine.              Drink plenty fluids.    Do shoulder stretching exercises twice a day.    Continue medications as doing.    Follow up in 2 months.                Scribed for Dr Sebastian Lovett by Christiana Rasmussen CMA.          I, Sebastian Lovett MD, personally performed the services described in this documentation, as scribed by Christiana Rasmussen in my presence, and is both accurate and complete.

## 2018-11-07 ENCOUNTER — READMISSION MANAGEMENT (OUTPATIENT)
Dept: CALL CENTER | Facility: HOSPITAL | Age: 68
End: 2018-11-07

## 2018-11-08 ENCOUNTER — EPISODE CHANGES (OUTPATIENT)
Dept: CASE MANAGEMENT | Facility: OTHER | Age: 68
End: 2018-11-08

## 2018-11-08 ENCOUNTER — EPISODE CHANGES (OUTPATIENT)
Dept: SOCIAL WORK | Facility: HOSPITAL | Age: 68
End: 2018-11-08

## 2018-11-12 ENCOUNTER — PATIENT OUTREACH (OUTPATIENT)
Dept: CASE MANAGEMENT | Facility: OTHER | Age: 68
End: 2018-11-12

## 2018-11-12 NOTE — OUTREACH NOTE
Care Management Plan 11/12/2018   Lifestyle Goals Routine follow-up with doctor(s)   Barriers Disease education   Self Management Medication Adherence   Annual Wellness Visit:  Patient Has Completed   Care Gaps Addressed Diabetic Eye Exam;Other (See Comment)   Care Gaps Addressed Zoster vaccine, Hepatitis C screening, Diab. foot exam   Specific Disease Process Teaching Hypertension;Diabetes   Does patient have depression diagnosis? Yes   Advanced Directives: Not Interested At This Time   Ed Visits past 12 months: 1   Hospitalizations past 12 months 1   Discharged From: [facility]   Discharged to: Home   Admit Date: 10-19-18   Discharge Date: 10-20-18   Discharge destination: Home   Medication Adherence Medications understood   Health Literacy Good     The main concerns and/or symptoms the patient would like to address are:   No concerns or symptoms voiced at this time.  Patient states he is getting along well.  Reports no further bleeding since ED visit 10/20/18.   Reports his incision is healing without a problem; his mobility is better since surgery.  Uses no assistive device for mobility. Is driving himself where necessary.  States he is independent with all daily activities.  Compliant with medications daily.      Education/instruction provided by Care Coordinator:   Discussed healing of surgical incision.  Discussed HTN and Diabetes management.  Patient stated he does not check his BP at home, but gets it checked when out, and it remains stable, without elevation.  Patient stated he does not check his blood sugar daily, but it has been stable.  States he is continuing to lose weight which helps his blood sugar..  Patient reports he has quit smoking as well.  Instructed on benefits of regular BP checks at home, low salt diet, exercise.  Reviewed Gaps in Care:  AWV is up to date; reminded of need for Zoster vaccine, Tdap, Hep C screening, Diabetic Foot and Eye Exam.  Patient stated Dr. Lovett usually  takes care that he gets all of that.  Discussed Advanced Directives; patient is not interested at this time.  Discussed My Chart; patient would like some information/ instructions on this sent to him.  Care Advisor will arrange mailing of My Chart info to patient.      Follow Up Outreach Due:   As needed.    Rosemary Gutierres RN

## 2018-11-26 ENCOUNTER — EPISODE CHANGES (OUTPATIENT)
Dept: CASE MANAGEMENT | Facility: OTHER | Age: 68
End: 2018-11-26

## 2018-12-13 ENCOUNTER — OFFICE VISIT (OUTPATIENT)
Dept: NEUROSURGERY | Facility: CLINIC | Age: 68
End: 2018-12-13

## 2018-12-13 VITALS — HEIGHT: 68 IN | BODY MASS INDEX: 30.31 KG/M2 | RESPIRATION RATE: 17 BRPM | WEIGHT: 200 LBS | TEMPERATURE: 96.6 F

## 2018-12-13 DIAGNOSIS — Z98.1 S/P CERVICAL SPINAL FUSION: Primary | ICD-10-CM

## 2018-12-13 DIAGNOSIS — G95.9 CERVICAL MYELOPATHY (HCC): ICD-10-CM

## 2018-12-13 PROCEDURE — 99024 POSTOP FOLLOW-UP VISIT: CPT | Performed by: NEUROLOGICAL SURGERY

## 2018-12-13 NOTE — PROGRESS NOTES
"  NAME: JEFFREY MARTINS JR.   DOS: 2018  : 1950  PCP: Sebastian Lovett MD    Chief Complaint:    Chief Complaint   Patient presents with   • S/p ACDF C5-6, C6-7 (10/19/18)       History of Present Illness:  68 y.o. male   Follow-up anterior cervical discectomy for cervical myelopathy doing well no evidence of swelling issues he reports better station and gait    PMHX  Allergies:  Allergies   Allergen Reactions   • Penicillamine Unknown (See Comments)     As a child    • Penicillins Itching     Causes both the hands and the feet to itch    • Sulfa Antibiotics Itching     Causes both the hands and the feet to itch      Medications    Current Outpatient Medications:   •  albuterol (PROAIR HFA) 108 (90 BASE) MCG/ACT inhaler, Inhale 2 puffs Every 6 (Six) Hours As Needed for Wheezing., Disp: 1 inhaler, Rfl: 3  •  B Complex Vitamins (B-COMPLEX/B-12 PO), Take 1 tablet by mouth Daily., Disp: , Rfl:   •  B-D 3CC LUER-JAVI SYR 52EC4-1/2 23G X 1-1/2\" 3 ML misc, USE WITH TESTOSTERONE, Disp: 100 each, Rfl: 0  •  BREO ELLIPTA 100-25 MCG/INH aerosol powder , INHALE 1 PUFF BY MOUTH ONCE DAILY, Disp: 60 each, Rfl: 0  •  chlorhexidine (HIBICLENS) 4 % external liquid, Shower each day with solution for 5 days beginning 5 days before surgery., Disp: 120 mL, Rfl: 0  •  docusate sodium 100 MG capsule, Take 100 mg by mouth 2 (Two) Times a Day As Needed for Constipation., Disp: 30 capsule, Rfl: 0  •  doxazosin (CARDURA) 4 MG tablet, TAKE 2 TABLETS BY MOUTH EVERY MORNING., Disp: 60 tablet, Rfl: 5  •  finasteride (PROSCAR) 5 MG tablet, TAKE 1 TABLET BY MOUTH DAILY, Disp: , Rfl: 3  •  Fluticasone Furoate-Vilanterol (BREO ELLIPTA) 100-25 MCG/INH aerosol powder , Inhale 1 puff Daily., Disp: 28 each, Rfl: 0  •  methocarbamol (ROBAXIN) 750 MG tablet, Take 1 tablet by mouth 3 (Three) Times a Day As Needed for Muscle Spasms., Disp: 60 tablet, Rfl: 0  •  Multiple Vitamin (MULTI-VITAMIN DAILY PO), Take 1 tablet by mouth Daily., Disp: , " Rfl:   •  sildenafil (VIAGRA) 100 MG tablet, Take 1 tablet by mouth Daily As Needed for erectile dysfunction., Disp: 5 tablet, Rfl: 5  •  Testosterone Cypionate (DEPOTESTOTERONE CYPIONATE) 200 MG/ML injection, Inject 1.5 mL into the shoulder, thigh, or buttocks Every 14 (Fourteen) Days., Disp: 10 mL, Rfl: 3  •  tiotropium (SPIRIVA HANDIHALER) 18 MCG per inhalation capsule, Place 1 capsule into inhaler and inhale Daily., Disp: 60 capsule, Rfl: 1  Past Medical History:  Past Medical History:   Diagnosis Date   • Benign prostatic hypertrophy    • COPD (chronic obstructive pulmonary disease) (CMS/HCC)    • Coughing up blood     History of   • Depression with anxiety    • Dermatitis    • Hearing loss    • Hiatal hernia    • History of acute pharyngitis    • History of histoplasmosis    • Hypertension    • Hypogonadism male    • Left inguinal hernia    • Mediastinal lymphadenopathy    • Sciatica of right side    • Tobacco abuse    • Type 2 diabetes mellitus (CMS/HCC)      Past Surgical History:  Past Surgical History:   Procedure Laterality Date   • COLONOSCOPY W/ POLYPECTOMY      Pathology consistent with hyperplastic polyp   • HERNIA REPAIR     • INGUINAL HERNIA REPAIR  2014   • KNEE ARTHROSCOPY W/ MENISCAL REPAIR      Medial and lateral meniscus repair   • NASAL POLYP EXCISION       Social Hx:  Social History     Tobacco Use   • Smoking status: Current Every Day Smoker     Packs/day: 1.25     Years: 40.00     Pack years: 50.00     Types: Cigarettes   • Smokeless tobacco: Never Used   Substance Use Topics   • Alcohol use: Yes     Alcohol/week: 3.0 oz     Types: 5 Standard drinks or equivalent per week     Comment: 5 alcoholic beverages per week   • Drug use: Yes     Types: Marijuana     Comment: social use; last use 10-17-18     Family Hx:  Family History   Problem Relation Age of Onset   • Dementia Mother    • Prostate cancer Father      Review of Systems:        Review of Systems   Constitutional: Negative for  activity change, appetite change, chills, diaphoresis, fatigue, fever and unexpected weight change.   HENT: Negative for congestion, dental problem, drooling, ear discharge, ear pain, facial swelling, hearing loss, mouth sores, nosebleeds, postnasal drip, rhinorrhea, sinus pressure, sneezing, sore throat, tinnitus, trouble swallowing and voice change.    Eyes: Negative for photophobia, pain, discharge, redness, itching and visual disturbance.   Respiratory: Negative for apnea, cough, choking, chest tightness, shortness of breath, wheezing and stridor.    Cardiovascular: Negative for chest pain, palpitations and leg swelling.   Gastrointestinal: Negative for abdominal distention, abdominal pain, anal bleeding, blood in stool, constipation, diarrhea, nausea, rectal pain and vomiting.   Endocrine: Negative for cold intolerance, heat intolerance, polydipsia, polyphagia and polyuria.   Genitourinary: Negative for decreased urine volume, difficulty urinating, dysuria, enuresis, flank pain, frequency, genital sores, hematuria and urgency.   Musculoskeletal: Positive for neck pain. Negative for arthralgias, back pain, gait problem, joint swelling, myalgias and neck stiffness.   Skin: Positive for wound. Negative for color change, pallor and rash.   Allergic/Immunologic: Negative for environmental allergies, food allergies and immunocompromised state.   Neurological: Negative for dizziness, tremors, seizures, syncope, facial asymmetry, speech difficulty, weakness, light-headedness, numbness and headaches.   Hematological: Negative for adenopathy. Does not bruise/bleed easily.   Psychiatric/Behavioral: Negative for agitation, behavioral problems, confusion, decreased concentration, dysphoric mood, hallucinations, self-injury, sleep disturbance and suicidal ideas. The patient is not nervous/anxious and is not hyperactive.    All other systems reviewed and are negative.   I have reviewed this note template and all pertinent  parts of the review of systems social, family history, surgical history and medication list        Physical Examination:  Vitals:    12/13/18 0931   Resp: 17   Temp: 96.6 °F (35.9 °C)      General Appearance:   Well developed, well nourished, well groomed, alert, and cooperative.  Neurological examination:  Neurologic Exam  He's awake alert follows commands    No evidence of dysphonia or dysarthria    He has good skin strength in his upper extremities    He has persistent Andrés's bilateral and perhaps a mild foot drop    Review of Imaging/DATA:    Diagnoses/Plan:    Mr. Garrison is a 68 y.o. male   He states he's doing much better he still has symptoms of myelopathy but overall his improvement is promising.  From my standpoint of explained the signs and symptoms to look for to necessitate referral back on very proud him for quitting smoking.  He's a call me if there is any issues

## 2019-01-07 DIAGNOSIS — J44.9 CHRONIC OBSTRUCTIVE PULMONARY DISEASE, UNSPECIFIED COPD TYPE (HCC): ICD-10-CM

## 2019-01-07 DIAGNOSIS — J43.2 CENTRILOBULAR EMPHYSEMA (HCC): ICD-10-CM

## 2019-01-07 DIAGNOSIS — N40.1 BENIGN NON-NODULAR PROSTATIC HYPERPLASIA WITH LOWER URINARY TRACT SYMPTOMS: ICD-10-CM

## 2019-01-07 RX ORDER — DOXAZOSIN MESYLATE 4 MG/1
8 TABLET ORAL EVERY MORNING
Qty: 60 TABLET | Refills: 5 | Status: SHIPPED | OUTPATIENT
Start: 2019-01-07 | End: 2019-10-14 | Stop reason: SDUPTHER

## 2019-01-07 RX ORDER — ALBUTEROL SULFATE 90 UG/1
2 AEROSOL, METERED RESPIRATORY (INHALATION) EVERY 6 HOURS PRN
Qty: 1 INHALER | Refills: 3 | Status: SHIPPED | OUTPATIENT
Start: 2019-01-07 | End: 2019-07-09 | Stop reason: SDUPTHER

## 2019-01-08 RX ORDER — SILDENAFIL 100 MG/1
100 TABLET, FILM COATED ORAL DAILY PRN
Qty: 5 TABLET | Refills: 0 | Status: SHIPPED | OUTPATIENT
Start: 2019-01-08 | End: 2019-04-22 | Stop reason: SDUPTHER

## 2019-01-10 ENCOUNTER — TELEPHONE (OUTPATIENT)
Dept: FAMILY MEDICINE CLINIC | Facility: CLINIC | Age: 69
End: 2019-01-10

## 2019-01-10 NOTE — TELEPHONE ENCOUNTER
----- Message from Remberto Dong Rep sent at 1/7/2019 10:25 AM EST -----  Regarding: MED REFILL BREO  PT NEEDS UPDATED SCRIPT FOR BREO SENT OVER OTHER MEDICATIONS FOR REFILLS

## 2019-02-27 DIAGNOSIS — E29.1 HYPOGONADISM MALE: ICD-10-CM

## 2019-03-01 RX ORDER — TESTOSTERONE CYPIONATE 200 MG/ML
INJECTION, SOLUTION INTRAMUSCULAR
Qty: 10 ML | Refills: 0 | OUTPATIENT
Start: 2019-03-01

## 2019-03-02 ENCOUNTER — TELEPHONE (OUTPATIENT)
Dept: FAMILY MEDICINE CLINIC | Facility: CLINIC | Age: 69
End: 2019-03-02

## 2019-03-02 NOTE — TELEPHONE ENCOUNTER
RX CALLED INTO [facility] IN [place]----- Message from Remberto Dong Rep sent at 3/1/2019 10:32 AM EST -----  Regarding: TJ  Contact: [phone]  PT NEEDS NEW SCRIPT FOR ABOVE MEDICATION

## 2019-03-29 DIAGNOSIS — J44.9 CHRONIC OBSTRUCTIVE PULMONARY DISEASE, UNSPECIFIED COPD TYPE (HCC): ICD-10-CM

## 2019-03-29 RX ORDER — TIOTROPIUM BROMIDE 18 UG/1
CAPSULE ORAL; RESPIRATORY (INHALATION)
Qty: 1 CAPSULE | Refills: 12 | Status: SHIPPED | OUTPATIENT
Start: 2019-03-29 | End: 2020-03-19

## 2019-04-22 ENCOUNTER — TELEPHONE (OUTPATIENT)
Dept: FAMILY MEDICINE CLINIC | Facility: CLINIC | Age: 69
End: 2019-04-22

## 2019-04-22 RX ORDER — SILDENAFIL 100 MG/1
100 TABLET, FILM COATED ORAL DAILY PRN
Qty: 15 TABLET | Refills: 5 | Status: SHIPPED | OUTPATIENT
Start: 2019-04-22 | End: 2020-10-05

## 2019-04-22 NOTE — TELEPHONE ENCOUNTER
----- Message from Sydney Mixon sent at 4/22/2019 10:49 AM EDT -----  PT WOULD LIKE A REFILL OF VIAGARA SENT TO [facility] IN [place]. HE INSISTED I TAKE THIS MESSAGE AND WOULD NOT CALL HIS PHARMACY....    Rx has been sent into the pharmacy.  GERSON Ingram

## 2019-04-25 ENCOUNTER — OFFICE VISIT (OUTPATIENT)
Dept: FAMILY MEDICINE CLINIC | Facility: CLINIC | Age: 69
End: 2019-04-25

## 2019-04-25 VITALS
WEIGHT: 200 LBS | DIASTOLIC BLOOD PRESSURE: 68 MMHG | TEMPERATURE: 98.1 F | OXYGEN SATURATION: 95 % | SYSTOLIC BLOOD PRESSURE: 100 MMHG | HEART RATE: 70 BPM | BODY MASS INDEX: 30.41 KG/M2 | RESPIRATION RATE: 20 BRPM

## 2019-04-25 DIAGNOSIS — G95.9 CERVICAL MYELOPATHY (HCC): ICD-10-CM

## 2019-04-25 DIAGNOSIS — E29.1 HYPOGONADISM MALE: Primary | ICD-10-CM

## 2019-04-25 DIAGNOSIS — B35.9 TINEA: ICD-10-CM

## 2019-04-25 PROCEDURE — 99214 OFFICE O/P EST MOD 30 MIN: CPT | Performed by: FAMILY MEDICINE

## 2019-04-25 RX ORDER — FLUCONAZOLE 150 MG/1
TABLET ORAL
Qty: 3 TABLET | Refills: 0 | Status: SHIPPED | OUTPATIENT
Start: 2019-04-25 | End: 2019-07-29

## 2019-04-25 NOTE — PROGRESS NOTES
Subjective   Rey Garrison Jr. is a 69 y.o. male seen today for Neck Pain and Hernia.     History of Present Illness   The patient is here today to follow up on neck pain.    States he is doing better since his neck surgery.  Balance and energy level is better. Has stopped smoking.  Denies any chest pain or shortness of breath.    States he does get up about twice each night to urinate. Taking Doxazosin 4 mg 2 each morning and Proscar 5 mg daily.    BP today is 100/68.   States he has been bruising very easily taking the low dose aspirin.  Cardiovascular risk were calculated and 10 year risk found to be 9.4 %.      Also c/o umbilical hernia. Denies any pain in his abdomen.    States he still has the yeast infection in his buttock area.    Wanting to a new Rx for Testosterone Cypionate.    The following portions of the patient's history were reviewed and updated as appropriate: allergies, current medications, past social history and problem list.    Review of Systems   Constitutional: Negative for fever.   HENT: Negative for congestion.    Respiratory: Negative for shortness of breath.    Cardiovascular: Negative for chest pain.   Gastrointestinal: Negative for abdominal pain.   Endocrine: Negative for polyuria.   Genitourinary: Negative for dysuria.   Musculoskeletal: Positive for back pain and gait problem.   Neurological: Positive for weakness. Negative for syncope, numbness and headaches.       Objective   /68   Pulse 70   Temp 98.1 °F (36.7 °C)   Resp 20   Wt 90.7 kg (200 lb)   SpO2 95%   BMI 30.41 kg/m²   Physical Exam   Constitutional: He is oriented to person, place, and time. He appears well-developed and well-nourished.   Cardiovascular: Normal rate and regular rhythm.   Pulmonary/Chest: Effort normal and breath sounds normal.   Abdominal: Soft. Bowel sounds are normal.   Neurological: He is alert and oriented to person, place, and time.   Skin: Rash noted.   Nursing note and vitals  reviewed.      Assessment/Plan   Problem List Items Addressed This Visit        Endocrine    Hypogonadism male - Primary      Other Visit Diagnoses     Tinea        Cervical myelopathy (CMS/HCC)                  Drink plenty fluids.    Change the Doxazosin 4 mg from 2 each morning to 1 twice a day.  Stop the Aspirin.    Continue other medications as doing.    Written Rx for Testosterone Cypionate 200 mg/ML taking 300 mg IM every 14 days #3 ML +5.  [facility] # [id number]    Follow up in 4 months fasting for general lab studies.                Scribed for Dr Sebastian Lovett by Christiana Rasmussen CMA.          I, Sebastian Lovett MD, personally performed the services described in this documentation, as scribed by Christiana Rasmussen in my presence, and is both accurate and complete.        (Please note that portions of this note were completed with a voice recognition program. Efforts were made to edit the dictations,but occasionally words are mis transcribed.)

## 2019-05-03 ENCOUNTER — PRIOR AUTHORIZATION (OUTPATIENT)
Dept: FAMILY MEDICINE CLINIC | Facility: CLINIC | Age: 69
End: 2019-05-03

## 2019-07-09 DIAGNOSIS — J43.2 CENTRILOBULAR EMPHYSEMA (HCC): ICD-10-CM

## 2019-07-09 RX ORDER — ALBUTEROL SULFATE 90 UG/1
AEROSOL, METERED RESPIRATORY (INHALATION)
Qty: 8.5 INHALER | Refills: 0 | Status: SHIPPED | OUTPATIENT
Start: 2019-07-09 | End: 2019-08-18 | Stop reason: SDUPTHER

## 2019-07-16 ENCOUNTER — TELEPHONE (OUTPATIENT)
Dept: NEUROSURGERY | Facility: CLINIC | Age: 69
End: 2019-07-16

## 2019-07-16 NOTE — TELEPHONE ENCOUNTER
Patient called back and said he is not talking about a brace, he is talking about a bone stimulator.  He is wearing it 4 hours a day.

## 2019-07-16 NOTE — TELEPHONE ENCOUNTER
Provider:  Richi  Caller: patient  Time of call:   12:23  Phone #:  [phone]  Surgery:  [facility]  Surgery Date:  10/19/18  Last visit:   12/13/18  Next visit: None    ABENA:         Reason for call:    Patient wants to know how long does he have to wear the ortho-fix?

## 2019-07-16 NOTE — TELEPHONE ENCOUNTER
He can come out of the brace. If he feels uncomfortable without it for the first few weeks he can only wear it when he is out walking or working. dc

## 2019-07-29 ENCOUNTER — OFFICE VISIT (OUTPATIENT)
Dept: FAMILY MEDICINE CLINIC | Facility: CLINIC | Age: 69
End: 2019-07-29

## 2019-07-29 VITALS
BODY MASS INDEX: 29.25 KG/M2 | HEART RATE: 75 BPM | HEIGHT: 68 IN | WEIGHT: 193 LBS | DIASTOLIC BLOOD PRESSURE: 70 MMHG | OXYGEN SATURATION: 95 % | TEMPERATURE: 97.9 F | SYSTOLIC BLOOD PRESSURE: 110 MMHG | RESPIRATION RATE: 16 BRPM

## 2019-07-29 DIAGNOSIS — E29.1 HYPOGONADISM MALE: Primary | ICD-10-CM

## 2019-07-29 DIAGNOSIS — L30.9 CHRONIC DERMATITIS: ICD-10-CM

## 2019-07-29 PROCEDURE — 99213 OFFICE O/P EST LOW 20 MIN: CPT | Performed by: FAMILY MEDICINE

## 2019-08-18 DIAGNOSIS — J43.2 CENTRILOBULAR EMPHYSEMA (HCC): ICD-10-CM

## 2019-08-19 RX ORDER — ALBUTEROL SULFATE 90 UG/1
AEROSOL, METERED RESPIRATORY (INHALATION)
Qty: 8.5 INHALER | Refills: 0 | Status: SHIPPED | OUTPATIENT
Start: 2019-08-19 | End: 2019-10-11 | Stop reason: SDUPTHER

## 2019-09-16 ENCOUNTER — OFFICE VISIT (OUTPATIENT)
Dept: FAMILY MEDICINE CLINIC | Facility: CLINIC | Age: 69
End: 2019-09-16

## 2019-09-16 VITALS
RESPIRATION RATE: 18 BRPM | WEIGHT: 197 LBS | BODY MASS INDEX: 29.86 KG/M2 | SYSTOLIC BLOOD PRESSURE: 100 MMHG | HEART RATE: 63 BPM | DIASTOLIC BLOOD PRESSURE: 60 MMHG | TEMPERATURE: 97.4 F | OXYGEN SATURATION: 98 % | HEIGHT: 68 IN

## 2019-09-16 DIAGNOSIS — Z12.5 SCREENING FOR PROSTATE CANCER: ICD-10-CM

## 2019-09-16 DIAGNOSIS — E11.9 TYPE 2 DIABETES MELLITUS WITHOUT COMPLICATION, WITHOUT LONG-TERM CURRENT USE OF INSULIN (HCC): ICD-10-CM

## 2019-09-16 DIAGNOSIS — E29.1 HYPOGONADISM MALE: ICD-10-CM

## 2019-09-16 DIAGNOSIS — I10 ESSENTIAL HYPERTENSION: ICD-10-CM

## 2019-09-16 DIAGNOSIS — L30.9 DERMATITIS: ICD-10-CM

## 2019-09-16 DIAGNOSIS — M43.02 CERVICAL SPONDYLOLYSIS: Primary | ICD-10-CM

## 2019-09-16 DIAGNOSIS — Z11.59 NEED FOR HEPATITIS C SCREENING TEST: ICD-10-CM

## 2019-09-16 LAB
BASOPHILS # BLD AUTO: 0.05 10*3/MM3 (ref 0–0.2)
BASOPHILS NFR BLD AUTO: 0.7 % (ref 0–1.5)
BILIRUB BLD-MCNC: NEGATIVE MG/DL
CLARITY, POC: CLEAR
COLOR UR: YELLOW
DEPRECATED RDW RBC AUTO: 56.2 FL (ref 37–54)
EOSINOPHIL # BLD AUTO: 0.09 10*3/MM3 (ref 0–0.4)
EOSINOPHIL NFR BLD AUTO: 1.3 % (ref 0.3–6.2)
ERYTHROCYTE [DISTWIDTH] IN BLOOD BY AUTOMATED COUNT: 15.3 % (ref 12.3–15.4)
GLUCOSE UR STRIP-MCNC: NEGATIVE MG/DL
HBA1C MFR BLD: 5.78 % (ref 4.8–5.6)
HCT VFR BLD AUTO: 61.6 % (ref 37.5–51)
HCV AB SER DONR QL: NORMAL
HGB BLD-MCNC: 20.2 G/DL (ref 13–17.7)
IMM GRANULOCYTES # BLD AUTO: 0.04 10*3/MM3 (ref 0–0.05)
IMM GRANULOCYTES NFR BLD AUTO: 0.6 % (ref 0–0.5)
KETONES UR QL: NEGATIVE
LEUKOCYTE EST, POC: ABNORMAL
LYMPHOCYTES # BLD AUTO: 1.43 10*3/MM3 (ref 0.7–3.1)
LYMPHOCYTES NFR BLD AUTO: 20.2 % (ref 19.6–45.3)
MCH RBC QN AUTO: 33.2 PG (ref 26.6–33)
MCHC RBC AUTO-ENTMCNC: 32.8 G/DL (ref 31.5–35.7)
MCV RBC AUTO: 101.3 FL (ref 79–97)
MONOCYTES # BLD AUTO: 0.69 10*3/MM3 (ref 0.1–0.9)
MONOCYTES NFR BLD AUTO: 9.8 % (ref 5–12)
NEUTROPHILS # BLD AUTO: 4.77 10*3/MM3 (ref 1.7–7)
NEUTROPHILS NFR BLD AUTO: 67.4 % (ref 42.7–76)
NITRITE UR-MCNC: NEGATIVE MG/ML
NRBC BLD AUTO-RTO: 0 /100 WBC (ref 0–0.2)
PH UR: 6.5 [PH] (ref 5–8)
PLATELET # BLD AUTO: 120 10*3/MM3 (ref 140–450)
PMV BLD AUTO: 12.3 FL (ref 6–12)
POC CREATININE URINE: NORMAL
POC MICROALBUMIN URINE: NORMAL
PROT UR STRIP-MCNC: NEGATIVE MG/DL
RBC # BLD AUTO: 6.08 10*6/MM3 (ref 4.14–5.8)
RBC # UR STRIP: NEGATIVE /UL
SP GR UR: 1.02 (ref 1–1.03)
UROBILINOGEN UR QL: NORMAL
WBC NRBC COR # BLD: 7.07 10*3/MM3 (ref 3.4–10.8)

## 2019-09-16 PROCEDURE — 85025 COMPLETE CBC W/AUTO DIFF WBC: CPT | Performed by: FAMILY MEDICINE

## 2019-09-16 PROCEDURE — 84403 ASSAY OF TOTAL TESTOSTERONE: CPT | Performed by: FAMILY MEDICINE

## 2019-09-16 PROCEDURE — 84402 ASSAY OF FREE TESTOSTERONE: CPT | Performed by: FAMILY MEDICINE

## 2019-09-16 PROCEDURE — 82044 UR ALBUMIN SEMIQUANTITATIVE: CPT | Performed by: FAMILY MEDICINE

## 2019-09-16 PROCEDURE — 83036 HEMOGLOBIN GLYCOSYLATED A1C: CPT | Performed by: FAMILY MEDICINE

## 2019-09-16 PROCEDURE — 81003 URINALYSIS AUTO W/O SCOPE: CPT | Performed by: FAMILY MEDICINE

## 2019-09-16 PROCEDURE — 99214 OFFICE O/P EST MOD 30 MIN: CPT | Performed by: FAMILY MEDICINE

## 2019-09-16 PROCEDURE — 86803 HEPATITIS C AB TEST: CPT | Performed by: FAMILY MEDICINE

## 2019-09-16 RX ORDER — METHOCARBAMOL 750 MG/1
TABLET, FILM COATED ORAL
COMMUNITY
End: 2019-10-23

## 2019-09-16 RX ORDER — TESTOSTERONE CYPIONATE 200 MG/ML
INJECTION, SOLUTION INTRAMUSCULAR
COMMUNITY
End: 2020-10-01

## 2019-09-16 RX ORDER — FINASTERIDE 5 MG/1
TABLET, FILM COATED ORAL
COMMUNITY
End: 2020-07-24 | Stop reason: SDUPTHER

## 2019-09-16 RX ORDER — HYDROCODONE BITARTRATE AND ACETAMINOPHEN 7.5; 325 MG/1; MG/1
TABLET ORAL
COMMUNITY
End: 2019-10-23

## 2019-09-16 RX ORDER — FLUCONAZOLE 150 MG/1
TABLET ORAL
COMMUNITY
End: 2019-10-23

## 2019-09-16 RX ORDER — ALBUTEROL SULFATE 90 UG/1
AEROSOL, METERED RESPIRATORY (INHALATION)
COMMUNITY
End: 2020-02-05 | Stop reason: SDUPTHER

## 2019-09-16 NOTE — PROGRESS NOTES
"Subjective   Rey Garrison Jr. is a 69 y.o. male seen today for Hypogonadism and Neck Pain.     History of Present Illness   The patient is here for a follow up on Hypertension,Hypogonadism, Type 2 diabetes, and COPD..    States he is doing well.  Denies any chest pain or shortness of breath.    BP today is 100/60.    States he is still having some intermittent neck pain. States he did the PT and changed pillow and has helped some. Worse with walking and with pushing a grocery cart.  Relieved with lying down and sitting with his head back.   No relief with Ibuprofen and Aleve.    Still some rash in the crease of his buttocks. Tried Fluconazole without relief.    The following portions of the patient's history were reviewed and updated as appropriate: allergies, current medications, past social history and problem list.    Review of Systems   Respiratory: Negative for shortness of breath.    Cardiovascular: Negative for chest pain.   Musculoskeletal: Positive for neck pain.   Skin: Positive for rash.       Objective   /60   Pulse 63   Temp 97.4 °F (36.3 °C)   Resp 18   Ht 172.7 cm (68\")   Wt 89.4 kg (197 lb)   SpO2 98%   BMI 29.95 kg/m²   Physical Exam   Constitutional: He appears well-developed and well-nourished.   Cardiovascular: Normal rate and regular rhythm.   Pulmonary/Chest: Effort normal and breath sounds normal.   Skin: Rash (buttocks) noted.   Nursing note and vitals reviewed.      Assessment/Plan   Problem List Items Addressed This Visit        Cardiovascular and Mediastinum    Essential hypertension       Endocrine    Type 2 diabetes mellitus (CMS/HCC)    Hypogonadism male       Musculoskeletal and Integument    Dermatitis      Other Visit Diagnoses     Cervical spondylolysis    -  Primary    Screening for prostate cancer        Need for hepatitis C screening test                  Drink plenty fluids.    Continue medications as doing.    Check a UA,Micro albumin, A1C, CBC,CMP,Lipids,PSA, " Testosterone free and total, Hep C ab, and TSH. Report results by letter.    With regards to lab results, patient is instructed to call the office if they have not received test results within 2 weeks time.    Refer to Dermatology and Orthopedics.    Follow up in 6 months for a Medicare wellness exam.            Scribed for Dr Sebastian Lovett by Christiana Rasmussen CMA.          I, Sebastian Lovett MD, personally performed the services described in this documentation, as scribed by Christiana Rasmussen in my presence, and is both accurate and complete.        (Please note that portions of this note were completed with a voice recognition program. Efforts were made to edit the dictations,but occasionally words are mis transcribed.)

## 2019-09-20 LAB
TESTOST FREE SERPL-MCNC: 19.6 PG/ML (ref 6.6–18.1)
TESTOST SERPL-MCNC: 1071 NG/DL (ref 264–916)

## 2019-10-11 DIAGNOSIS — J43.2 CENTRILOBULAR EMPHYSEMA (HCC): ICD-10-CM

## 2019-10-11 RX ORDER — ALBUTEROL SULFATE 90 UG/1
AEROSOL, METERED RESPIRATORY (INHALATION)
Qty: 8.5 INHALER | Refills: 0 | Status: SHIPPED | OUTPATIENT
Start: 2019-10-11 | End: 2019-12-15 | Stop reason: SDUPTHER

## 2019-10-14 DIAGNOSIS — N40.1 BENIGN NON-NODULAR PROSTATIC HYPERPLASIA WITH LOWER URINARY TRACT SYMPTOMS: ICD-10-CM

## 2019-10-14 RX ORDER — DOXAZOSIN MESYLATE 4 MG/1
8 TABLET ORAL EVERY MORNING
Qty: 60 TABLET | Refills: 2 | Status: SHIPPED | OUTPATIENT
Start: 2019-10-14 | End: 2020-01-17

## 2019-10-23 ENCOUNTER — OFFICE VISIT (OUTPATIENT)
Dept: FAMILY MEDICINE CLINIC | Facility: CLINIC | Age: 69
End: 2019-10-23

## 2019-10-23 VITALS
TEMPERATURE: 97.5 F | DIASTOLIC BLOOD PRESSURE: 60 MMHG | WEIGHT: 194 LBS | SYSTOLIC BLOOD PRESSURE: 100 MMHG | OXYGEN SATURATION: 97 % | HEIGHT: 68 IN | HEART RATE: 60 BPM | RESPIRATION RATE: 20 BRPM | BODY MASS INDEX: 29.4 KG/M2

## 2019-10-23 DIAGNOSIS — L82.1 SEBORRHEIC KERATOSIS: Primary | ICD-10-CM

## 2019-10-23 PROCEDURE — 99213 OFFICE O/P EST LOW 20 MIN: CPT | Performed by: FAMILY MEDICINE

## 2019-10-23 NOTE — PROGRESS NOTES
"Subjective   Rey Garrison Jr. is a 69 y.o. male seen today for Suspicious Skin Lesion.     History of Present Illness   The patient is here today with c/o a suspicious lesions on his back.    States he is not able to see the moles on his back and is wanting them checked out.  Denies any chest pain or shortness of breath.    The following portions of the patient's history were reviewed and updated as appropriate: allergies, current medications, past social history and problem list.    Review of Systems   Constitutional: Negative for chills, diaphoresis, fever and unexpected weight change.   HENT: Negative for congestion.    Eyes: Negative for visual disturbance.   Respiratory: Negative for shortness of breath.    Cardiovascular: Negative for chest pain.   Gastrointestinal: Negative for nausea.   Endocrine: Negative for polyuria.   Skin:        The previously noted intertrigo has cleared with the use of his cream.    The patient has 3 skin lesions of concern on his back.       Objective   /60   Pulse 60   Temp 97.5 °F (36.4 °C)   Resp 20   Ht 172.7 cm (68\")   Wt 88 kg (194 lb)   SpO2 97%   BMI 29.50 kg/m²   Physical Exam   Constitutional: He appears well-developed and well-nourished.   Skin:   Examination of the skin of the back reveals a small skin tag on the left side of the neck posteriorly.  At the central area of the posterior neck there is a small nevus that has a smooth contour and is not raised.  It is light brown in its pigmentation which is homogeneous.    The right subscapular area has a 2 cm seborrheic keratosis.   Nursing note and vitals reviewed.      Assessment/Plan   Problem List Items Addressed This Visit     None      Visit Diagnoses     Seborrheic keratosis    -  Primary              Drink plenty fluids.    Continue to keep a check on the moles for any changes in color or size. Refer to Dermatology as needed.    Follow up as needed.              Scribed for Dr Sebastian Lovett by " Christiana Rasmussen CMA.          I, Sebastian Lovett MD, personally performed the services described in this documentation, as scribed by Christiana Rasmussen in my presence, and is both accurate and complete.        (Please note that portions of this note were completed with a voice recognition program. Efforts were made to edit the dictations,but occasionally words are mis transcribed.)

## 2019-10-24 ENCOUNTER — TELEPHONE (OUTPATIENT)
Dept: FAMILY MEDICINE CLINIC | Facility: CLINIC | Age: 69
End: 2019-10-24

## 2019-10-24 DIAGNOSIS — E11.9 TYPE 2 DIABETES MELLITUS WITHOUT COMPLICATION, WITHOUT LONG-TERM CURRENT USE OF INSULIN (HCC): ICD-10-CM

## 2019-10-24 DIAGNOSIS — Z12.5 SCREENING FOR PROSTATE CANCER: ICD-10-CM

## 2019-10-24 DIAGNOSIS — I10 ESSENTIAL HYPERTENSION: Primary | ICD-10-CM

## 2019-10-24 NOTE — TELEPHONE ENCOUNTER
PATIENT IS CALLING IN REGARDS TO HIS PSA RESULTS HE STATES HE RECEIVED A LETTER BUT DIDN'T SEE ANYTHING ABOUT HIS PSA AND WOULD LIKE TO GET A CALL BACK -[phone] IN REGARDS TO THIS MATTER.

## 2019-10-30 ENCOUNTER — LAB (OUTPATIENT)
Dept: FAMILY MEDICINE CLINIC | Facility: CLINIC | Age: 69
End: 2019-10-30

## 2019-10-30 DIAGNOSIS — Z23 IMMUNIZATION, PNEUMOCOCCUS AND INFLUENZA: ICD-10-CM

## 2019-10-30 DIAGNOSIS — E11.9 TYPE 2 DIABETES MELLITUS WITHOUT COMPLICATION, WITHOUT LONG-TERM CURRENT USE OF INSULIN (HCC): ICD-10-CM

## 2019-10-30 DIAGNOSIS — Z12.5 SCREENING FOR PROSTATE CANCER: ICD-10-CM

## 2019-10-30 DIAGNOSIS — I10 ESSENTIAL HYPERTENSION: ICD-10-CM

## 2019-10-30 LAB
ALBUMIN SERPL-MCNC: 4.1 G/DL (ref 3.5–5.2)
ALBUMIN/GLOB SERPL: 1.3 G/DL
ALP SERPL-CCNC: 54 U/L (ref 39–117)
ALT SERPL W P-5'-P-CCNC: 35 U/L (ref 1–41)
ANION GAP SERPL CALCULATED.3IONS-SCNC: 6.1 MMOL/L (ref 5–15)
AST SERPL-CCNC: 25 U/L (ref 1–40)
BILIRUB SERPL-MCNC: 0.2 MG/DL (ref 0.2–1.2)
BUN BLD-MCNC: 13 MG/DL (ref 8–23)
BUN/CREAT SERPL: 12.6 (ref 7–25)
CALCIUM SPEC-SCNC: 9.7 MG/DL (ref 8.6–10.5)
CHLORIDE SERPL-SCNC: 100 MMOL/L (ref 98–107)
CHOLEST SERPL-MCNC: 161 MG/DL (ref 0–200)
CO2 SERPL-SCNC: 34.9 MMOL/L (ref 22–29)
CREAT BLD-MCNC: 1.03 MG/DL (ref 0.76–1.27)
GFR SERPL CREATININE-BSD FRML MDRD: 72 ML/MIN/1.73
GLOBULIN UR ELPH-MCNC: 3.1 GM/DL
GLUCOSE BLD-MCNC: 99 MG/DL (ref 65–99)
HDLC SERPL-MCNC: 48 MG/DL (ref 40–60)
LDLC SERPL CALC-MCNC: 99 MG/DL (ref 0–100)
LDLC/HDLC SERPL: 2.07 {RATIO}
POTASSIUM BLD-SCNC: 4.6 MMOL/L (ref 3.5–5.2)
PROT SERPL-MCNC: 7.2 G/DL (ref 6–8.5)
PSA SERPL-MCNC: 1.03 NG/ML (ref 0–4)
SODIUM BLD-SCNC: 141 MMOL/L (ref 136–145)
TRIGL SERPL-MCNC: 68 MG/DL (ref 0–150)
TSH SERPL DL<=0.05 MIU/L-ACNC: 2.67 UIU/ML (ref 0.27–4.2)
VLDLC SERPL-MCNC: 13.6 MG/DL (ref 5–40)

## 2019-10-30 PROCEDURE — 80053 COMPREHEN METABOLIC PANEL: CPT | Performed by: FAMILY MEDICINE

## 2019-10-30 PROCEDURE — 90653 IIV ADJUVANT VACCINE IM: CPT | Performed by: FAMILY MEDICINE

## 2019-10-30 PROCEDURE — G0103 PSA SCREENING: HCPCS | Performed by: FAMILY MEDICINE

## 2019-10-30 PROCEDURE — 80061 LIPID PANEL: CPT | Performed by: FAMILY MEDICINE

## 2019-10-30 PROCEDURE — 84443 ASSAY THYROID STIM HORMONE: CPT | Performed by: FAMILY MEDICINE

## 2019-10-30 PROCEDURE — G0008 ADMIN INFLUENZA VIRUS VAC: HCPCS | Performed by: FAMILY MEDICINE

## 2019-12-15 DIAGNOSIS — J43.2 CENTRILOBULAR EMPHYSEMA (HCC): ICD-10-CM

## 2019-12-16 RX ORDER — ALBUTEROL SULFATE 90 UG/1
AEROSOL, METERED RESPIRATORY (INHALATION)
Qty: 8.5 INHALER | Refills: 0 | Status: SHIPPED | OUTPATIENT
Start: 2019-12-16 | End: 2020-02-05 | Stop reason: SDUPTHER

## 2020-01-17 DIAGNOSIS — N40.1 BENIGN NON-NODULAR PROSTATIC HYPERPLASIA WITH LOWER URINARY TRACT SYMPTOMS: ICD-10-CM

## 2020-01-17 RX ORDER — DOXAZOSIN MESYLATE 4 MG/1
8 TABLET ORAL EVERY MORNING
Qty: 60 TABLET | Refills: 2 | Status: SHIPPED | OUTPATIENT
Start: 2020-01-17 | End: 2020-03-05

## 2020-02-05 RX ORDER — ALBUTEROL SULFATE 90 UG/1
AEROSOL, METERED RESPIRATORY (INHALATION)
Qty: 8.5 INHALER | Refills: 2 | Status: SHIPPED | OUTPATIENT
Start: 2020-02-05 | End: 2020-07-14 | Stop reason: SDUPTHER

## 2020-02-20 RX ORDER — TESTOSTERONE CYPIONATE 200 MG/ML
INJECTION, SOLUTION INTRAMUSCULAR
Qty: 3 ML | OUTPATIENT
Start: 2020-02-20

## 2020-03-05 DIAGNOSIS — N40.1 BENIGN NON-NODULAR PROSTATIC HYPERPLASIA WITH LOWER URINARY TRACT SYMPTOMS: ICD-10-CM

## 2020-03-05 RX ORDER — DOXAZOSIN MESYLATE 4 MG/1
TABLET ORAL
Qty: 60 TABLET | Refills: 1 | Status: SHIPPED | OUTPATIENT
Start: 2020-03-05 | End: 2020-03-30

## 2020-03-19 DIAGNOSIS — J44.9 CHRONIC OBSTRUCTIVE PULMONARY DISEASE, UNSPECIFIED COPD TYPE (HCC): ICD-10-CM

## 2020-03-19 RX ORDER — TIOTROPIUM BROMIDE 18 UG/1
CAPSULE ORAL; RESPIRATORY (INHALATION)
Qty: 30 CAPSULE | Refills: 3 | Status: SHIPPED | OUTPATIENT
Start: 2020-03-19 | End: 2020-08-06

## 2020-03-26 RX ORDER — TESTOSTERONE CYPIONATE 200 MG/ML
INJECTION, SOLUTION INTRAMUSCULAR
Qty: 2 ML | Refills: 1 | OUTPATIENT
Start: 2020-03-26

## 2020-03-30 DIAGNOSIS — N40.1 BENIGN NON-NODULAR PROSTATIC HYPERPLASIA WITH LOWER URINARY TRACT SYMPTOMS: ICD-10-CM

## 2020-03-30 RX ORDER — DOXAZOSIN MESYLATE 4 MG/1
TABLET ORAL
Qty: 60 TABLET | Refills: 2 | Status: SHIPPED | OUTPATIENT
Start: 2020-03-30 | End: 2020-06-22

## 2020-04-01 ENCOUNTER — TELEPHONE (OUTPATIENT)
Dept: FAMILY MEDICINE CLINIC | Facility: CLINIC | Age: 70
End: 2020-04-01

## 2020-04-01 NOTE — TELEPHONE ENCOUNTER
I spoke to the patient by telephone.  He is nearly out of his testosterone injections.  I wrote a prescription for testosterone cypionate 200 mg per 1 mL to inject 1.5 mL IM every 14 days number 3 mL with 5 refills.  I gave this to the nurse to fax to [facility] pharmacy on [address] in [facility].

## 2020-04-01 NOTE — TELEPHONE ENCOUNTER
ROMANA FROM [facility] CALLED STATED THAT THE NEED REFILLS FOR Testosterone Cypionate (DEPOTESTOTERONE CYPIONATE) 200 MG/ML injection.    PLEASE ADVISE.  CALL BACK: [phone]       [facility]/pharmacy #[id number] - [place], [place] - [address]

## 2020-04-01 NOTE — TELEPHONE ENCOUNTER
PHARMACY CALLED AND STATED THAT THEY CANNOT TAKE A PRESCRIPTION FAXED AND WANTED TO CALL AND CONFIRM THE MEDICATION OVER THE PHONE FOR     Testosterone Cypionate (DEPOTESTOTERONE CYPIONATE) 200 MG/ML injection    PHARMACY CALL BACK NUMBER [phone]

## 2020-06-22 DIAGNOSIS — N40.1 BENIGN NON-NODULAR PROSTATIC HYPERPLASIA WITH LOWER URINARY TRACT SYMPTOMS: ICD-10-CM

## 2020-06-22 RX ORDER — DOXAZOSIN MESYLATE 4 MG/1
TABLET ORAL
Qty: 180 TABLET | Refills: 0 | Status: SHIPPED | OUTPATIENT
Start: 2020-06-22 | End: 2020-10-05

## 2020-07-14 RX ORDER — ALBUTEROL SULFATE 90 UG/1
2 AEROSOL, METERED RESPIRATORY (INHALATION) EVERY 6 HOURS PRN
Qty: 8.5 INHALER | Refills: 2 | Status: SHIPPED | OUTPATIENT
Start: 2020-07-14 | End: 2020-10-03

## 2020-07-24 ENCOUNTER — OFFICE VISIT (OUTPATIENT)
Dept: FAMILY MEDICINE CLINIC | Facility: CLINIC | Age: 70
End: 2020-07-24

## 2020-07-24 VITALS
OXYGEN SATURATION: 95 % | DIASTOLIC BLOOD PRESSURE: 68 MMHG | TEMPERATURE: 97.1 F | RESPIRATION RATE: 16 BRPM | WEIGHT: 190 LBS | HEIGHT: 68 IN | BODY MASS INDEX: 28.79 KG/M2 | HEART RATE: 65 BPM | SYSTOLIC BLOOD PRESSURE: 120 MMHG

## 2020-07-24 DIAGNOSIS — Z00.00 MEDICARE ANNUAL WELLNESS VISIT, SUBSEQUENT: Primary | ICD-10-CM

## 2020-07-24 DIAGNOSIS — L30.9 DERMATITIS: ICD-10-CM

## 2020-07-24 PROCEDURE — G0439 PPPS, SUBSEQ VISIT: HCPCS | Performed by: FAMILY MEDICINE

## 2020-07-24 PROCEDURE — 99213 OFFICE O/P EST LOW 20 MIN: CPT | Performed by: FAMILY MEDICINE

## 2020-07-24 RX ORDER — FINASTERIDE 5 MG/1
5 TABLET, FILM COATED ORAL DAILY
Qty: 90 TABLET | Refills: 3 | Status: SHIPPED | OUTPATIENT
Start: 2020-07-24 | End: 2020-10-05

## 2020-07-24 RX ORDER — KETOCONAZOLE 20 MG/G
CREAM TOPICAL
Qty: 45 G | Refills: 5 | Status: SHIPPED | OUTPATIENT
Start: 2020-07-24 | End: 2022-11-01 | Stop reason: SDUPTHER

## 2020-08-06 DIAGNOSIS — J44.9 CHRONIC OBSTRUCTIVE PULMONARY DISEASE, UNSPECIFIED COPD TYPE (HCC): ICD-10-CM

## 2020-08-06 RX ORDER — TIOTROPIUM BROMIDE 18 UG/1
CAPSULE ORAL; RESPIRATORY (INHALATION)
Qty: 30 CAPSULE | Refills: 5 | Status: SHIPPED | OUTPATIENT
Start: 2020-08-06 | End: 2021-02-01

## 2020-10-01 RX ORDER — TESTOSTERONE CYPIONATE 200 MG/ML
INJECTION, SOLUTION INTRAMUSCULAR
Qty: 3 ML | Refills: 5 | Status: SHIPPED | OUTPATIENT
Start: 2020-10-01 | End: 2021-01-22 | Stop reason: SDUPTHER

## 2020-10-03 RX ORDER — ALBUTEROL SULFATE 90 UG/1
AEROSOL, METERED RESPIRATORY (INHALATION)
Qty: 8.5 G | Refills: 1 | Status: SHIPPED | OUTPATIENT
Start: 2020-10-03 | End: 2020-10-28

## 2020-10-04 DIAGNOSIS — N40.1 BENIGN NON-NODULAR PROSTATIC HYPERPLASIA WITH LOWER URINARY TRACT SYMPTOMS: ICD-10-CM

## 2020-10-05 RX ORDER — DOXAZOSIN MESYLATE 4 MG/1
TABLET ORAL
Qty: 180 TABLET | Refills: 0 | Status: SHIPPED | OUTPATIENT
Start: 2020-10-05 | End: 2020-10-07 | Stop reason: SDUPTHER

## 2020-10-07 ENCOUNTER — OFFICE VISIT (OUTPATIENT)
Dept: FAMILY MEDICINE CLINIC | Facility: CLINIC | Age: 70
End: 2020-10-07

## 2020-10-07 VITALS
OXYGEN SATURATION: 95 % | HEART RATE: 70 BPM | BODY MASS INDEX: 28.64 KG/M2 | WEIGHT: 189 LBS | TEMPERATURE: 99.5 F | HEIGHT: 68 IN | DIASTOLIC BLOOD PRESSURE: 76 MMHG | SYSTOLIC BLOOD PRESSURE: 130 MMHG | RESPIRATION RATE: 19 BRPM

## 2020-10-07 DIAGNOSIS — M54.2 NECK PAIN ON RIGHT SIDE: Primary | ICD-10-CM

## 2020-10-07 DIAGNOSIS — S46.811A STRAIN OF RIGHT TRAPEZIUS MUSCLE, INITIAL ENCOUNTER: ICD-10-CM

## 2020-10-07 DIAGNOSIS — Z23 IMMUNIZATION DUE: ICD-10-CM

## 2020-10-07 DIAGNOSIS — L82.1 SEBORRHEIC KERATOSES: ICD-10-CM

## 2020-10-07 DIAGNOSIS — N40.1 BENIGN PROSTATIC HYPERPLASIA WITH NOCTURIA: ICD-10-CM

## 2020-10-07 DIAGNOSIS — R35.1 BENIGN PROSTATIC HYPERPLASIA WITH NOCTURIA: ICD-10-CM

## 2020-10-07 DIAGNOSIS — H60.542 DERMATITIS OF EAR CANAL, LEFT: ICD-10-CM

## 2020-10-07 PROCEDURE — G0009 ADMIN PNEUMOCOCCAL VACCINE: HCPCS | Performed by: FAMILY MEDICINE

## 2020-10-07 PROCEDURE — 99214 OFFICE O/P EST MOD 30 MIN: CPT | Performed by: FAMILY MEDICINE

## 2020-10-07 PROCEDURE — G0008 ADMIN INFLUENZA VIRUS VAC: HCPCS | Performed by: FAMILY MEDICINE

## 2020-10-07 PROCEDURE — 90732 PPSV23 VACC 2 YRS+ SUBQ/IM: CPT | Performed by: FAMILY MEDICINE

## 2020-10-07 PROCEDURE — 90694 VACC AIIV4 NO PRSRV 0.5ML IM: CPT | Performed by: FAMILY MEDICINE

## 2020-10-07 RX ORDER — TIZANIDINE 4 MG/1
4 TABLET ORAL 3 TIMES DAILY PRN
Qty: 30 TABLET | Refills: 3 | Status: SHIPPED | OUTPATIENT
Start: 2020-10-07 | End: 2021-01-25 | Stop reason: SDUPTHER

## 2020-10-07 RX ORDER — METHYLPREDNISOLONE ACETATE 40 MG/ML
40 INJECTION, SUSPENSION INTRA-ARTICULAR; INTRALESIONAL; INTRAMUSCULAR; SOFT TISSUE ONCE
Status: COMPLETED | OUTPATIENT
Start: 2020-10-07 | End: 2020-10-07

## 2020-10-07 RX ORDER — AZITHROMYCIN 250 MG/1
TABLET, FILM COATED ORAL
Qty: 6 TABLET | Refills: 0 | Status: SHIPPED | OUTPATIENT
Start: 2020-10-07 | End: 2021-06-16

## 2020-10-07 RX ADMIN — METHYLPREDNISOLONE ACETATE 40 MG: 40 INJECTION, SUSPENSION INTRA-ARTICULAR; INTRALESIONAL; INTRAMUSCULAR; SOFT TISSUE at 14:01

## 2020-10-07 NOTE — PROGRESS NOTES
"Nilo Garrison Jr. is a 70 y.o. male seen today for Neck Pain, Immunizations, and referrals.     Neck Pain   This is a recurrent problem. The current episode started more than 1 year ago. The problem occurs intermittently (comes and goes). The problem has been waxing and waning (mild somedays and moderate other days.). Pain location: right posterior neck. The quality of the pain is described as aching. The pain is moderate. The symptoms are aggravated by position. Pertinent negatives include no chest pain, fever, numbness or trouble swallowing. He has tried bed rest and home exercises for the symptoms. The treatment provided moderate relief.      Also c/o lesions of the left ear and right upper back.  Has tried OTC Hydrocortisone cream without much effect.    The following portions of the patient's history were reviewed and updated as appropriate: allergies, current medications, past social history and problem list.    Review of Systems   Constitutional: Negative for fever.   HENT: Negative for trouble swallowing.    Respiratory: Negative for shortness of breath.    Cardiovascular: Negative for chest pain.   Musculoskeletal: Positive for neck pain (right sided).   Skin:        Seborrheic keratoses of the left pinna and right upper back.   Neurological: Negative for numbness.       Objective   /76   Pulse 70   Temp 99.5 °F (37.5 °C)   Resp 19   Ht 172.7 cm (68\")   Wt 85.7 kg (189 lb)   SpO2 95%   BMI 28.74 kg/m²   Physical Exam  Vitals signs and nursing note reviewed.   Constitutional:       Appearance: Normal appearance.   Skin:     Findings: Lesion (Seborrheic keratoses of left pinna and right upper back) present.   Neurological:      Mental Status: He is alert.           Assessment/Plan   Problem List Items Addressed This Visit        Genitourinary    Benign prostatic hypertrophy    Relevant Orders    Ambulatory Referral to Urology      Other Visit Diagnoses     Neck pain on right side  "   -  Primary    Strain of right trapezius muscle, initial encounter        Relevant Medications    tiZANidine (ZANAFLEX) 4 MG tablet    methylPREDNISolone acetate (DEPO-medrol) injection 40 mg (Completed)    Seborrheic keratoses        Dermatitis of ear canal, left        Relevant Medications    azithromycin (ZITHROMAX) 250 MG tablet    Immunization due        Relevant Orders    Fluad Quad >65 years (Completed)    Pneumococcal Polysaccharide Vaccine 23-Valent Greater Than or Equal To 3yo Subcutaneous / IM (Completed)        Inject Trigger Point, 1 or 2    Date/Time: 10/7/2020 12:02 PM  Performed by: Sebastian Lovett MD  Authorized by: Sebastian Lovett MD   Consent: Verbal consent obtained.  Consent given by: patient  Patient understanding: patient states understanding of the procedure being performed  Patient consent: the patient's understanding of the procedure matches consent given  Patient identity confirmed: verbally with patient  Local anesthesia used: yes    Anesthesia:  Local anesthesia used: yes  Local Anesthetic: lidocaine 1% without epinephrine  Anesthetic total: 1 mL    Sedation:  Patient sedated: no    Patient tolerance: patient tolerated the procedure well with no immediate complications  Comments: Right Trapezium injected with Depo Medrol 40 mg and 1 cc Lidocaine.            Continue stretching exercises.    Rx for Tizanidine 4 mg 3 times a day as needed.    Refer to Dr [facility] in [place].    Given a High dose flu vaccine and a Pneumococcal 23 vaccines today.    Follow up in 4 weeks.        Scribed for Dr Sebastian Lovett by Christiana Rasmussen CMA.          I, Sebastian Lovett MD, personally performed the services described in this documentation, as scribed by Christiana Rasmussen in my presence, and is both accurate and complete.        (Please note that portions of this note were completed with a voice recognition program. Efforts were made to edit the dictations,but occasionally words are mis  transcribed.)

## 2020-10-13 RX ORDER — ALBUTEROL SULFATE 90 UG/1
AEROSOL, METERED RESPIRATORY (INHALATION)
Refills: 2 | OUTPATIENT
Start: 2020-10-13

## 2020-10-28 RX ORDER — ALBUTEROL SULFATE 90 UG/1
AEROSOL, METERED RESPIRATORY (INHALATION)
Qty: 8.5 G | Refills: 2 | Status: SHIPPED | OUTPATIENT
Start: 2020-10-28 | End: 2021-01-25 | Stop reason: SDUPTHER

## 2020-10-28 RX ORDER — ALBUTEROL SULFATE 90 UG/1
2 AEROSOL, METERED RESPIRATORY (INHALATION) EVERY 6 HOURS PRN
Qty: 8.5 G | Refills: 2 | Status: SHIPPED | OUTPATIENT
Start: 2020-10-28 | End: 2021-05-14

## 2021-01-04 DIAGNOSIS — I10 ESSENTIAL HYPERTENSION: ICD-10-CM

## 2021-01-04 RX ORDER — DOXAZOSIN MESYLATE 4 MG/1
TABLET ORAL
Qty: 180 TABLET | Refills: 0 | Status: SHIPPED | OUTPATIENT
Start: 2021-01-04 | End: 2021-01-25 | Stop reason: SDUPTHER

## 2021-01-22 DIAGNOSIS — E29.1 HYPOGONADISM IN MALE: Primary | ICD-10-CM

## 2021-01-22 RX ORDER — TESTOSTERONE CYPIONATE 200 MG/ML
INJECTION, SOLUTION INTRAMUSCULAR
Qty: 3 ML | Refills: 5 | Status: SHIPPED | OUTPATIENT
Start: 2021-01-22 | End: 2021-09-04 | Stop reason: SDUPTHER

## 2021-01-22 NOTE — TELEPHONE ENCOUNTER
Caller: Rey Garrison Jr.    Relationship: Self    Best call back number: [phone]    Medication needed:   Requested Prescriptions     Pending Prescriptions Disp Refills   • Testosterone Cypionate (DEPOTESTOTERONE CYPIONATE) 200 MG/ML injection 3 mL 5       When do you need the refill by: ASAP    What details did the patient provide when requesting the medication: PATIENT STATES THAT HE HAS LESS THAN A THREE DAY SUPPLY.    Does the patient have less than a 3 day supply:  [x] Yes  [] No    What is the patient's preferred pharmacy: [facility]/PHARMACY #[id number] [address] [phone] [facility] [phone]

## 2021-01-25 DIAGNOSIS — S46.811A STRAIN OF RIGHT TRAPEZIUS MUSCLE, INITIAL ENCOUNTER: ICD-10-CM

## 2021-01-25 DIAGNOSIS — I10 ESSENTIAL HYPERTENSION: ICD-10-CM

## 2021-01-25 RX ORDER — ALBUTEROL SULFATE 90 UG/1
2 AEROSOL, METERED RESPIRATORY (INHALATION) EVERY 6 HOURS PRN
Qty: 8.5 G | Refills: 2 | Status: SHIPPED | OUTPATIENT
Start: 2021-01-25 | End: 2021-11-03 | Stop reason: SDUPTHER

## 2021-01-25 RX ORDER — TIZANIDINE 4 MG/1
4 TABLET ORAL 3 TIMES DAILY PRN
Qty: 30 TABLET | Refills: 2 | Status: SHIPPED | OUTPATIENT
Start: 2021-01-25 | End: 2021-05-24 | Stop reason: SDUPTHER

## 2021-01-25 RX ORDER — DOXAZOSIN MESYLATE 4 MG/1
8 TABLET ORAL EVERY MORNING
Qty: 180 TABLET | Refills: 2 | Status: SHIPPED | OUTPATIENT
Start: 2021-01-25 | End: 2021-10-07 | Stop reason: SDUPTHER

## 2021-02-01 DIAGNOSIS — J44.9 CHRONIC OBSTRUCTIVE PULMONARY DISEASE, UNSPECIFIED COPD TYPE (HCC): ICD-10-CM

## 2021-02-01 RX ORDER — TIOTROPIUM BROMIDE 18 UG/1
CAPSULE ORAL; RESPIRATORY (INHALATION)
Qty: 90 CAPSULE | Refills: 1 | Status: SHIPPED | OUTPATIENT
Start: 2021-02-01 | End: 2021-07-30

## 2021-02-05 ENCOUNTER — OFFICE VISIT (OUTPATIENT)
Dept: FAMILY MEDICINE CLINIC | Facility: CLINIC | Age: 71
End: 2021-02-05

## 2021-02-05 VITALS
TEMPERATURE: 97.8 F | DIASTOLIC BLOOD PRESSURE: 62 MMHG | HEART RATE: 69 BPM | BODY MASS INDEX: 30.55 KG/M2 | HEIGHT: 68 IN | SYSTOLIC BLOOD PRESSURE: 124 MMHG | WEIGHT: 201.6 LBS | OXYGEN SATURATION: 96 %

## 2021-02-05 DIAGNOSIS — S46.811D STRAIN OF RIGHT TRAPEZIUS MUSCLE, SUBSEQUENT ENCOUNTER: Primary | ICD-10-CM

## 2021-02-05 PROCEDURE — 20552 NJX 1/MLT TRIGGER POINT 1/2: CPT | Performed by: FAMILY MEDICINE

## 2021-02-05 PROCEDURE — 99213 OFFICE O/P EST LOW 20 MIN: CPT | Performed by: FAMILY MEDICINE

## 2021-02-05 RX ORDER — METHYLPREDNISOLONE ACETATE 40 MG/ML
40 INJECTION, SUSPENSION INTRA-ARTICULAR; INTRALESIONAL; INTRAMUSCULAR; SOFT TISSUE ONCE
Status: COMPLETED | OUTPATIENT
Start: 2021-02-05 | End: 2021-02-05

## 2021-02-05 RX ADMIN — METHYLPREDNISOLONE ACETATE 40 MG: 40 INJECTION, SUSPENSION INTRA-ARTICULAR; INTRALESIONAL; INTRAMUSCULAR; SOFT TISSUE at 14:41

## 2021-02-05 NOTE — PROGRESS NOTES
"Subjective   Rey Garrison Jr. is a 71 y.o. male seen today for Back Pain.     History of Present Illness   The patient is here today for a follow up on Right Trapezius muscle pain.    States he was doing very good after the steroid injection in October but the pain is starting to come back.  Will radiate up into the ride side of his neck.      The following portions of the patient's history were reviewed and updated as appropriate: allergies, current medications, past social history and problem list.    Review of Systems   Constitutional: Negative for activity change, appetite change, fatigue and fever.   HENT: Negative for congestion.    Eyes: Negative for visual disturbance.   Respiratory: Negative for cough and shortness of breath.    Cardiovascular: Negative for chest pain, palpitations and leg swelling.   Gastrointestinal: Negative for abdominal distention, diarrhea and nausea.   Endocrine: Negative for polyuria.   Genitourinary: Negative for difficulty urinating.   Musculoskeletal: Positive for back pain. Negative for arthralgias, gait problem, joint swelling and neck pain.   Neurological: Negative for headaches.       Objective   /62   Pulse 69   Temp 97.8 °F (36.6 °C)   Ht 172.7 cm (68\")   Wt 91.4 kg (201 lb 9.6 oz)   SpO2 96%   BMI 30.65 kg/m²   Physical Exam  Vitals signs and nursing note reviewed.   Constitutional:       Appearance: Normal appearance.   Musculoskeletal:      Comments: Trigger point located in the right subscapular musculature.   Neurological:      General: No focal deficit present.      Mental Status: He is alert and oriented to person, place, and time.           Assessment/Plan   Problems Addressed this Visit     None      Visit Diagnoses     Strain of right trapezius muscle, subsequent encounter    -  Primary    Relevant Medications    methylPREDNISolone acetate (DEPO-medrol) injection 40 mg (Completed)    Other Relevant Orders    Inject Trigger Point, 1 or 2    "   Diagnoses       Codes Comments    Strain of right trapezius muscle, subsequent encounter    -  Primary ICD-10-CM: S46.811D  ICD-9-CM: V58.89, 840.8         Inject Trigger Point, 1 or 2    Date/Time: 2/5/2021 11:58 AM  Performed by: Sebastian Lovett MD  Authorized by: Sebastian Lovett MD   Consent: Verbal consent obtained.  Consent given by: patient  Patient understanding: patient states understanding of the procedure being performed  Patient consent: the patient's understanding of the procedure matches consent given  Patient identity confirmed: verbally with patient  Local anesthesia used: yes    Anesthesia:  Local anesthesia used: yes  Local Anesthetic: lidocaine 1% without epinephrine  Anesthetic total: 1 mL    Sedation:  Patient sedated: no    Patient tolerance: patient tolerated the procedure well with no immediate complications  Comments: Right Trapezius injected with 1 cc depo Medrol 40 mg and 1 cc 1% Lidocaine.            Drink plenty fluids.    Continue medications as doing.    Follow up 3 months. Sooner if needed.              Scribed for Dr Sebastian Lovett by Christiana Rasmussen CMA.          I, Sebastian Lovett MD, personally performed the services described in this documentation, as scribed by Christiana Rasmussen in my presence, and is both accurate and complete.        (Please note that portions of this note were completed with a voice recognition program. Efforts were made to edit the dictations,but occasionally words are mis transcribed.)

## 2021-02-22 RX ORDER — SILDENAFIL 100 MG/1
100 TABLET, FILM COATED ORAL DAILY PRN
Qty: 15 TABLET | Refills: 5 | Status: SHIPPED | OUTPATIENT
Start: 2021-02-22 | End: 2022-10-18 | Stop reason: SDUPTHER

## 2021-05-14 RX ORDER — ALBUTEROL SULFATE 90 UG/1
AEROSOL, METERED RESPIRATORY (INHALATION)
Qty: 18 G | Refills: 0 | Status: SHIPPED | OUTPATIENT
Start: 2021-05-14 | End: 2021-06-18

## 2021-05-24 DIAGNOSIS — S46.811A STRAIN OF RIGHT TRAPEZIUS MUSCLE, INITIAL ENCOUNTER: ICD-10-CM

## 2021-05-24 RX ORDER — TIZANIDINE 4 MG/1
4 TABLET ORAL 3 TIMES DAILY PRN
Qty: 30 TABLET | Refills: 2 | Status: SHIPPED | OUTPATIENT
Start: 2021-05-24 | End: 2021-09-03 | Stop reason: SDUPTHER

## 2021-06-16 ENCOUNTER — OFFICE VISIT (OUTPATIENT)
Dept: FAMILY MEDICINE CLINIC | Facility: CLINIC | Age: 71
End: 2021-06-16

## 2021-06-16 VITALS
WEIGHT: 202 LBS | HEART RATE: 78 BPM | SYSTOLIC BLOOD PRESSURE: 110 MMHG | RESPIRATION RATE: 20 BRPM | OXYGEN SATURATION: 96 % | TEMPERATURE: 97 F | DIASTOLIC BLOOD PRESSURE: 64 MMHG | HEIGHT: 68 IN | BODY MASS INDEX: 30.62 KG/M2

## 2021-06-16 DIAGNOSIS — H61.21 IMPACTED CERUMEN OF RIGHT EAR: ICD-10-CM

## 2021-06-16 DIAGNOSIS — L30.9 DERMATITIS OF EXTERNAL EAR: Primary | ICD-10-CM

## 2021-06-16 PROCEDURE — 69209 REMOVE IMPACTED EAR WAX UNI: CPT | Performed by: NURSE PRACTITIONER

## 2021-06-16 PROCEDURE — 99213 OFFICE O/P EST LOW 20 MIN: CPT | Performed by: NURSE PRACTITIONER

## 2021-06-16 RX ORDER — FINASTERIDE 5 MG/1
5 TABLET, FILM COATED ORAL DAILY
COMMUNITY
Start: 2021-04-14 | End: 2021-07-08

## 2021-06-16 RX ORDER — TRIAMCINOLONE ACETONIDE 0.25 MG/G
OINTMENT TOPICAL 2 TIMES DAILY
Qty: 15 G | Refills: 0 | Status: SHIPPED | OUTPATIENT
Start: 2021-06-16 | End: 2022-04-21

## 2021-06-16 NOTE — PROGRESS NOTES
Follow Up Office Note     Patient Name: Rey Garrison Jr.  : 1950   MRN: [id number]     Chief Complaint:    Chief Complaint   Patient presents with   • Ear Problem     possible infection       History of Present Illness: Rey Garrison Jr. is a 71 y.o. male who presents today with c/o left ear itching, feels irritated. Also c/o decreased hearing right ear.     Ear Fullness   There is pain in the right ear. This is a new problem. The current episode started 1 to 4 weeks ago. The problem has been unchanged. There has been no fever. The patient is experiencing no pain. Pertinent negatives include no coughing, ear discharge, neck pain, sore throat or vomiting. He has tried nothing for the symptoms.        Subjective      Review of Systems:   Review of Systems   Constitutional: Negative for activity change, appetite change, chills, diaphoresis, fatigue, fever and unexpected weight change.   HENT: Negative for congestion, ear discharge, facial swelling, postnasal drip, sinus pain, sore throat, trouble swallowing and voice change.    Respiratory: Negative for cough, chest tightness, shortness of breath, wheezing and stridor.    Cardiovascular: Negative for chest pain, palpitations and leg swelling.   Gastrointestinal: Negative for vomiting.   Musculoskeletal: Negative for neck pain.   Skin:        History dermatitis        Past Medical History:   Past Medical History:   Diagnosis Date   • Benign prostatic hypertrophy    • COPD (chronic obstructive pulmonary disease) (CMS/HCC)    • Coughing up blood     History of   • Depression with anxiety    • Dermatitis    • Hearing loss    • Hiatal hernia    • History of acute pharyngitis    • History of histoplasmosis    • Hypertension    • Hypogonadism male    • Left inguinal hernia    • Mediastinal lymphadenopathy    • Sciatica of right side    • Tobacco abuse    • Type 2 diabetes mellitus (CMS/HCC)          Medications:     Current Outpatient Medications:   •   "albuterol sulfate  (90 Base) MCG/ACT inhaler, Inhale 2 puffs Every 6 (Six) Hours As Needed for Wheezing., Disp: 8.5 g, Rfl: 2  •  B-D 3CC LUER-JAVI SYR 40YA0-9/2 23G X 1-1/2\" 3 ML misc, USE WITH TESTOSTERONE, Disp: 100 each, Rfl: 0  •  doxazosin (CARDURA) 4 MG tablet, Take 2 tablets by mouth Every Morning., Disp: 180 tablet, Rfl: 2  •  finasteride (PROSCAR) 5 MG tablet, Take 5 mg by mouth Daily., Disp: , Rfl:   •  hydrocortisone 2.5 % cream, Apply  topically to the appropriate area as directed 2 (Two) Times a Day., Disp: 30 g, Rfl: 5  •  ketoconazole (NIZORAL) 2 % cream, Apply to appropriate area twice daily, Disp: 45 g, Rfl: 5  •  Multiple Vitamin (MULTI-VITAMIN DAILY PO), Take 1 tablet by mouth Daily., Disp: , Rfl:   •  sildenafil (VIAGRA) 100 MG tablet, TAKE 1 TABLET BY MOUTH DAILY AS NEEDED FOR ERECTILE DYSFUNCTION., Disp: 15 tablet, Rfl: 5  •  Spiriva HandiHaler 18 MCG per inhalation capsule, PLACE 1 CAPSULE INTO INHALER AND INHALE DAILY., Disp: 90 capsule, Rfl: 1  •  Testosterone Cypionate (DEPOTESTOTERONE CYPIONATE) 200 MG/ML injection, Inject 1.5 mL every 14 days, Disp: 3 mL, Rfl: 5  •  tiZANidine (ZANAFLEX) 4 MG tablet, TAKE 1 TABLET BY MOUTH 3 (THREE) TIMES A DAY AS NEEDED FOR MUSCLE SPASMS., Disp: 30 tablet, Rfl: 2  •  albuterol sulfate  (90 Base) MCG/ACT inhaler, INHALE 2 PUFFS BY MOUTH EVERY 6 HOURS AS NEEDED FOR WHEEZE, Disp: 18 g, Rfl: 0  •  triamcinolone (KENALOG) 0.025 % ointment, Apply  topically to the appropriate area as directed 2 (Two) Times a Day., Disp: 15 g, Rfl: 0    Allergies:   Allergies   Allergen Reactions   • Penicillamine Unknown (See Comments)     As a child    • Penicillins Itching     Causes both the hands and the feet to itch    • Sulfa Antibiotics Itching     Causes both the hands and the feet to itch          Objective     Physical Exam:  Vital Signs:   Vitals:    06/16/21 1648   BP: 110/64   Pulse: 78   Resp: 20   Temp: 97 °F (36.1 °C)   SpO2: 96%   Weight: 91.6 kg " "(202 lb)   Height: 172.7 cm (68\")   PainSc: 0-No pain     Body mass index is 30.71 kg/m².     Physical Exam  Vitals and nursing note reviewed.   Constitutional:       General: He is not in acute distress.     Appearance: Normal appearance. He is well-developed. He is not ill-appearing, toxic-appearing or diaphoretic.   HENT:      Head: Normocephalic and atraumatic.      Right Ear: Decreased hearing noted. There is impacted cerumen.      Left Ear: Tympanic membrane normal. No decreased hearing noted. Tenderness present. No drainage.      Ears:      Comments: Erythema, dry, flaky skin left external ear and ear canal.  Cardiovascular:      Rate and Rhythm: Normal rate and regular rhythm.   Pulmonary:      Effort: Pulmonary effort is normal. No respiratory distress.      Breath sounds: Normal breath sounds. No stridor. No wheezing.   Skin:     General: Skin is warm and dry.   Neurological:      General: No focal deficit present.      Mental Status: He is alert and oriented to person, place, and time.   Psychiatric:         Mood and Affect: Mood normal.         Behavior: Behavior normal. Behavior is cooperative.         Thought Content: Thought content normal.         Judgment: Judgment normal.         Assessment / Plan      Assessment/Plan:   Diagnoses and all orders for this visit:    1. Dermatitis of external ear (Primary)  -     triamcinolone (KENALOG) 0.025 % ointment; Apply  topically to the appropriate area as directed 2 (Two) Times a Day.  Dispense: 15 g; Refill: 0    2. Impacted cerumen of right ear  -     Ear Cerumen Removal       Ear Cerumen Removal    Date/Time: 6/16/2021 5:14 PM  Performed by: Michelle Velez APRN  Authorized by: Michelle Velez APRN     Anesthesia:  Local Anesthetic: none  Location details: right ear  Patient tolerance: patient tolerated the procedure well with no immediate complications  Comments: Right ear canal was irrigated with a solution of warm water and hydrogen peroxide. " Moderate amount of cerumen was expelled from ear canal(s). TM intact post-procedure.   Procedure type: irrigation   Sedation:  Patient sedated: no            Follow Up:   PRN and at next scheduled appointment(s) with PCP.    Discussed the nature of the medical condition(s) risks, complications, implications, management, safe and proper use of medications. Encouraged medication compliance, and keeping scheduled follow up appointments with me and any other providers.      RTC if symptoms fail to improve, to ER if symptoms worsen.      MANOHAR Andrews  [facility] [place]       Please note that portions of this note may have been completed with a voice recognition program. Efforts were made to edit the dictations, but occasionally words are mistranscribed.

## 2021-06-18 ENCOUNTER — TELEPHONE (OUTPATIENT)
Dept: FAMILY MEDICINE CLINIC | Facility: CLINIC | Age: 71
End: 2021-06-18

## 2021-06-18 RX ORDER — ALBUTEROL SULFATE 90 UG/1
AEROSOL, METERED RESPIRATORY (INHALATION)
Qty: 18 G | Refills: 0 | Status: SHIPPED | OUTPATIENT
Start: 2021-06-18 | End: 2021-09-08

## 2021-06-18 NOTE — TELEPHONE ENCOUNTER
Caller: Rey Garrison Jr.    Relationship: Self    Best call back number: [phone]     What medication are you requesting: TRELEGY    If a prescription is needed, what is your preferred pharmacy and phone number: [id number] - [place] - [address] [facility] - [phone]  - [phone]      Additional notes: PATIENT STATED THE SAMPLES WORKED WILL AND HE WOULD LIKE A NEW PRESCRIPTION.

## 2021-06-18 NOTE — PATIENT INSTRUCTIONS
Earwax Buildup, Adult  The ears produce a substance called earwax that helps keep bacteria out of the ear and protects the skin in the ear canal. Occasionally, earwax can build up in the ear and cause discomfort or hearing loss.  What increases the risk?  This condition is more likely to develop in people who:  · Are male.  · Are elderly.  · Naturally produce more earwax.  · Clean their ears often with cotton swabs.  · Use earplugs often.  · Use in-ear headphones often.  · Wear hearing aids.  · Have narrow ear canals.  · Have earwax that is overly thick or sticky.  · Have eczema.  · Are dehydrated.  · Have excess hair in the ear canal.  What are the signs or symptoms?  Symptoms of this condition include:  · Reduced or muffled hearing.  · A feeling of fullness in the ear or feeling that the ear is plugged.  · Fluid coming from the ear.  · Ear pain.  · Ear itch.  · Ringing in the ear.  · Coughing.  · An obvious piece of earwax that can be seen inside the ear canal.  How is this diagnosed?  This condition may be diagnosed based on:  · Your symptoms.  · Your medical history.  · An ear exam. During the exam, your health care provider will look into your ear with an instrument called an otoscope.  You may have tests, including a hearing test.  How is this treated?  This condition may be treated by:  · Using ear drops to soften the earwax.  · Having the earwax removed by a health care provider. The health care provider may:  ? Flush the ear with water.  ? Use an instrument that has a loop on the end (curette).  ? Use a suction device.  · Surgery to remove the wax buildup. This may be done in severe cases.  Follow these instructions at home:    · Take over-the-counter and prescription medicines only as told by your health care provider.  · Do not put any objects, including cotton swabs, into your ear. You can clean the opening of your ear canal with a washcloth or facial tissue.  · Follow instructions from your health care  provider about cleaning your ears. Do not over-clean your ears.  · Drink enough fluid to keep your urine clear or pale yellow. This will help to thin the earwax.  · Keep all follow-up visits as told by your health care provider. If earwax builds up in your ears often or if you use hearing aids, consider seeing your health care provider for routine, preventive ear cleanings. Ask your health care provider how often you should schedule your cleanings.  · If you have hearing aids, clean them according to instructions from the  and your health care provider.  Contact a health care provider if:  · You have ear pain.  · You develop a fever.  · You have blood, pus, or other fluid coming from your ear.  · You have hearing loss.  · You have ringing in your ears that does not go away.  · Your symptoms do not improve with treatment.  · You feel like the room is spinning (vertigo).  Summary  · Earwax can build up in the ear and cause discomfort or hearing loss.  · The most common symptoms of this condition include reduced or muffled hearing and a feeling of fullness in the ear or feeling that the ear is plugged.  · This condition may be diagnosed based on your symptoms, your medical history, and an ear exam.  · This condition may be treated by using ear drops to soften the earwax or by having the earwax removed by a health care provider.  · Do not put any objects, including cotton swabs, into your ear. You can clean the opening of your ear canal with a washcloth or facial tissue.  This information is not intended to replace advice given to you by your health care provider. Make sure you discuss any questions you have with your health care provider.  Document Revised: 11/30/2018 Document Reviewed: 02/28/2018  Elsevier Patient Education © 2021 [employer]    Atopic Dermatitis  Atopic dermatitis is a skin disorder that causes inflammation of the skin. This is the most common type of eczema. Eczema is a group of skin  conditions that cause the skin to be itchy, red, and swollen. This condition is generally worse during the cooler winter months and often improves during the warm summer months. Symptoms can vary from person to person.  Atopic dermatitis usually starts showing signs in infancy and can last through adulthood. This condition cannot be passed from one person to another (non-contagious), but it is more common in families. Atopic dermatitis may not always be present. When it is present, it is called a flare-up.  What are the causes?  The exact cause of this condition is not known. Flare-ups of the condition may be triggered by:  · Contact with something that you are sensitive or allergic to.  · Stress.  · Certain foods.  · Extremely hot or cold weather.  · Harsh chemicals and soaps.  · Dry air.  · Chlorine.  What increases the risk?  This condition is more likely to develop in people who have a personal history or family history of eczema, allergies, asthma, or hay fever.  What are the signs or symptoms?  Symptoms of this condition include:  · Dry, scaly skin.  · Red, itchy rash.  · Itchiness, which can be severe. This may occur before the skin rash. This can make sleeping difficult.  · Skin thickening and cracking that can occur over time.  How is this diagnosed?  This condition is diagnosed based on your symptoms, a medical history, and a physical exam.  How is this treated?  There is no cure for this condition, but symptoms can usually be controlled. Treatment focuses on:  · Controlling the itchiness and scratching. You may be given medicines, such as antihistamines or steroid creams.  · Limiting exposure to things that you are sensitive or allergic to (allergens).  · Recognizing situations that cause stress and developing a plan to manage stress.  If your atopic dermatitis does not get better with medicines, or if it is all over your body (widespread), a treatment using a specific type of light (phototherapy) may be  used.  Follow these instructions at home:  Skin care    · Keep your skin well-moisturized. Doing this seals in moisture and helps to prevent dryness.  ? Use unscented lotions that have petroleum in them.  ? Avoid lotions that contain alcohol or water. They can dry the skin.  · Keep baths or showers short (less than 5 minutes) in warm water. Do not use hot water.  ? Use mild, unscented cleansers for bathing. Avoid soap and bubble bath.  ? Apply a moisturizer to your skin right after a bath or shower.  · Do not apply anything to your skin without checking with your health care provider.  General instructions  · Dress in clothes made of cotton or cotton blends. Dress lightly because heat increases itchiness.  · When washing your clothes, rinse your clothes twice so all of the soap is removed.  · Avoid any triggers that can cause a flare-up.  · Try to manage your stress.  · Keep your fingernails cut short.  · Avoid scratching. Scratching makes the rash and itchiness worse. It may also result in a skin infection (impetigo) due to a break in the skin caused by scratching.  · Take or apply over-the-counter and prescription medicines only as told by your health care provider.  · Keep all follow-up visits as told by your health care provider. This is important.  · Do not be around people who have cold sores or fever blisters. If you get the infection, it may cause your atopic dermatitis to worsen.  Contact a health care provider if:  · Your itchiness interferes with sleep.  · Your rash gets worse or it is not better within one week of starting treatment.  · You have a fever.  · You have a rash flare-up after having contact with someone who has cold sores or fever blisters.  Get help right away if:  · You develop pus or soft yellow scabs in the rash area.  Summary  · This condition causes a red rash and itchy, dry, scaly skin.  · Treatment focuses on controlling the itchiness and scratching, limiting exposure to things that  you are sensitive or allergic to (allergens), recognizing situations that cause stress, and developing a plan to manage stress.  · Keep your skin well-moisturized.  · Keep baths or showers shorter than 5 minutes and use warm water. Do not use hot water.  This information is not intended to replace advice given to you by your health care provider. Make sure you discuss any questions you have with your health care provider.  Document Revised: 04/07/2020 Document Reviewed: 01/19/2018  [employer] Patient Education © 2021 [employer]  Triamcinolone skin cream, ointment, lotion, or aerosol  What is this medicine?  TRIAMCINOLONE (trye am SIN oh lone) is a corticosteroid. It is used on the skin to reduce swelling, redness, itching, and allergic reactions.  This medicine may be used for other purposes; ask your health care provider or pharmacist if you have questions.  COMMON BRAND NAME(S): Aristocort, Aristocort A, Aristocort HP, Cinalog, Cinolar, DERMASORB TA Complete, Flutex, Kenalog, Pediaderm TA, Sila III, SP Rx 228, Triacet, Trianex, Triderm  What should I tell my health care provider before I take this medicine?  They need to know if you have any of these conditions:  · any active infection  · large areas of burned or damaged skin  · skin wasting or thinning  · an unusual or allergic reaction to triamcinolone, corticosteroids, other medicines, foods, dyes, or preservatives  · pregnant or trying to get pregnant  · breast-feeding  How should I use this medicine?  This medicine is for external use only. Do not take by mouth. Follow the directions on the prescription label. Wash your hands before and after use. Apply a thin film of medicine to the affected area. Do not cover with a bandage or dressing unless your doctor or health care professional tells you to. Do not use on healthy skin or over large areas of skin. Do not get this medicine in your eyes. If you do, rinse out with plenty of cool tap water. It is important  not to use more medicine than prescribed. Do not use your medicine more often than directed.  Talk to your pediatrician regarding the use of this medicine in children. Special care may be needed.  Elderly patients are more likely to have damaged skin through aging, and this may increase side effects. This medicine should only be used for brief periods and infrequently in older patients.  Overdosage: If you think you have taken too much of this medicine contact a poison control center or emergency room at once.  NOTE: This medicine is only for you. Do not share this medicine with others.  What if I miss a dose?  If you miss a dose, use it as soon as you can. If it is almost time for your next dose, use only that dose. Do not use double or extra doses.  What may interact with this medicine?  Interactions are not expected.  This list may not describe all possible interactions. Give your health care provider a list of all the medicines, herbs, non-prescription drugs, or dietary supplements you use. Also tell them if you smoke, drink alcohol, or use illegal drugs. Some items may interact with your medicine.  What should I watch for while using this medicine?  Tell your doctor or health care professional if your symptoms do not start to get better within one week. Do not use for more than 14 days. Do not use on healthy skin or over large areas of skin. Tell your doctor or health care professional if you are exposed to anyone with measles or chickenpox, or if you develop sores or blisters that do not heal properly.  Do not use an airtight bandage to cover the affected area unless your doctor or health care professional tells you to. If you are to cover the area, follow the instructions carefully. Covering the area where the medicine is applied can increase the amount that passes through the skin and increases the risk of side effects.  If treating the diaper area of a child, avoid covering the treated area with  tight-fitting diapers or plastic pants. This may increase the amount of medicine that passes through the skin and increase the risk of serious side effects.  What side effects may I notice from receiving this medicine?  Side effects that you should report to your doctor or health care professional as soon as possible:  · burning or itching of the skin  · dark red spots on the skin  · infection  · painful, red, pus filled blisters in hair follicles  · thinning of the skin, sunburn more likely especially on the face  Side effects that usually do not require medical attention (report to your doctor or health care professional if they continue or are bothersome):  · dry skin, irritation  · unusual increased growth of hair on the face or body  This list may not describe all possible side effects. Call your doctor for medical advice about side effects. You may report side effects to FDA at 2-366-FDA-4409.  Where should I keep my medicine?  Keep out of the reach of children.  Store at room temperature between 15 and 30 degrees C (59 and 86 degrees F). Do not freeze. Throw away any unused medicine after the expiration date.  NOTE: This sheet is a summary. It may not cover all possible information. If you have questions about this medicine, talk to your doctor, pharmacist, or health care provider.  © 2021 Elsevier/[employer] (2019-09-19 10:50:30)

## 2021-06-23 DIAGNOSIS — J44.9 CHRONIC OBSTRUCTIVE PULMONARY DISEASE, UNSPECIFIED COPD TYPE (HCC): Primary | ICD-10-CM

## 2021-06-29 ENCOUNTER — TELEPHONE (OUTPATIENT)
Dept: FAMILY MEDICINE CLINIC | Facility: CLINIC | Age: 71
End: 2021-06-29

## 2021-06-29 NOTE — TELEPHONE ENCOUNTER
Caller: Rey Garrison Jr.    Relationship: Self    Best call back number:[phone] ([phone])    What test/procedure requested: TESTOTRONE MEDICATION     When is it needed: BY JULY    Where is the test/procedure going to be performed: [facility]    Additional information or concerns:  PATIENT STATES THAT HE PROVIDED THE LETTER FROM [facility] TO THE [facility] ON 6/16/21 TO INFORM THE OFFICE THAT A PRIOR AUTHORIZATION IS REQUIRED FOR HIM TO RECEIVE THE TESTOTRONE MEDICATION FOR THE MONTH OF July. PATIENT WAS CALLING TO ENSURE THAT THE INFORMATION HAS BEEN FORWARDED TO THE INSURANCE COMPANY IN A TIMELY MANNER TO AVOID ANY DELAYS IN RECEIVING THE MEDICATION.     PATIENT HAS BEEN ADVISED TO ALLOW 48 HOURS FOR THE CLINICAL TEAM TO FOLLOW UP ON THIS REQUEST,  IF SYMPTOMS WORSENS TO SEEK OUT EMERGENT CARE. PATIENT  FULLY UNDERSTANDS.

## 2021-06-30 DIAGNOSIS — E29.1 HYPOGONADISM IN MALE: Primary | ICD-10-CM

## 2021-06-30 NOTE — TELEPHONE ENCOUNTER
Caller: Rey Garrison Jr.    Relationship to patient: Self    Best call back number: [phone]    Patient is needing: PATIENT REQUESTED TO TALK TO JAMAAL ABOUT THIS ISSUE     PATIENT STATED THAT HE HAS NEVER GOT TESTED SO HE ISN'T SURE WHAT TO DO ABOUT THIS    PLEASE ADVISE

## 2021-07-08 RX ORDER — FINASTERIDE 5 MG/1
TABLET, FILM COATED ORAL
Qty: 90 TABLET | Refills: 3 | Status: SHIPPED | OUTPATIENT
Start: 2021-07-08 | End: 2022-08-03 | Stop reason: SDUPTHER

## 2021-07-28 DIAGNOSIS — J44.9 CHRONIC OBSTRUCTIVE PULMONARY DISEASE, UNSPECIFIED COPD TYPE (HCC): ICD-10-CM

## 2021-07-30 RX ORDER — TIOTROPIUM BROMIDE 18 UG/1
CAPSULE ORAL; RESPIRATORY (INHALATION)
Qty: 90 CAPSULE | Refills: 1 | Status: SHIPPED | OUTPATIENT
Start: 2021-07-30 | End: 2021-11-03

## 2021-08-05 ENCOUNTER — LAB (OUTPATIENT)
Dept: FAMILY MEDICINE CLINIC | Facility: CLINIC | Age: 71
End: 2021-08-05

## 2021-08-05 DIAGNOSIS — E29.1 HYPOGONADISM IN MALE: ICD-10-CM

## 2021-08-05 PROCEDURE — 36415 COLL VENOUS BLD VENIPUNCTURE: CPT | Performed by: NURSE PRACTITIONER

## 2021-08-05 PROCEDURE — 84402 ASSAY OF FREE TESTOSTERONE: CPT | Performed by: NURSE PRACTITIONER

## 2021-08-05 PROCEDURE — 84403 ASSAY OF TOTAL TESTOSTERONE: CPT | Performed by: NURSE PRACTITIONER

## 2021-08-11 LAB
TESTOST FREE SERPL-MCNC: 3.8 PG/ML (ref 6.6–18.1)
TESTOST SERPL-MCNC: 120 NG/DL (ref 264–916)

## 2021-08-30 DIAGNOSIS — L30.9 DERMATITIS: ICD-10-CM

## 2021-09-02 ENCOUNTER — TELEPHONE (OUTPATIENT)
Dept: FAMILY MEDICINE CLINIC | Facility: CLINIC | Age: 71
End: 2021-09-02

## 2021-09-03 ENCOUNTER — PRIOR AUTHORIZATION (OUTPATIENT)
Dept: FAMILY MEDICINE CLINIC | Facility: CLINIC | Age: 71
End: 2021-09-03

## 2021-09-03 DIAGNOSIS — E29.1 HYPOGONADISM IN MALE: ICD-10-CM

## 2021-09-03 DIAGNOSIS — S46.811A STRAIN OF RIGHT TRAPEZIUS MUSCLE, INITIAL ENCOUNTER: ICD-10-CM

## 2021-09-03 RX ORDER — TIZANIDINE 4 MG/1
4 TABLET ORAL 3 TIMES DAILY PRN
Qty: 90 TABLET | Refills: 2 | Status: SHIPPED | OUTPATIENT
Start: 2021-09-03 | End: 2021-10-05

## 2021-09-03 NOTE — TELEPHONE ENCOUNTER
PA for testosterone started 9/3/2021 [facility]   PA approved until 9/3/2022 [facility]

## 2021-09-03 NOTE — TELEPHONE ENCOUNTER
Spoke to pt, he needs the testosterone and tizanidine refilled, PA was submitted and approved for the testosterone.

## 2021-09-04 DIAGNOSIS — E29.1 HYPOGONADISM IN MALE: ICD-10-CM

## 2021-09-04 RX ORDER — TESTOSTERONE CYPIONATE 200 MG/ML
INJECTION, SOLUTION INTRAMUSCULAR
Qty: 3 ML | Refills: 5 | Status: CANCELLED | OUTPATIENT
Start: 2021-09-04

## 2021-09-04 RX ORDER — TESTOSTERONE CYPIONATE 200 MG/ML
INJECTION, SOLUTION INTRAMUSCULAR
Qty: 3 ML | Refills: 0 | Status: SHIPPED | OUTPATIENT
Start: 2021-09-04 | End: 2021-10-05

## 2021-09-08 RX ORDER — ALBUTEROL SULFATE 90 UG/1
AEROSOL, METERED RESPIRATORY (INHALATION)
Qty: 18 G | Refills: 5 | Status: SHIPPED | OUTPATIENT
Start: 2021-09-08 | End: 2021-11-03 | Stop reason: SDUPTHER

## 2021-10-03 DIAGNOSIS — S46.811A STRAIN OF RIGHT TRAPEZIUS MUSCLE, INITIAL ENCOUNTER: ICD-10-CM

## 2021-10-03 DIAGNOSIS — E29.1 HYPOGONADISM IN MALE: ICD-10-CM

## 2021-10-05 RX ORDER — TIZANIDINE 4 MG/1
4 TABLET ORAL 3 TIMES DAILY PRN
Qty: 270 TABLET | Refills: 0 | Status: SHIPPED | OUTPATIENT
Start: 2021-10-05 | End: 2022-02-08

## 2021-10-05 RX ORDER — TESTOSTERONE CYPIONATE 200 MG/ML
INJECTION, SOLUTION INTRAMUSCULAR
Qty: 3 ML | Refills: 0 | Status: SHIPPED | OUTPATIENT
Start: 2021-10-05 | End: 2021-11-03 | Stop reason: SDUPTHER

## 2021-10-07 DIAGNOSIS — I10 ESSENTIAL HYPERTENSION: ICD-10-CM

## 2021-10-07 RX ORDER — DOXAZOSIN MESYLATE 4 MG/1
8 TABLET ORAL EVERY MORNING
Qty: 180 TABLET | Refills: 2 | Status: SHIPPED | OUTPATIENT
Start: 2021-10-07 | End: 2022-04-21

## 2021-10-07 NOTE — TELEPHONE ENCOUNTER
Caller: Rey Garrison Jr.    Relationship: Self      Medication requested (name and dosage): doxazosin (CARDURA) 4 MG tablet    Pharmacy where request should be sent:   [facility]/pharmacy #[id number] - [place], [place] - [address] - [phone] [facility] [phone] FX    Additional details provided by patient: HAS ABOUT A WEEKS WORTH    Best call back number: [phone]     Does the patient have less than a 3 day supply:  [] Yes  [x] No    Remberto Lara Rep   10/07/21 09:46 EDT

## 2021-11-03 ENCOUNTER — OFFICE VISIT (OUTPATIENT)
Dept: FAMILY MEDICINE CLINIC | Facility: CLINIC | Age: 71
End: 2021-11-03

## 2021-11-03 VITALS
OXYGEN SATURATION: 94 % | HEART RATE: 86 BPM | HEIGHT: 68 IN | DIASTOLIC BLOOD PRESSURE: 70 MMHG | RESPIRATION RATE: 20 BRPM | WEIGHT: 193 LBS | SYSTOLIC BLOOD PRESSURE: 124 MMHG | BODY MASS INDEX: 29.25 KG/M2 | TEMPERATURE: 97 F

## 2021-11-03 DIAGNOSIS — Z76.0 MEDICATION REFILL: ICD-10-CM

## 2021-11-03 DIAGNOSIS — J01.00 ACUTE NON-RECURRENT MAXILLARY SINUSITIS: Primary | ICD-10-CM

## 2021-11-03 DIAGNOSIS — J44.9 CHRONIC OBSTRUCTIVE PULMONARY DISEASE, UNSPECIFIED COPD TYPE (HCC): ICD-10-CM

## 2021-11-03 DIAGNOSIS — E29.1 HYPOGONADISM IN MALE: ICD-10-CM

## 2021-11-03 PROCEDURE — 99214 OFFICE O/P EST MOD 30 MIN: CPT | Performed by: NURSE PRACTITIONER

## 2021-11-03 RX ORDER — DOXYCYCLINE 100 MG/1
100 CAPSULE ORAL 2 TIMES DAILY
Qty: 20 CAPSULE | Refills: 0 | Status: SHIPPED | OUTPATIENT
Start: 2021-11-03 | End: 2021-11-13

## 2021-11-03 RX ORDER — ALBUTEROL SULFATE 90 UG/1
2 AEROSOL, METERED RESPIRATORY (INHALATION) EVERY 6 HOURS PRN
Qty: 18 G | Refills: 5 | Status: SHIPPED | OUTPATIENT
Start: 2021-11-03 | End: 2022-04-21 | Stop reason: SDUPTHER

## 2021-11-03 RX ORDER — ALBUTEROL SULFATE 90 UG/1
2 AEROSOL, METERED RESPIRATORY (INHALATION) EVERY 6 HOURS PRN
Qty: 8.5 G | Refills: 2 | Status: SHIPPED | OUTPATIENT
Start: 2021-11-03 | End: 2022-09-27 | Stop reason: SDUPTHER

## 2021-11-03 RX ORDER — TESTOSTERONE CYPIONATE 200 MG/ML
200 INJECTION, SOLUTION INTRAMUSCULAR
Qty: 3 ML | Refills: 0 | Status: SHIPPED | OUTPATIENT
Start: 2021-11-03 | End: 2021-12-07

## 2021-11-04 ENCOUNTER — TELEPHONE (OUTPATIENT)
Dept: FAMILY MEDICINE CLINIC | Facility: CLINIC | Age: 71
End: 2021-11-04

## 2021-11-04 DIAGNOSIS — R23.8 IRRITATION SYMPTOM OF SKIN: Primary | ICD-10-CM

## 2021-11-04 NOTE — TELEPHONE ENCOUNTER
Caller: Rey Garrison Jr.    Relationship: Self    Best call back number: [phone]    What medication are you requesting: CREAM FOR HIS EAR         Have you had these symptoms before:    [x] Yes  [] No    Have you been treated for these symptoms before:   [x] Yes  [] No    If a prescription is needed, what is your preferred pharmacy and phone number: [facility]/PHARMACY #[id number] - [place], [place] - [address] [phone] [facility] [phone]      Additional notes: THE PATIENT STATES THAT HE SAW VINH HINTON 11/03/2021 AND DURING THAT APPOINTMENT THEY DISCUSSED THE CREAM FOR HIS EAR THE PATIENT CHECKED HIS PHARMACY AND THEY TOLD HIM THAT THEY DID NOT RECEIVE THE PRESCRIPTION PLEASE CALL PATIENT TO LET HIM KNOW IF THIS CAN BE SENT IN

## 2021-11-05 NOTE — ASSESSMENT & PLAN NOTE
Testosterone levels low due to missed medications. Continue current treatment regimen.  Will recheck testosterone levels after patient has been consistently taking medication for 3 months.

## 2021-11-05 NOTE — PROGRESS NOTES
Follow Up Office Note     Patient Name: Rey Garrison Jr.  : 1950   MRN: [id number]     Chief Complaint:    Chief Complaint   Patient presents with   • Cough     coughed up a little        History of Present Illness: Rey Garrison Jr. is a 71 y.o. male who presents today with c/o sinus congestion, post-nasal drainage and cough x several days. He denies fever, chills, body aches, loss of taste or smell, SOA.  Patient has received both maternal COVID-19 vaccinations.    Patient has chronic COPD which has been improving with Trelegy use. He would like to continue this medication.   Patient also has chronic hypogonadism for which he takes Depotestosterone every 2 weeks. Patient's testosterone level was low when last checked 21, however he had been out of his medication for approximately 2 months awaiting insurance prior authorization. He recently started back taking injection in September of this year. Patient is requesting refills on both his Trelegy and Depotestosterone today.    Sinusitis  This is a new problem. The current episode started in the past 7 days. The problem has been gradually worsening since onset. There has been no fever. Associated symptoms include congestion, coughing and sinus pressure. Pertinent negatives include no chills, diaphoresis, ear pain, hoarse voice, shortness of breath, sore throat or swollen glands. Past treatments include nothing.        Subjective      Review of Systems:   Review of Systems   Constitutional: Negative for activity change, appetite change, chills, diaphoresis, fatigue and fever.   HENT: Positive for congestion, postnasal drip, sinus pressure, sinus pain and voice change. Negative for ear pain, facial swelling, hoarse voice, sore throat and trouble swallowing.    Respiratory: Positive for cough. Negative for chest tightness, shortness of breath, wheezing and stridor.    Cardiovascular: Negative for chest pain, palpitations and leg swelling.  "  Gastrointestinal: Negative.    Genitourinary: Negative.    Musculoskeletal: Negative for back pain and myalgias.   Allergic/Immunologic: Positive for environmental allergies.        Past Medical History:   Past Medical History:   Diagnosis Date   • Benign prostatic hypertrophy    • COPD (chronic obstructive pulmonary disease) (HCC)    • Coughing up blood     History of   • Depression with anxiety    • Dermatitis    • Hearing loss    • Hiatal hernia    • History of acute pharyngitis    • History of histoplasmosis    • Hypertension    • Hypogonadism male    • Left inguinal hernia    • Mediastinal lymphadenopathy    • Sciatica of right side    • Tobacco abuse    • Type 2 diabetes mellitus (HCC)          Medications:     Current Outpatient Medications:   •  albuterol sulfate  (90 Base) MCG/ACT inhaler, Inhale 2 puffs Every 6 (Six) Hours As Needed for Wheezing., Disp: 8.5 g, Rfl: 2  •  albuterol sulfate  (90 Base) MCG/ACT inhaler, Inhale 2 puffs Every 6 (Six) Hours As Needed for Wheezing., Disp: 18 g, Rfl: 5  •  B-D 3CC LUER-JAVI SYR 89VX1-5/2 23G X 1-1/2\" 3 ML misc, USE WITH TESTOSTERONE, Disp: 100 each, Rfl: 0  •  doxazosin (CARDURA) 4 MG tablet, Take 2 tablets by mouth Every Morning., Disp: 180 tablet, Rfl: 2  •  finasteride (PROSCAR) 5 MG tablet, TAKE 1 TABLET BY MOUTH EVERY DAY, Disp: 90 tablet, Rfl: 3  •  Fluticasone-Umeclidin-Vilant (Trelegy Ellipta) 200-62.5-25 MCG/INH inhaler, Inhale 1 puff Daily., Disp: 60 each, Rfl: 11  •  hydrocortisone 2.5 % cream, APPLY TOPICALLY TO THE APPROPRIATE AREA AS DIRECTED 2 (TWO) TIMES A DAY., Disp: 28.35 g, Rfl: 3  •  ketoconazole (NIZORAL) 2 % cream, Apply to appropriate area twice daily, Disp: 45 g, Rfl: 5  •  Multiple Vitamin (MULTI-VITAMIN DAILY PO), Take 1 tablet by mouth Daily., Disp: , Rfl:   •  sildenafil (VIAGRA) 100 MG tablet, TAKE 1 TABLET BY MOUTH DAILY AS NEEDED FOR ERECTILE DYSFUNCTION., Disp: 15 tablet, Rfl: 5  •  Testosterone Cypionate " "(DEPOTESTOTERONE CYPIONATE) 200 MG/ML injection, Inject 1 mL into the appropriate muscle as directed by prescriber Every 14 (Fourteen) Days., Disp: 3 mL, Rfl: 0  •  tiZANidine (ZANAFLEX) 4 MG tablet, TAKE 1 TABLET BY MOUTH 3 (THREE) TIMES A DAY AS NEEDED FOR MUSCLE SPASMS., Disp: 270 tablet, Rfl: 0  •  triamcinolone (KENALOG) 0.025 % ointment, Apply  topically to the appropriate area as directed 2 (Two) Times a Day., Disp: 15 g, Rfl: 0  •  doxycycline (MONODOX) 100 MG capsule, Take 1 capsule by mouth 2 (Two) Times a Day for 10 days., Disp: 20 capsule, Rfl: 0  •  Menthol-Zinc Oxide 0.44-20.6 % ointment, Apply 1 application topically to the appropriate area as directed 2 (Two) Times a Day for 10 days., Disp: 70 g, Rfl: 0    Allergies:   Allergies   Allergen Reactions   • Penicillamine Unknown (See Comments)     As a child    • Penicillins Itching     Causes both the hands and the feet to itch    • Sulfa Antibiotics Itching     Causes both the hands and the feet to itch          Objective     Physical Exam:  Vital Signs:   Vitals:    11/03/21 0927   BP: 124/70   Pulse: 86   Resp: 20   Temp: 97 °F (36.1 °C)   SpO2: 94%   Weight: 87.5 kg (193 lb)   Height: 172.7 cm (68\")   PainSc: 0-No pain     Body mass index is 29.35 kg/m².     Physical Exam  Vitals and nursing note reviewed.   Constitutional:       General: He is not in acute distress.     Appearance: Normal appearance. He is well-developed. He is not ill-appearing, toxic-appearing or diaphoretic.   HENT:      Head: Normocephalic and atraumatic.      Right Ear: External ear normal. A middle ear effusion is present. Tympanic membrane is bulging. Tympanic membrane is not erythematous.      Left Ear: External ear normal. A middle ear effusion is present. Tympanic membrane is bulging. Tympanic membrane is not erythematous.      Nose: Mucosal edema and congestion present.      Right Turbinates: Swollen.      Left Turbinates: Swollen.      Mouth/Throat:      Lips: Pink.     "  Mouth: Mucous membranes are moist.      Pharynx: Uvula midline. Posterior oropharyngeal erythema present. Pharynx swelling: mild with cobblestoning.   Cardiovascular:      Rate and Rhythm: Normal rate and regular rhythm.      Heart sounds: Normal heart sounds.   Pulmonary:      Effort: Pulmonary effort is normal. No respiratory distress.      Breath sounds: Normal breath sounds. No stridor. No wheezing.   Musculoskeletal:      Cervical back: Normal range of motion and neck supple. No rigidity. No muscular tenderness.   Lymphadenopathy:      Cervical: No cervical adenopathy.   Skin:     General: Skin is warm and dry.   Neurological:      Mental Status: He is alert and oriented to person, place, and time.   Psychiatric:         Mood and Affect: Mood normal.         Behavior: Behavior normal. Behavior is cooperative.         Thought Content: Thought content normal.         Judgment: Judgment normal.         Assessment / Plan      Assessment/Plan:   Diagnoses and all orders for this visit:    1. Acute non-recurrent maxillary sinusitis (Primary)  -     doxycycline (MONODOX) 100 MG capsule; Take 1 capsule by mouth 2 (Two) Times a Day for 10 days.  Dispense: 20 capsule; Refill: 0    2. Hypogonadism in male  -     Testosterone Cypionate (DEPOTESTOTERONE CYPIONATE) 200 MG/ML injection; Inject 1 mL into the appropriate muscle as directed by prescriber Every 14 (Fourteen) Days.  Dispense: 3 mL; Refill: 0    3. Chronic obstructive pulmonary disease, unspecified COPD type (HCC)  Assessment & Plan:  COPD is improving with treatment.  Continue current medications.        Orders:  -     albuterol sulfate  (90 Base) MCG/ACT inhaler; Inhale 2 puffs Every 6 (Six) Hours As Needed for Wheezing.  Dispense: 8.5 g; Refill: 2  -     Fluticasone-Umeclidin-Vilant (Trelegy Ellipta) 200-62.5-25 MCG/INH inhaler; Inhale 1 puff Daily.  Dispense: 60 each; Refill: 11  -     albuterol sulfate  (90 Base) MCG/ACT inhaler; Inhale 2 puffs  Every 6 (Six) Hours As Needed for Wheezing.  Dispense: 18 g; Refill: 5    4. Medication refill  -     Testosterone Cypionate (DEPOTESTOTERONE CYPIONATE) 200 MG/ML injection; Inject 1 mL into the appropriate muscle as directed by prescriber Every 14 (Fourteen) Days.  Dispense: 3 mL; Refill: 0  -     albuterol sulfate  (90 Base) MCG/ACT inhaler; Inhale 2 puffs Every 6 (Six) Hours As Needed for Wheezing.  Dispense: 8.5 g; Refill: 2  -     Fluticasone-Umeclidin-Vilant (Trelegy Ellipta) 200-62.5-25 MCG/INH inhaler; Inhale 1 puff Daily.  Dispense: 60 each; Refill: 11         Follow Up:   PRN and at next scheduled appointment(s) with PCP.    Discussed the nature of the medical condition(s) risks, complications, implications, management, safe and proper use of medications. Encouraged medication compliance, and keeping scheduled follow up appointments with me and any other providers.      RTC if symptoms fail to improve, to ER if symptoms worsen.      MANOHAR Andrews  [facility] [place]

## 2021-11-05 NOTE — PATIENT INSTRUCTIONS
Chronic Obstructive Pulmonary Disease    Chronic obstructive pulmonary disease (COPD) is a long-term (chronic) condition that affects the lungs. COPD is a general term that can be used to describe many different lung problems that cause lung swelling (inflammation) and limit airflow, including chronic bronchitis and emphysema. If you have COPD, your lung function will probably never return to normal. In most cases, it gets worse over time. However, there are steps you can take to slow the progression of the disease and improve your quality of life.  What are the causes?  This condition may be caused by:  · Smoking. This is the most common cause.  · Certain genes passed down through families.  What increases the risk?  The following factors may make you more likely to develop this condition:  · Secondhand smoke from cigarettes, pipes, or cigars.  · Exposure to chemicals and other irritants such as fumes and dust in the work environment.  · Chronic lung conditions or infections.  What are the signs or symptoms?  Symptoms of this condition include:  · Shortness of breath, especially during physical activity.  · Chronic cough with a large amount of thick mucus. Sometimes the cough may not have any mucus (dry cough).  · Wheezing.  · Rapid breaths.  · Gray or bluish discoloration (cyanosis) of the skin, especially in your fingers, toes, or lips.  · Feeling tired (fatigue).  · Weight loss.  · Chest tightness.  · Frequent infections.  · Episodes when breathing symptoms become much worse (exacerbations).  · Swelling in the ankles, feet, or legs. This may occur in later stages of the disease.  How is this diagnosed?  This condition is diagnosed based on:  · Your medical history.  · A physical exam.  You may also have tests, including:  · Lung (pulmonary) function tests. This may include a spirometry test, which measures your ability to exhale properly.  · Chest X-ray.  · CT scan.  · Blood tests.  How is this treated?  This  condition may be treated with:  · Medicines. These may include inhaled rescue medicines to treat acute exacerbations as well as long-term, or maintenance, medicines to prevent flare-ups of COPD.  ? Bronchodilators help treat COPD by dilating the airways to allow increased airflow and make your breathing more comfortable.  ? Steroids can reduce airway inflammation and help prevent exacerbations.  · Smoking cessation. If you smoke, your health care provider may ask you to quit, and may also recommend therapy or replacement products to help you quit.  · Pulmonary rehabilitation. This may involve working with a team of health care providers and specialists, such as respiratory, occupational, and physical therapists.  · Exercise and physical activity. These are beneficial for nearly all people with COPD.  · Nutrition therapy to gain weight, if you are underweight.  · Oxygen. Supplemental oxygen therapy is only helpful if you have a low oxygen level in your blood (hypoxemia).  · Lung surgery or transplant.  · Palliative care. This is to help people with COPD feel comfortable when treatment is no longer working.  Follow these instructions at home:  Medicines  · Take over-the-counter and prescription medicines (inhaled or pills) only as told by your health care provider.  · Talk to your health care provider before taking any cough or allergy medicines. You may need to avoid certain medicines that dry out your airways.  Lifestyle  · If you are a smoker, the most important thing that you can do is to stop smoking. Do not use any products that contain nicotine or tobacco, such as cigarettes and e-cigarettes. If you need help quitting, ask your health care provider. Continuing to smoke will cause the disease to progress faster.  · Avoid exposure to things that irritate your lungs, such as smoke, chemicals, and fumes.  · Stay active, but balance activity with periods of rest. Exercise and physical activity will help you maintain  your ability to do things you want to do.  · Learn and use relaxation techniques to manage stress and to control your breathing.  · Get the right amount of sleep and get quality sleep. Most adults need 7 or more hours per night.  · Eat healthy foods. Eating smaller, more frequent meals and resting before meals may help you maintain your strength.  Controlled breathing  Learn and use controlled breathing techniques as directed by your health care provider. Controlled breathing techniques include:  · Pursed lip breathing. Start by breathing in (inhaling) through your nose for 1 second. Then, purse your lips as if you were going to whistle and breathe out (exhale) through the pursed lips for 2 seconds.  · Diaphragmatic breathing. Start by putting one hand on your abdomen just above your waist. Inhale slowly through your nose. The hand on your abdomen should move out. Then purse your lips and exhale slowly. You should be able to feel the hand on your abdomen moving in as you exhale.  Controlled coughing  Learn and use controlled coughing to clear mucus from your lungs. Controlled coughing is a series of short, progressive coughs. The steps of controlled coughing are:  1. Lean your head slightly forward.  2. Breathe in deeply using diaphragmatic breathing.  3. Try to hold your breath for 3 seconds.  4. Keep your mouth slightly open while coughing twice.  5. Spit any mucus out into a tissue.  6. Rest and repeat the steps once or twice as needed.  General instructions  · Make sure you receive all the vaccines that your health care provider recommends, especially the pneumococcal and influenza vaccines. Preventing infection and hospitalization is very important when you have COPD.  · Use oxygen therapy and pulmonary rehabilitation if directed to by your health care provider. If you require home oxygen therapy, ask your health care provider whether you should purchase a pulse oximeter to measure your oxygen level at  home.  · Work with your health care provider to develop a COPD action plan. This will help you know what steps to take if your condition gets worse.  · Keep other chronic health conditions under control as told by your health care provider.  · Avoid extreme temperature and humidity changes.  · Avoid contact with people who have an illness that spreads from person to person (is contagious), such as viral infections or pneumonia.  · Keep all follow-up visits as told by your health care provider. This is important.  Contact a health care provider if:  · You are coughing up more mucus than usual.  · There is a change in the color or thickness of your mucus.  · Your breathing is more labored than usual.  · Your breathing is faster than usual.  · You have difficulty sleeping.  · You need to use your rescue medicines or inhalers more often than expected.  · You have trouble doing routine activities such as getting dressed or walking around the house.  Get help right away if:  · You have shortness of breath while you are resting.  · You have shortness of breath that prevents you from:  ? Being able to talk.  ? Performing your usual physical activities.  · You have chest pain lasting longer than 5 minutes.  · Your skin color is more blue (cyanotic) than usual.  · You measure low oxygen saturations for longer than 5 minutes with a pulse oximeter.  · You have a fever.  · You feel too tired to breathe normally.  Summary  · Chronic obstructive pulmonary disease (COPD) is a long-term (chronic) condition that affects the lungs.  · Your lung function will probably never return to normal. In most cases, it gets worse over time. However, there are steps you can take to slow the progression of the disease and improve your quality of life.  · Treatment for COPD may include taking medicines, quitting smoking, pulmonary rehabilitation, and changes to diet and exercise. As the disease progresses, you may need oxygen therapy, a lung  transplant, or palliative care.  · To help manage your condition, do not smoke, avoid exposure to things that irritate your lungs, stay up to date on all vaccines, and follow your health care provider's instructions for taking medicines.  This information is not intended to replace advice given to you by your health care provider. Make sure you discuss any questions you have with your health care provider.  Document Revised: 11/30/2018 Document Reviewed: 01/22/2018  [employer] Patient Education © 2021 [employer]    Sinusitis, Adult  Sinusitis is inflammation of your sinuses. Sinuses are hollow spaces in the bones around your face. Your sinuses are located:  · Around your eyes.  · In the middle of your forehead.  · Behind your nose.  · In your cheekbones.  Mucus normally drains out of your sinuses. When your nasal tissues become inflamed or swollen, mucus can become trapped or blocked. This allows bacteria, viruses, and fungi to grow, which leads to infection. Most infections of the sinuses are caused by a virus.  Sinusitis can develop quickly. It can last for up to 4 weeks (acute) or for more than 12 weeks (chronic). Sinusitis often develops after a cold.  What are the causes?  This condition is caused by anything that creates swelling in the sinuses or stops mucus from draining. This includes:  · Allergies.  · Asthma.  · Infection from bacteria or viruses.  · Deformities or blockages in your nose or sinuses.  · Abnormal growths in the nose (nasal polyps).  · Pollutants, such as chemicals or irritants in the air.  · Infection from fungi (rare).  What increases the risk?  You are more likely to develop this condition if you:  · Have a weak body defense system (immune system).  · Do a lot of swimming or diving.  · Overuse nasal sprays.  · Smoke.  What are the signs or symptoms?  The main symptoms of this condition are pain and a feeling of pressure around the affected sinuses. Other symptoms include:  · Stuffy nose  or congestion.  · Thick drainage from your nose.  · Swelling and warmth over the affected sinuses.  · Headache.  · Upper toothache.  · A cough that may get worse at night.  · Extra mucus that collects in the throat or the back of the nose (postnasal drip).  · Decreased sense of smell and taste.  · Fatigue.  · A fever.  · Sore throat.  · Bad breath.  How is this diagnosed?  This condition is diagnosed based on:  · Your symptoms.  · Your medical history.  · A physical exam.  · Tests to find out if your condition is acute or chronic. This may include:  ? Checking your nose for nasal polyps.  ? Viewing your sinuses using a device that has a light (endoscope).  ? Testing for allergies or bacteria.  ? Imaging tests, such as an MRI or CT scan.  In rare cases, a bone biopsy may be done to rule out more serious types of fungal sinus disease.  How is this treated?  Treatment for sinusitis depends on the cause and whether your condition is chronic or acute.  · If caused by a virus, your symptoms should go away on their own within 10 days. You may be given medicines to relieve symptoms. They include:  ? Medicines that shrink swollen nasal passages (topical intranasal decongestants).  ? Medicines that treat allergies (antihistamines).  ? A spray that eases inflammation of the nostrils (topical intranasal corticosteroids).  ? Rinses that help get rid of thick mucus in your nose (nasal saline washes).  · If caused by bacteria, your health care provider may recommend waiting to see if your symptoms improve. Most bacterial infections will get better without antibiotic medicine. You may be given antibiotics if you have:  ? A severe infection.  ? A weak immune system.  · If caused by narrow nasal passages or nasal polyps, you may need to have surgery.  Follow these instructions at home:  Medicines  · Take, use, or apply over-the-counter and prescription medicines only as told by your health care provider. These may include nasal  sprays.  · If you were prescribed an antibiotic medicine, take it as told by your health care provider. Do not stop taking the antibiotic even if you start to feel better.  Hydrate and humidify    · Drink enough fluid to keep your urine pale yellow. Staying hydrated will help to thin your mucus.  · Use a cool mist humidifier to keep the humidity level in your home above 50%.  · Inhale steam for 10-15 minutes, 3-4 times a day, or as told by your health care provider. You can do this in the bathroom while a hot shower is running.  · Limit your exposure to cool or dry air.    Rest  · Rest as much as possible.  · Sleep with your head raised (elevated).  · Make sure you get enough sleep each night.  General instructions    · Apply a warm, moist washcloth to your face 3-4 times a day or as told by your health care provider. This will help with discomfort.  · Wash your hands often with soap and water to reduce your exposure to germs. If soap and water are not available, use hand .  · Do not smoke. Avoid being around people who are smoking (secondhand smoke).  · Keep all follow-up visits as told by your health care provider. This is important.    Contact a health care provider if:  · You have a fever.  · Your symptoms get worse.  · Your symptoms do not improve within 10 days.  Get help right away if:  · You have a severe headache.  · You have persistent vomiting.  · You have severe pain or swelling around your face or eyes.  · You have vision problems.  · You develop confusion.  · Your neck is stiff.  · You have trouble breathing.  Summary  · Sinusitis is soreness and inflammation of your sinuses. Sinuses are hollow spaces in the bones around your face.  · This condition is caused by nasal tissues that become inflamed or swollen. The swelling traps or blocks the flow of mucus. This allows bacteria, viruses, and fungi to grow, which leads to infection.  · If you were prescribed an antibiotic medicine, take it as  told by your health care provider. Do not stop taking the antibiotic even if you start to feel better.  · Keep all follow-up visits as told by your health care provider. This is important.  This information is not intended to replace advice given to you by your health care provider. Make sure you discuss any questions you have with your health care provider.  Document Revised: 05/20/2019 Document Reviewed: 05/20/2019  [employer] Patient Education © 2021 [employer]  Doxycycline tablets or capsules  What is this medicine?  DOXYCYCLINE (dox shant galeana) is a tetracycline antibiotic. It kills certain bacteria or stops their growth. It is used to treat many kinds of infections, like dental, skin, respiratory, and urinary tract infections. It also treats acne, Lyme disease, malaria, and certain sexually transmitted infections.  This medicine may be used for other purposes; ask your health care provider or pharmacist if you have questions.  COMMON BRAND NAME(S): Acticlate, Adoxa, Adoxa CK, Adoxa Tor, Adoxa TT, Alodox, Avidoxy, Doxal, LYMEPAK, Mondoxyne NL, Monodox, Morgidox 1x, Morgidox 1x Kit, Morgidox 2x, Morgidox 2x Kit, NutriDox, Ocudox, Okebo, Periostat, TARGADOX, Vibra-Tabs, Vibramycin  What should I tell my health care provider before I take this medicine?  They need to know if you have any of these conditions:  · liver disease  · long exposure to sunlight like working outdoors  · stomach problems like colitis  · an unusual or allergic reaction to doxycycline, tetracycline antibiotics, other medicines, foods, dyes, or preservatives  · pregnant or trying to get pregnant  · breast-feeding  How should I use this medicine?  Take this medicine by mouth with a full glass of water. Follow the directions on the prescription label. It is best to take this medicine without food, but if it upsets your stomach take it with food. Take your medicine at regular intervals. Do not take your medicine more often than directed. Take  all of your medicine as directed even if you think you are better. Do not skip doses or stop your medicine early.  Talk to your pediatrician regarding the use of this medicine in children. While this drug may be prescribed for selected conditions, precautions do apply.  Overdosage: If you think you have taken too much of this medicine contact a poison control center or emergency room at once.  NOTE: This medicine is only for you. Do not share this medicine with others.  What if I miss a dose?  If you miss a dose, take it as soon as you can. If it is almost time for your next dose, take only that dose. Do not take double or extra doses.  What may interact with this medicine?  · antacids  · barbiturates  · birth control pills  · bismuth subsalicylate  · carbamazepine  · methoxyflurane  · other antibiotics  · phenytoin  · vitamins that contain iron  · warfarin  This list may not describe all possible interactions. Give your health care provider a list of all the medicines, herbs, non-prescription drugs, or dietary supplements you use. Also tell them if you smoke, drink alcohol, or use illegal drugs. Some items may interact with your medicine.  What should I watch for while using this medicine?  Tell your doctor or health care professional if your symptoms do not improve.  Do not treat diarrhea with over the counter products. Contact your doctor if you have diarrhea that lasts more than 2 days or if it is severe and watery.  Do not take this medicine just before going to bed. It may not dissolve properly when you lay down and can cause pain in your throat. Drink plenty of fluids while taking this medicine to also help reduce irritation in your throat.  This medicine can make you more sensitive to the sun. Keep out of the sun. If you cannot avoid being in the sun, wear protective clothing and use sunscreen. Do not use sun lamps or tanning beds/booths.  Birth control pills may not work properly while you are taking this  medicine. Talk to your doctor about using an extra method of birth control.  If you are being treated for a sexually transmitted infection, avoid sexual contact until you have finished your treatment. Your sexual partner may also need treatment.  Avoid antacids, aluminum, calcium, magnesium, and iron products for 4 hours before and 2 hours after taking a dose of this medicine.  If you are using this medicine to prevent malaria, you should still protect yourself from contact with mosquitos. Stay in screened-in areas, use mosquito nets, keep your body covered, and use an insect repellent.  What side effects may I notice from receiving this medicine?  Side effects that you should report to your doctor or health care professional as soon as possible:  · allergic reactions like skin rash, itching or hives, swelling of the face, lips, or tongue  · difficulty breathing  · fever  · itching in the rectal or genital area  · pain on swallowing  · rash, fever, and swollen lymph nodes  · redness, blistering, peeling or loosening of the skin, including inside the mouth  · severe stomach pain or cramps  · unusual bleeding or bruising  · unusually weak or tired  · yellowing of the eyes or skin  Side effects that usually do not require medical attention (report to your doctor or health care professional if they continue or are bothersome):  · diarrhea  · loss of appetite  · nausea, vomiting  This list may not describe all possible side effects. Call your doctor for medical advice about side effects. You may report side effects to FDA at 7-874-FDA-9552.  Where should I keep my medicine?  Keep out of the reach of children.  Store at room temperature, below 30 degrees C (86 degrees F). Protect from light. Keep container tightly closed. Throw away any unused medicine after the expiration date. Taking this medicine after the expiration date can make you seriously ill.  NOTE: This sheet is a summary. It may not cover all possible  information. If you have questions about this medicine, talk to your doctor, pharmacist, or health care provider.  © 2021 [employer] (2020-03-19 13:44:53)  Testosterone injection  What is this medicine?  TESTOSTERONE (parvez TOS ter one) is the main male hormone. It supports normal male development such as muscle growth, facial hair, and deep voice. It is used in males to treat low testosterone levels.  This medicine may be used for other purposes; ask your health care provider or pharmacist if you have questions.  COMMON BRAND NAME(S): Booker-L.A., Aveed, Delatestryl, Depo-Testosterone, Virilon  What should I tell my health care provider before I take this medicine?  They need to know if you have any of these conditions:  · cancer  · diabetes  · heart disease  · kidney disease  · liver disease  · lung disease  · prostate disease  · an unusual or allergic reaction to testosterone, other medicines, foods, dyes, or preservatives  · pregnant or trying to get pregnant  · breast-feeding  How should I use this medicine?  This medicine is for injection into a muscle. It is usually given by a health care professional in a hospital or clinic setting.  Contact your pediatrician regarding the use of this medicine in children. While this medicine may be prescribed for children as young as 12 years of age for selected conditions, precautions do apply.  Overdosage: If you think you have taken too much of this medicine contact a poison control center or emergency room at once.  NOTE: This medicine is only for you. Do not share this medicine with others.  What if I miss a dose?  Try not to miss a dose. Your doctor or health care professional will tell you when your next injection is due. Notify the office if you are unable to keep an appointment.  What may interact with this medicine?  · medicines for diabetes  · medicines that treat or prevent blood clots like warfarin  · oxyphenbutazone  · propranolol  · steroid medicines  like prednisone or cortisone  This list may not describe all possible interactions. Give your health care provider a list of all the medicines, herbs, non-prescription drugs, or dietary supplements you use. Also tell them if you smoke, drink alcohol, or use illegal drugs. Some items may interact with your medicine.  What should I watch for while using this medicine?  Visit your doctor or health care professional for regular checks on your progress. They will need to check the level of testosterone in your blood.  This medicine is only approved for use in men who have low levels of testosterone related to certain medical conditions. Heart attacks and strokes have been reported with the use of this medicine. Notify your doctor or health care professional and seek emergency treatment if you develop breathing problems; changes in vision; confusion; chest pain or chest tightness; sudden arm pain; severe, sudden headache; trouble speaking or understanding; sudden numbness or weakness of the face, arm or leg; loss of balance or coordination. Talk to your doctor about the risks and benefits of this medicine.  This medicine may affect blood sugar levels. If you have diabetes, check with your doctor or health care professional before you change your diet or the dose of your diabetic medicine.  Testosterone injections are not commonly used in women. Women should inform their doctor if they wish to become pregnant or think they might be pregnant. There is a potential for serious side effects to an unborn child. Talk to your health care professional or pharmacist for more information. Talk with your doctor or health care professional about your birth control options while taking this medicine.  This drug is banned from use in athletes by most athletic organizations.  What side effects may I notice from receiving this medicine?  Side effects that you should report to your doctor or health care professional as soon as  possible:  · allergic reactions like skin rash, itching or hives, swelling of the face, lips, or tongue  · breast enlargement  · breathing problems  · changes in emotions or moods  · deep or hoarse voice  · irregular menstrual periods  · signs and symptoms of liver injury like dark yellow or brown urine; general ill feeling or flu-like symptoms; light-colored stools; loss of appetite; nausea; right upper belly pain; unusually weak or tired; yellowing of the eyes or skin  · stomach pain  · swelling of the ankles, feet, hands  · too frequent or persistent erections  · trouble passing urine or change in the amount of urine  Side effects that usually do not require medical attention (report to your doctor or health care professional if they continue or are bothersome):  · acne  · change in sex drive or performance  · facial hair growth  · hair loss  · headache  This list may not describe all possible side effects. Call your doctor for medical advice about side effects. You may report side effects to FDA at 6-048-FDA-9055.  Where should I keep my medicine?  Keep out of the reach of children. This medicine can be abused. Keep your medicine in a safe place to protect it from theft. Do not share this medicine with anyone. Selling or giving away this medicine is dangerous and against the law.  Store at room temperature between 20 and 25 degrees C (68 and 77 degrees F). Do not freeze. Protect from light. Follow the directions for the product you are prescribed. Throw away any unused medicine after the expiration date.  NOTE: This sheet is a summary. It may not cover all possible information. If you have questions about this medicine, talk to your doctor, pharmacist, or health care provider.  © 2021 Elsevier/Gold Standard (2017-01-21 07:33:55)  Fluticasone; Umeclidinium; Vilanterol inhalation powder  What is this medicine?  FLUTICASONE; UMECLIDINIUM; VILANTEROL (floo TIK a sone; ue MEK li DIN ee um; vye WAYNE ter ol) inhalation  is a combination of 3 drugs to treat COPD and asthma. Umeclidinium and Vilanterol are bronchodilators that help keep airways open. Fluticasone decreases inflammation in the lungs. Do not use this drug combination for acute asthma attacks or bronchospasm.  This medicine may be used for other purposes; ask your health care provider or pharmacist if you have questions.  COMMON BRAND NAME(S): TRELEGY ELLIPTA  What should I tell my health care provider before I take this medicine?  They need to know if you have any of these conditions:  · bone problems  · diabetes  · eye disease, vision problems  · heart disease  · high blood pressure  · history of irregular heartbeat  · immune system problems  · infection  · kidney disease  · pheochromocytoma  · prostate disease  · seizures  · thyroid disease  · trouble passing urine  · an unusual or allergic reaction to fluticasone, umeclidinium, vilanterol, lactose, milk proteins, other medicines, foods, dyes, or preservatives  · pregnant or trying to get pregnant  · breast-feeding  How should I use this medicine?  This drug is inhaled through the mouth. Rinse your mouth with water after use. Make sure not to swallow the water. Take it as directed on the prescription label at the same time every day. Do not use it more often than directed.  A special MedGuide will be given to you by the pharmacist with each prescription and refill. Be sure to read this information carefully each time.  Talk to your pediatrician about the use of this drug in children. Special care may be needed.  Overdosage: If you think you have taken too much of this medicine contact a poison control center or emergency room at once.  NOTE: This medicine is only for you. Do not share this medicine with others.  What if I miss a dose?  If you miss a dose, take it as soon as you can. If it is almost time for your next dose, take only that dose. Do not take double or extra doses.  What may interact with this  medicine?  Do not take this medicine with any of the following medications:  · cisapride  · dofetilide  · dronedarone  · MAOIs like Carbex, Eldepryl, Marplan, Nardil, and Parnate  · pimozide  · thioridazine  · ziprasidone  This medicine may also interact with the following medications:  · aclidinium  · antihistamines for allergy  · antiviral medicines for HIV or AIDS  · atropine  · beta-blockers like metoprolol and propranolol  · certain antibiotics like clarithromycin and telithromycin  · certain medicines for bladder problems like oxybutynin, tolterodine  · certain medicines for depression, anxiety, or psychotic disturbances  · certain medicines for fungal infections like ketoconazole, itraconazole, posaconazole, voriconazole  · certain medicines for Parkinson's disease like benztropine, trihexyphenidyl  · certain medicines for stomach problems like dicyclomine, hyoscyamine  · certain medicines for travel sickness like scopolamine  · conivaptan  · diuretics  · ipratropium  · medicines for colds  · other medicines for breathing problems  · other medicines that prolong the QT interval (cause an abnormal heart rhythm)  · nefazodone  · tiotropium  This list may not describe all possible interactions. Give your health care provider a list of all the medicines, herbs, non-prescription drugs, or dietary supplements you use. Also tell them if you smoke, drink alcohol, or use illegal drugs. Some items may interact with your medicine.  What should I watch for while using this medicine?  Visit your doctor or health care professional for regular checkups. Tell your doctor or health care professional if your symptoms do not get better. Do not use this medicine more than once every 24 hours.  NEVER use this medicine for an acute asthma or COPD attack. You should use your short-acting rescue inhalers for this purpose. If your symptoms get worse or if you need your short-acting inhalers more often, call your doctor right  "away.  If you are going to have surgery tell your doctor or health care professional that you are using this medicine. Try not to come in contact with people with the chicken pox or measles. If you do, call your doctor.  This medicine may increase blood sugar. Ask your healthcare provider if changes in diet or medicines are needed if you have diabetes.  What side effects may I notice from receiving this medicine?  Side effects that you should report to your doctor or health care professional as soon as possible:  · allergic reactions like skin rash or hives, swelling of the face, lips, or tongue  · breathing problems right after inhaling your medicine  · chest pain  · eye pain  · fast, irregular heartbeat  · feeling faint or lightheaded, falls  · fever or chills  · nausea, vomiting  · signs and symptoms of high blood sugar such as being more thirsty or hungry or having to urinate more than normal. You may also feel very tired or have blurry vision.  · trouble passing urine  Side effects that usually do not require medical attention (report these to your doctor or health care professional if they continue or are bothersome):  · back pain  · changes in taste  · cough  · diarrhea  · headache  · nervousness  · sore throat  · tremor  This list may not describe all possible side effects. Call your doctor for medical advice about side effects. You may report side effects to FDA at 0-480-FDA-0284.  Where should I keep my medicine?  Keep out of the reach of children and pets.  Store at room temperature between 20 and 25 degrees C (68 and 77 degrees F). Keep inhaler away from extreme heat, cold or humidity. Throw away 6 weeks after removing it from the foil pouch, when the dose counter reads \"0\" or after the expiration date, whichever is first.  NOTE: This sheet is a summary. It may not cover all possible information. If you have questions about this medicine, talk to your doctor, pharmacist, or health care provider.  © 2021 " Elsevier/Gold Standard (2020-10-26 12:45:04)  Albuterol inhalation aerosol  What is this medicine?  ALBUTEROL (al BYOO ter ole) is a bronchodilator. It treats bronchospasm. Bronchospasm is when you have trouble breathing and make loud or whistling sounds when you breathe. This drug opens the airways in the lungs so it is easier to breathe.  This medicine may be used for other purposes; ask your health care provider or pharmacist if you have questions.  COMMON BRAND NAME(S): Proair HFA, Proventil, Proventil HFA, Respirol, Ventolin, Ventolin HFA  What should I tell my health care provider before I take this medicine?  They need to know if you have any of the following conditions:  · diabetes (high blood sugar)  · heart disease  · high blood pressure  · irregular heartbeat or rhythm  · pheochromocytoma  · seizures  · thyroid disease  · an unusual or allergic reaction to albuterol, levalbuterol, other medicines, foods, dyes, or preservatives  · pregnant or trying to get pregnant  · breast-feeding  How should I use this medicine?  This medicine is for inhalation through the mouth. Follow the directions on your prescription label. Take your medicine at regular intervals. Do not use more often than directed. Make sure that you are using your inhaler correctly. Ask your doctor or health care provider if you have any questions.  Talk to your pediatrician regarding the use of this medicine in children. While this drug may be prescribed for children as young as 4 years for selected conditions, precautions do apply.  Overdosage: If you think you have taken too much of this medicine contact a poison control center or emergency room at once.  NOTE: This medicine is only for you. Do not share this medicine with others.  What if I miss a dose?  If you miss a dose, use it as soon as you can. If it is almost time for your next dose, use only that dose. Do not use double or extra doses.  What may interact with this  medicine?  · anti-infectives like chloroquine and pentamidine  · caffeine  · cisapride  · diuretics  · medicines for colds  · medicines for depression or for emotional or psychotic conditions  · medicines for weight loss including some herbal products  · methadone  · some antibiotics like clarithromycin, erythromycin, levofloxacin, and linezolid  · some heart medicines  · steroid hormones like dexamethasone, cortisone, hydrocortisone  · theophylline  · thyroid hormones  This list may not describe all possible interactions. Give your health care provider a list of all the medicines, herbs, non-prescription drugs, or dietary supplements you use. Also tell them if you smoke, drink alcohol, or use illegal drugs. Some items may interact with your medicine.  What should I watch for while using this medicine?  Visit your health care provider for regular checks on your progress. Tell your health care provider if your symptoms do not start to get better or if they get worse.  If your symptoms get worse or if you are using this drug more than normal, call your health care provider right away.  Do not treat yourself for coughs, colds or allergies without asking your health care provider for advice. Some nonprescription medicines can affect this one.  You and your health care provider should develop an Asthma Action Plan that is just for you. Be sure to know what to do if you are in the yellow (asthma is getting worse) or red (medical alert) zones.  Your mouth may get dry. Chewing sugarless gum or sucking hard candy and drinking plenty of water may help. Contact your health care provider if the problem does not go away or is severe.  What side effects may I notice from receiving this medicine?  Side effects that you should report to your doctor or health care professional as soon as possible:  · allergic reactions (skin rash, itching or hives; swelling of the face, lips, or tongue)  · fever  · heartbeat rhythm changes (trouble  "breathing; chest pain; dizziness; fast, irregular heartbeat; feeling faint or lightheaded, falls)  · increase in blood pressure  · muscle cramps, pain  · muscle weakness  · pain, tingling, numbness in the hands or feet  · vomiting  Side effects that usually do not require medical attention (report to your doctor or health care professional if they continue or are bothersome):  · change in taste  · cough  · dry mouth  · headache  · nasal congestion (runny or stuffy nose)  · sore throat  · tremors  · trouble sleeping  · upset stomach  This list may not describe all possible side effects. Call your doctor for medical advice about side effects. You may report side effects to FDA at 4-790-BXA-1477.  Where should I keep my medicine?  Keep out of the reach of children.  Store Proventil HFA and ProAir HFA at room temperature between 15 and 25 degrees C (59 and 77 degrees F). Store Ventolin HFA at room temperature between 20 and 25 degrees C (68 and 77 degrees F); it may be stored between 15 and 30 degrees C (59 and 86 degrees F) on occasion. The contents are under pressure and may burst when exposed to heat or flame. Do not freeze. This medicine does not work as well if it is too cold. Throw away the inhaler when the dose counter displays \"0\" or after the expiration date on the package, whichever comes first. Ventolin HFA should be thrown away 12 months after removing it from the foil pouch.  NOTE: This sheet is a summary. It may not cover all possible information. If you have questions about this medicine, talk to your doctor, pharmacist, or health care provider.  © 2021 Elsevier/Gold Standard (2020-12-18 12:38:45)    "

## 2021-12-07 DIAGNOSIS — Z76.0 MEDICATION REFILL: ICD-10-CM

## 2021-12-07 DIAGNOSIS — E29.1 HYPOGONADISM IN MALE: ICD-10-CM

## 2021-12-07 RX ORDER — TESTOSTERONE CYPIONATE 200 MG/ML
INJECTION, SOLUTION INTRAMUSCULAR
Qty: 2 ML | Refills: 1 | Status: SHIPPED | OUTPATIENT
Start: 2021-12-07 | End: 2022-01-26 | Stop reason: SDUPTHER

## 2021-12-07 NOTE — TELEPHONE ENCOUNTER
Last visit 11/03/21  Next visit none  Labs 10/30/2019    Testosterone Cypionate (DEPOTESTOTERONE CYPIONATE) 200 MG/ML injection was last filled on

## 2022-01-26 DIAGNOSIS — Z76.0 MEDICATION REFILL: ICD-10-CM

## 2022-01-26 DIAGNOSIS — E29.1 HYPOGONADISM IN MALE: ICD-10-CM

## 2022-01-26 RX ORDER — TESTOSTERONE CYPIONATE 200 MG/ML
INJECTION, SOLUTION INTRAMUSCULAR
Qty: 2 ML | Refills: 1 | Status: SHIPPED | OUTPATIENT
Start: 2022-01-26 | End: 2022-04-21 | Stop reason: SDUPTHER

## 2022-01-31 ENCOUNTER — TELEPHONE (OUTPATIENT)
Dept: FAMILY MEDICINE CLINIC | Facility: CLINIC | Age: 72
End: 2022-01-31

## 2022-02-02 DIAGNOSIS — E29.1 HYPOGONADISM IN MALE: ICD-10-CM

## 2022-02-03 ENCOUNTER — TELEPHONE (OUTPATIENT)
Dept: FAMILY MEDICINE CLINIC | Facility: CLINIC | Age: 72
End: 2022-02-03

## 2022-02-03 NOTE — TELEPHONE ENCOUNTER
[facility] in [place], [place] is requesting rf for  Spiriva 18mcg inhailer. I did not see this on his med list.

## 2022-02-08 DIAGNOSIS — S46.811A STRAIN OF RIGHT TRAPEZIUS MUSCLE, INITIAL ENCOUNTER: ICD-10-CM

## 2022-02-08 RX ORDER — TIZANIDINE 4 MG/1
4 TABLET ORAL 3 TIMES DAILY PRN
Qty: 270 TABLET | Refills: 0 | Status: SHIPPED | OUTPATIENT
Start: 2022-02-08 | End: 2022-04-21

## 2022-02-18 ENCOUNTER — APPOINTMENT (OUTPATIENT)
Dept: GENERAL RADIOLOGY | Facility: HOSPITAL | Age: 72
End: 2022-02-18

## 2022-02-18 ENCOUNTER — APPOINTMENT (OUTPATIENT)
Dept: CT IMAGING | Facility: HOSPITAL | Age: 72
End: 2022-02-18

## 2022-02-18 ENCOUNTER — HOSPITAL ENCOUNTER (EMERGENCY)
Facility: HOSPITAL | Age: 72
Discharge: HOME | End: 2022-02-18
Attending: EMERGENCY MEDICINE | Admitting: EMERGENCY MEDICINE

## 2022-02-18 VITALS
OXYGEN SATURATION: 94 % | WEIGHT: 190.6 LBS | TEMPERATURE: 97.7 F | RESPIRATION RATE: 18 BRPM | SYSTOLIC BLOOD PRESSURE: 133 MMHG | HEIGHT: 68 IN | BODY MASS INDEX: 28.89 KG/M2 | DIASTOLIC BLOOD PRESSURE: 106 MMHG | HEART RATE: 85 BPM

## 2022-02-18 DIAGNOSIS — Z72.0 TOBACCO USE: ICD-10-CM

## 2022-02-18 DIAGNOSIS — R04.2 HEMOPTYSIS: Primary | ICD-10-CM

## 2022-02-18 DIAGNOSIS — R91.8 ABNORMAL CT SCAN OF LUNG: ICD-10-CM

## 2022-02-18 LAB
ALBUMIN SERPL-MCNC: 4.5 G/DL (ref 3.5–5.2)
ALBUMIN/GLOB SERPL: 1.4 G/DL
ALP SERPL-CCNC: 93 U/L (ref 39–117)
ALT SERPL W P-5'-P-CCNC: 19 U/L (ref 1–41)
ANION GAP SERPL CALCULATED.3IONS-SCNC: 12 MMOL/L (ref 5–15)
AST SERPL-CCNC: 18 U/L (ref 1–40)
BASOPHILS # BLD AUTO: 0.06 10*3/MM3 (ref 0–0.2)
BASOPHILS NFR BLD AUTO: 0.6 % (ref 0–1.5)
BILIRUB SERPL-MCNC: 0.9 MG/DL (ref 0–1.2)
BUN SERPL-MCNC: 22 MG/DL (ref 8–23)
BUN/CREAT SERPL: 25.3 (ref 7–25)
CALCIUM SPEC-SCNC: 10 MG/DL (ref 8.6–10.5)
CHLORIDE SERPL-SCNC: 99 MMOL/L (ref 98–107)
CO2 SERPL-SCNC: 26 MMOL/L (ref 22–29)
CREAT SERPL-MCNC: 0.87 MG/DL (ref 0.76–1.27)
D DIMER PPP FEU-MCNC: 1 MCGFEU/ML (ref 0–0.57)
DEPRECATED RDW RBC AUTO: 47.7 FL (ref 37–54)
EOSINOPHIL # BLD AUTO: 0.09 10*3/MM3 (ref 0–0.4)
EOSINOPHIL NFR BLD AUTO: 0.9 % (ref 0.3–6.2)
ERYTHROCYTE [DISTWIDTH] IN BLOOD BY AUTOMATED COUNT: 14.4 % (ref 12.3–15.4)
GFR SERPL CREATININE-BSD FRML MDRD: 87 ML/MIN/1.73
GLOBULIN UR ELPH-MCNC: 3.3 GM/DL
GLUCOSE SERPL-MCNC: 118 MG/DL (ref 65–99)
HCT VFR BLD AUTO: 53.5 % (ref 37.5–51)
HGB BLD-MCNC: 19.1 G/DL (ref 13–17.7)
HOLD SPECIMEN: NORMAL
HOLD SPECIMEN: NORMAL
IMM GRANULOCYTES # BLD AUTO: 0.05 10*3/MM3 (ref 0–0.05)
IMM GRANULOCYTES NFR BLD AUTO: 0.5 % (ref 0–0.5)
LYMPHOCYTES # BLD AUTO: 2.1 10*3/MM3 (ref 0.7–3.1)
LYMPHOCYTES NFR BLD AUTO: 20.7 % (ref 19.6–45.3)
MCH RBC QN AUTO: 32.3 PG (ref 26.6–33)
MCHC RBC AUTO-ENTMCNC: 35.7 G/DL (ref 31.5–35.7)
MCV RBC AUTO: 90.4 FL (ref 79–97)
MONOCYTES # BLD AUTO: 0.71 10*3/MM3 (ref 0.1–0.9)
MONOCYTES NFR BLD AUTO: 7 % (ref 5–12)
NEUTROPHILS NFR BLD AUTO: 7.13 10*3/MM3 (ref 1.7–7)
NEUTROPHILS NFR BLD AUTO: 70.3 % (ref 42.7–76)
NRBC BLD AUTO-RTO: 0 /100 WBC (ref 0–0.2)
NT-PROBNP SERPL-MCNC: 30.7 PG/ML (ref 0–900)
PLATELET # BLD AUTO: 150 10*3/MM3 (ref 140–450)
PMV BLD AUTO: 9.6 FL (ref 6–12)
POTASSIUM SERPL-SCNC: 4.2 MMOL/L (ref 3.5–5.2)
PROT SERPL-MCNC: 7.8 G/DL (ref 6–8.5)
RBC # BLD AUTO: 5.92 10*6/MM3 (ref 4.14–5.8)
SARS-COV-2 RNA PNL SPEC NAA+PROBE: NOT DETECTED
SODIUM SERPL-SCNC: 137 MMOL/L (ref 136–145)
TROPONIN T SERPL-MCNC: <0.01 NG/ML (ref 0–0.03)
WBC NRBC COR # BLD: 10.14 10*3/MM3 (ref 3.4–10.8)
WHOLE BLOOD HOLD SPECIMEN: NORMAL
WHOLE BLOOD HOLD SPECIMEN: NORMAL

## 2022-02-18 PROCEDURE — 87635 SARS-COV-2 COVID-19 AMP PRB: CPT | Performed by: PHYSICIAN ASSISTANT

## 2022-02-18 PROCEDURE — 71275 CT ANGIOGRAPHY CHEST: CPT

## 2022-02-18 PROCEDURE — 25010000002 IOPAMIDOL 61 % SOLUTION: Performed by: EMERGENCY MEDICINE

## 2022-02-18 PROCEDURE — 84484 ASSAY OF TROPONIN QUANT: CPT | Performed by: PHYSICIAN ASSISTANT

## 2022-02-18 PROCEDURE — 96374 THER/PROPH/DIAG INJ IV PUSH: CPT

## 2022-02-18 PROCEDURE — 85025 COMPLETE CBC W/AUTO DIFF WBC: CPT | Performed by: PHYSICIAN ASSISTANT

## 2022-02-18 PROCEDURE — 83880 ASSAY OF NATRIURETIC PEPTIDE: CPT | Performed by: PHYSICIAN ASSISTANT

## 2022-02-18 PROCEDURE — 85379 FIBRIN DEGRADATION QUANT: CPT | Performed by: PHYSICIAN ASSISTANT

## 2022-02-18 PROCEDURE — 80053 COMPREHEN METABOLIC PANEL: CPT | Performed by: PHYSICIAN ASSISTANT

## 2022-02-18 PROCEDURE — 99283 EMERGENCY DEPT VISIT LOW MDM: CPT

## 2022-02-18 PROCEDURE — 25010000002 DEXAMETHASONE SODIUM PHOSPHATE 10 MG/ML SOLUTION: Performed by: PHYSICIAN ASSISTANT

## 2022-02-18 PROCEDURE — 93005 ELECTROCARDIOGRAM TRACING: CPT | Performed by: EMERGENCY MEDICINE

## 2022-02-18 PROCEDURE — 71045 X-RAY EXAM CHEST 1 VIEW: CPT

## 2022-02-18 RX ORDER — DEXAMETHASONE SODIUM PHOSPHATE 10 MG/ML
10 INJECTION, SOLUTION INTRAMUSCULAR; INTRAVENOUS ONCE
Status: COMPLETED | OUTPATIENT
Start: 2022-02-18 | End: 2022-02-18

## 2022-02-18 RX ORDER — SODIUM CHLORIDE 0.9 % (FLUSH) 0.9 %
10 SYRINGE (ML) INJECTION AS NEEDED
Status: DISCONTINUED | OUTPATIENT
Start: 2022-02-18 | End: 2022-02-18 | Stop reason: HOSPADM

## 2022-02-18 RX ADMIN — IOPAMIDOL 100 ML: 612 INJECTION, SOLUTION INTRAVENOUS at 14:51

## 2022-02-18 RX ADMIN — DEXAMETHASONE SODIUM PHOSPHATE 10 MG: 10 INJECTION INTRAMUSCULAR; INTRAVENOUS at 12:07

## 2022-02-18 NOTE — ED NOTES
Pt received discharge instructions and verbalized understanding; breathing even and non labored with no signs of distress; AOx4; GCS 15; Pt ambulated off unit with steady gait with spouse/ride.      Mery Denton, RN  02/18/22 6870

## 2022-02-18 NOTE — ED PROVIDER NOTES
Subjective   Patient is a 72-year-old male with history of BPH, COPD, hemoptysis, depression, hearing loss, hiatal hernia, hypertension, tobacco use and type 2 diabetes presenting to the ER for evaluation of cough, hemoptysis and shortness of breath.  Patient states last week he was noting some blood-tinged sputum with coughing.  He states that this seemed to become more frequent this week.  He states he is not necessarily short of breath, but does have COPD and does smoke tobacco.  Denies any specific chest pain.  Denies any anticoagulation.  States he does use a trilogy and inhaler at home.  Denies any fever, chills, dizziness, syncope, abdominal pain, nausea, emesis, diarrhea, leg pain or swelling, or any other symptoms.          Review of Systems   Constitutional: Negative for chills and fever.   HENT: Negative.    Eyes: Negative.    Respiratory: Positive for cough, shortness of breath and wheezing.    Cardiovascular: Negative.    Gastrointestinal: Negative.    Genitourinary: Negative.    Musculoskeletal: Negative.    Skin: Negative.    Allergic/Immunologic: Negative for immunocompromised state.   Neurological: Negative.    Psychiatric/Behavioral: Negative.        Past Medical History:   Diagnosis Date   • Benign prostatic hypertrophy    • COPD (chronic obstructive pulmonary disease) (HCC)    • Coughing up blood     History of   • Depression with anxiety    • Dermatitis    • Hearing loss    • Hiatal hernia    • History of acute pharyngitis    • History of histoplasmosis    • Hypertension    • Hypogonadism male    • Left inguinal hernia    • Mediastinal lymphadenopathy    • Sciatica of right side    • Tobacco abuse    • Type 2 diabetes mellitus (HCC)        Allergies   Allergen Reactions   • Penicillamine Unknown (See Comments)     As a child    • Penicillins Itching     Causes both the hands and the feet to itch    • Sulfa Antibiotics Itching     Causes both the hands and the feet to itch        Past Surgical  "History:   Procedure Laterality Date   • ANTERIOR CERVICAL DISCECTOMY W/ FUSION Bilateral 10/19/2018    Procedure: CERVICAL DISCECTOMY ANTERIOR WITH FUSION C5-7;  Surgeon: Ata Stoddard MD;  Location: [facility];  Service: Neurosurgery   • COLONOSCOPY W/ POLYPECTOMY      Pathology consistent with hyperplastic polyp   • HERNIA REPAIR     • INGUINAL HERNIA REPAIR  2014   • KNEE ARTHROSCOPY W/ MENISCAL REPAIR      Medial and lateral meniscus repair   • NASAL POLYP EXCISION         Family History   Problem Relation Age of Onset   • Dementia Mother    • Prostate cancer Father        Social History     Socioeconomic History   • Marital status:    Tobacco Use   • Smoking status: Current Every Day Smoker     Packs/day: 0.50     Years: 40.00     Pack years: 20.00     Types: Cigarettes   • Smokeless tobacco: Never Used   Vaping Use   • Vaping Use: Never used   Substance and Sexual Activity   • Alcohol use: Yes     Alcohol/week: 5.0 standard drinks     Types: 5 Standard drinks or equivalent per week     Comment: 5 alcoholic beverages per week   • Drug use: Yes     Types: Marijuana     Comment: social use; last use 10-17-18   • Sexual activity: Yes     Partners: Male           Objective   Physical Exam  Vitals and nursing note reviewed.     BP (!) 133/106   Pulse 85   Temp 97.7 °F (36.5 °C) (Oral)   Resp 18   Ht 172.7 cm (68\")   Wt 86.5 kg (190 lb 9.6 oz)   SpO2 94%   BMI 28.98 kg/m²     GEN: No acute distress, sitting up on the stretcher.  Awake and alert.  Does not appear septic or toxic.  He is answering questions appropriately.  Head: Normocephalic, atraumatic  Eyes: EOM intact  ENT: Posterior pharynx normal in appearance, oral mucosa is moist, tongue midline  Chest: Nontender to palpation  Cardiovascular: Regular rate and rhythm  Lungs: Breathing even and unlabored.  Lung sounds are diminished bilaterally, there are some diffuse wheezes noted  Abdomen: Soft, nontender, nondistended, no peritoneal " signs, no guarding  Extremities: No edema, normal appearance, full range of motion, no calf tenderness, distal pulses intact  Neuro: GCS 15  Psych: Mood and affect are appropriate    Procedures           ED Course  ED Course as of 02/18/22 1928 Fri Feb 18, 2022   1203 EKG interpreted by me reveals sinus rhythm with rate of 94 bpm.  There is locators voltage in limb leads.  No obvious signs of ischemia.  This is an atypical appearing EKG. [TB]   1214 Glucose(!): 118 [LA]   1214 BUN: 22 [LA]   1214 Creatinine: 0.87 [LA]   1214 Sodium: 137 [LA]   1214 Potassium: 4.2 [LA]   1214 Chloride: 99 [LA]   1214 CO2: 26.0 [LA]   1214 Calcium: 10.0 [LA]   1214 Albumin: 4.50 [LA]   1214 ALT (SGPT): 19 [LA]   1214 AST (SGOT): 18 [LA]   1214 Total Bilirubin: 0.9 [LA]   1214 eGFR Non  Am: 87 [LA]   1214 Globulin: 3.3 [LA]   1214 A/G Ratio: 1.4 [LA]   1214 BUN/Creatinine Ratio(!): 25.3 [LA]   1214 WBC: 10.14 [LA]   1214 Platelets: 150 [LA]   1214 Hemoglobin(!): 19.1  This appears chronic  [LA]   1228 PROCEDURE: XR CHEST 1 VW-     HISTORY: SOA, hemoptysis     COMPARISON: None.     FINDINGS: The heart is normal in size. The mediastinum is unremarkable.  There is a nodular opacity in the right perihilar region. The left lung  is clear. There is no pneumothorax.  There are no acute osseous  abnormalities.     IMPRESSION:  Nodular opacity in the right perihilar region which should  be followed up with a CT of the chest with contrast for further  characterization.     Continued followup is recommended.     This report was signed and finalized on 2/18/2022 12:17 PM by Chantel Hinds M.D.. [LA]   1238 COVID19: Not Detected [LA]   1332 D-Dimer, Quant(!!): 1.00 [LA]   1341 Discussed findings with patient thus far.  He is agreeable to obtain CT.  Given elevated D-dimer, will obtain CT PE protocol. [LA]   1500 PROCEDURE: CT CHEST PULMONARY EMBOLISM-     HISTORY: Hemoptysis, abnormal x-ray, elevated D-dimer     COMPARISON: None .      TECHNIQUE: Multiple axial CT images were obtained from the thoracic  inlet through the upper abdomen following the administration of Isovue  300 per the CT PE protocol. Coronal and oblique 3D MIP images were  reconstructed from the original axial data set.      FINDINGS: There are no filling defects within the pulmonary arteries to  suggest an embolus. The thoracic aorta is normal in caliber with no  evidence of dissection. The heart is normal in size. There are no  pleural or pericardial effusions. There is no adenopathy. Lung windows  reveal a 3.6 x 3.2 cm mass in the right lower lobe. There is atelectasis  within the lingula. The visualized upper abdomen is unremarkable. Bone  windows reveal no acute osseous abnormalities.     IMPRESSION:  1. No evidence of pulmonary embolus or dissection.  2. 3.6 cm mass in the right lower lobe suspicious for a primary lung  carcinoma.           427.12 mGy.cm        This study was performed with techniques to keep radiation doses as low  as reasonably achievable (ALARA). Individualized dose reduction  techniques using automated exposure control or adjustment of mA and/or  kV according to the patient size were employed.      This report was signed and finalized on 2/18/2022 2:57 PM by Chantel Hinds M.D.. [LA]   1506 Discussed with Dr. Atnonio.  We will try to call Dr. Resendiz to see about further outpatient work-up. [LA]   1515 Discussed with Dr. Resendiz, he set up an appointment for the patient on Monday at 11:30 AM.  This was discussed with the patient.  He states he really just wants to go home at this time.  We will get discharge paperwork. [LA]      ED Course User Index  [LA] Carmen Hill PA-C  [TB] Nicole Antonio MD                                                 MDM  Number of Diagnoses or Management Options  Abnormal CT scan of lung  Hemoptysis  Tobacco use  Diagnosis management comments: On arrival, patient is stable.  He is normotensive, afebrile.  His  heart rate is 99, saturating 93% on room air.  Differential could include pneumonia, bronchitis, COPD exacerbation, ACS, cardiac dysrhythmia, pulmonary embolism, mass or neoplasm, and other concerns.  Will start with basic labs including troponin, D-dimer and proBNP, chest x-ray, EKG.  Will obtain COVID-19 in case patient does need nebulizers.  Will also give a dose of Decadron.  Patient is asking how long this is going to take, has a friend waiting for him in the car that has short-term memory loss.     Patient's labs are stable other than an elevated D-dimer.  Chest x-ray revealed an opacity in the right lung that recommended a CT scan.  Patient was agreeable to obtain this today.  There were no findings of pulmonary embolism per radiology read, but they did see findings of what was concerning for primary lung carcinoma.  This was discussed with the patient.  I discussed this with Dr. Antonio.  We were able to speak with Dr. Resendiz who is on-call for pulmonology.  He got patient follow-up appointment this upcoming Monday at 11:30 AM in his office.  Discussed all these plans with the patient.  Discussed work-up and follow-up and strict return precautions.  He verbalized understanding and was in agreement with this plan of care.       Amount and/or Complexity of Data Reviewed  Clinical lab tests: reviewed and ordered  Tests in the radiology section of CPT®: reviewed and ordered  Discussion of test results with the performing providers: yes  Decide to obtain previous medical records or to obtain history from someone other than the patient: yes  Review and summarize past medical records: yes  Discuss the patient with other providers: yes    Risk of Complications, Morbidity, and/or Mortality  Presenting problems: moderate  Diagnostic procedures: moderate  Management options: low    Patient Progress  Patient progress: stable      Final diagnoses:   Hemoptysis   Abnormal CT scan of lung   Tobacco use       ED  Disposition  ED Disposition     ED Disposition Condition Comment    Discharge Stable           Michelle Velez, APRN  [address] [facility] [address]  [facility] [address]  [phone]    Schedule an appointment as soon as possible for a visit       Kevon Resendiz MD  [address] [place] [address]  [phone]    Schedule an appointment as soon as possible for a visit on 2/21/2022  You have an appointment at 11:30 AM on Monday, 2/21/2022.         Medication List      No changes were made to your prescriptions during this visit.          Carmen Hill PA-C  02/18/22 1928

## 2022-02-18 NOTE — DISCHARGE INSTRUCTIONS
I see a mass in your lung that could be suspicious for cancer.  This will need further work-up with likely biopsy and other evaluation.  You have an appointment with our lung specialist Dr. Resendiz on Monday at 2/21/2022 at 11:30 AM.  You need to show up at this appointment so they can perform further evaluation.  He can follow-up to primary care provider as needed as well.  Return to the ER for any change, worsening of symptoms, or any additional concerns.

## 2022-02-21 ENCOUNTER — TELEPHONE (OUTPATIENT)
Dept: FAMILY MEDICINE CLINIC | Facility: CLINIC | Age: 72
End: 2022-02-21

## 2022-02-21 ENCOUNTER — TELEPHONE (OUTPATIENT)
Dept: PULMONOLOGY | Facility: CLINIC | Age: 72
End: 2022-02-21

## 2022-02-21 NOTE — TELEPHONE ENCOUNTER
Caller: Rey Garrison Jr.    Relationship: Self    Best call back number:     What is the best time to reach you: ANYTIME    Who are you requesting to speak with (clinical staff, provider,  specific staff member): VINH HINTON    Do you know the name of the person who called: [place]    What was the call regarding: CONVERSTATION ON FRIDAY    Do you require a callback: YES

## 2022-02-21 NOTE — TELEPHONE ENCOUNTER
Provider added patient on 02-21 at 11:30 am . Called patient to verify that patient cancel appointment. Talked to patient . Pt verified it wasn't an accident , he wanted to cancel at this time,  He wanted to think about what was going on. I explain to patient to please call us back to rescheduled. He understood when he was ready he would call the office back to reschedule. Thanks

## 2022-02-23 ENCOUNTER — TELEMEDICINE (OUTPATIENT)
Dept: FAMILY MEDICINE CLINIC | Facility: CLINIC | Age: 72
End: 2022-02-23

## 2022-02-23 DIAGNOSIS — F41.1 ANXIETY ASSOCIATED WITH CANCER DIAGNOSIS: Primary | ICD-10-CM

## 2022-02-23 DIAGNOSIS — C80.1 ANXIETY ASSOCIATED WITH CANCER DIAGNOSIS: Primary | ICD-10-CM

## 2022-02-23 PROCEDURE — 99214 OFFICE O/P EST MOD 30 MIN: CPT | Performed by: FAMILY MEDICINE

## 2022-02-23 RX ORDER — HYDROXYZINE HYDROCHLORIDE 25 MG/1
25 TABLET, FILM COATED ORAL EVERY 6 HOURS PRN
Qty: 100 TABLET | Refills: 5 | Status: SHIPPED | OUTPATIENT
Start: 2022-02-23 | End: 2022-04-21

## 2022-02-23 RX ORDER — VENLAFAXINE HYDROCHLORIDE 37.5 MG/1
37.5 CAPSULE, EXTENDED RELEASE ORAL DAILY
Qty: 30 CAPSULE | Refills: 5 | Status: SHIPPED | OUTPATIENT
Start: 2022-02-23 | End: 2022-03-18 | Stop reason: SDUPTHER

## 2022-02-23 NOTE — PROGRESS NOTES
"Rey Garrison Jr. is a 72 y.o. male who presents today for Anxiety    You have chosen to receive care through a telemedicine visit. Do you consent to use a telemedicine visit for your medical care today? Yes.     Patient has recently diagnosed with lung cancer and he has had increased anxiety and has had difficulty sleeping. He reports he can't think of anything else. He has trouble sleeping because he can't stop thinking about the cancer diagnosis. He took medication for depression in the past. He took zoloft for the depression and Wellbutrin to quit smoking but couldn't tell that they made a difference.        Review of Systems   Constitutional: Negative for fatigue and fever.   Neurological: Negative for dizziness and headache.   Psychiatric/Behavioral: Positive for decreased concentration, sleep disturbance, depressed mood and stress. Negative for agitation, behavioral problems, hallucinations, self-injury, suicidal ideas and negative for hyperactivity. The patient is nervous/anxious.         The following portions of the patient's history were reviewed and updated as appropriate: allergies, current medications, past family history, past medical history, past social history, past surgical history and problem list.    Current Outpatient Medications on File Prior to Visit   Medication Sig Dispense Refill   • albuterol sulfate  (90 Base) MCG/ACT inhaler Inhale 2 puffs Every 6 (Six) Hours As Needed for Wheezing. 8.5 g 2   • albuterol sulfate  (90 Base) MCG/ACT inhaler Inhale 2 puffs Every 6 (Six) Hours As Needed for Wheezing. 18 g 5   • B-D 3CC LUER-JAVI SYR 45TO6-9/2 23G X 1-1/2\" 3 ML misc USE WITH TESTOSTERONE 100 each 0   • doxazosin (CARDURA) 4 MG tablet Take 2 tablets by mouth Every Morning. 180 tablet 2   • finasteride (PROSCAR) 5 MG tablet TAKE 1 TABLET BY MOUTH EVERY DAY 90 tablet 3   • Fluticasone-Umeclidin-Vilant (Trelegy Ellipta) 200-62.5-25 MCG/INH inhaler Inhale 1 puff Daily. 60 each 11 "   • hydrocortisone 2.5 % cream APPLY TOPICALLY TO THE APPROPRIATE AREA AS DIRECTED 2 (TWO) TIMES A DAY. 28.35 g 3   • ketoconazole (NIZORAL) 2 % cream Apply to appropriate area twice daily 45 g 5   • Multiple Vitamin (MULTI-VITAMIN DAILY PO) Take 1 tablet by mouth Daily.     • sildenafil (VIAGRA) 100 MG tablet TAKE 1 TABLET BY MOUTH DAILY AS NEEDED FOR ERECTILE DYSFUNCTION. 15 tablet 5   • Testosterone Cypionate (DEPOTESTOTERONE CYPIONATE) 200 MG/ML injection Inject 1.5 ml IM every 14 days 2 mL 1   • tiZANidine (ZANAFLEX) 4 MG tablet TAKE 1 TABLET BY MOUTH 3 (THREE) TIMES A DAY AS NEEDED FOR MUSCLE SPASMS. 270 tablet 0   • triamcinolone (KENALOG) 0.025 % ointment Apply  topically to the appropriate area as directed 2 (Two) Times a Day. 15 g 0     No current facility-administered medications on file prior to visit.       Allergies   Allergen Reactions   • Penicillamine Unknown (See Comments)     As a child    • Penicillins Itching     Causes both the hands and the feet to itch    • Sulfa Antibiotics Itching     Causes both the hands and the feet to itch         There were no vitals taken for this visit.     Physical Exam  Constitutional:       General: He is not in acute distress.     Appearance: Normal appearance. He is not ill-appearing, toxic-appearing or diaphoretic.   Pulmonary:      Effort: Pulmonary effort is normal. No respiratory distress.   Neurological:      General: No focal deficit present.      Mental Status: He is alert. Mental status is at baseline.   Psychiatric:         Mood and Affect: Mood normal.         Behavior: Behavior normal.          Results for orders placed or performed during the hospital encounter of 02/18/22   COVID-19,An Bio IN-HOUSE,Nasal Swab No Transport Media 3-4 HR TAT - Swab, Nasal Cavity    Specimen: Nasal Cavity; Swab   Result Value Ref Range    COVID19 Not Detected Not Detected - Ref. Range   Comprehensive Metabolic Panel    Specimen: Blood   Result Value Ref Range     Glucose 118 (H) 65 - 99 mg/dL    BUN 22 8 - 23 mg/dL    Creatinine 0.87 0.76 - 1.27 mg/dL    Sodium 137 136 - 145 mmol/L    Potassium 4.2 3.5 - 5.2 mmol/L    Chloride 99 98 - 107 mmol/L    CO2 26.0 22.0 - 29.0 mmol/L    Calcium 10.0 8.6 - 10.5 mg/dL    Total Protein 7.8 6.0 - 8.5 g/dL    Albumin 4.50 3.50 - 5.20 g/dL    ALT (SGPT) 19 1 - 41 U/L    AST (SGOT) 18 1 - 40 U/L    Alkaline Phosphatase 93 39 - 117 U/L    Total Bilirubin 0.9 0.0 - 1.2 mg/dL    eGFR Non African Amer 87 >60 mL/min/1.73    Globulin 3.3 gm/dL    A/G Ratio 1.4 g/dL    BUN/Creatinine Ratio 25.3 (H) 7.0 - 25.0    Anion Gap 12.0 5.0 - 15.0 mmol/L   BNP    Specimen: Blood   Result Value Ref Range    proBNP 30.7 0.0 - 900.0 pg/mL   D-dimer, Quantitative    Specimen: Blood   Result Value Ref Range    D-Dimer, Quantitative 1.00 (C) 0.00 - 0.57 MCGFEU/mL   Troponin    Specimen: Blood   Result Value Ref Range    Troponin T <0.010 0.000 - 0.030 ng/mL   CBC Auto Differential    Specimen: Blood   Result Value Ref Range    WBC 10.14 3.40 - 10.80 10*3/mm3    RBC 5.92 (H) 4.14 - 5.80 10*6/mm3    Hemoglobin 19.1 (H) 13.0 - 17.7 g/dL    Hematocrit 53.5 (H) 37.5 - 51.0 %    MCV 90.4 79.0 - 97.0 fL    MCH 32.3 26.6 - 33.0 pg    MCHC 35.7 31.5 - 35.7 g/dL    RDW 14.4 12.3 - 15.4 %    RDW-SD 47.7 37.0 - 54.0 fl    MPV 9.6 6.0 - 12.0 fL    Platelets 150 140 - 450 10*3/mm3    Neutrophil % 70.3 42.7 - 76.0 %    Lymphocyte % 20.7 19.6 - 45.3 %    Monocyte % 7.0 5.0 - 12.0 %    Eosinophil % 0.9 0.3 - 6.2 %    Basophil % 0.6 0.0 - 1.5 %    Immature Grans % 0.5 0.0 - 0.5 %    Neutrophils, Absolute 7.13 (H) 1.70 - 7.00 10*3/mm3    Lymphocytes, Absolute 2.10 0.70 - 3.10 10*3/mm3    Monocytes, Absolute 0.71 0.10 - 0.90 10*3/mm3    Eosinophils, Absolute 0.09 0.00 - 0.40 10*3/mm3    Basophils, Absolute 0.06 0.00 - 0.20 10*3/mm3    Immature Grans, Absolute 0.05 0.00 - 0.05 10*3/mm3    nRBC 0.0 0.0 - 0.2 /100 WBC   Green Top (Gel)   Result Value Ref Range    Extra Tube Hold for  add-ons.    Lavender Top   Result Value Ref Range    Extra Tube hold for add-on    Gold Top - SST   Result Value Ref Range    Extra Tube Hold for add-ons.    Light Blue Top   Result Value Ref Range    Extra Tube hold for add-on         Problems Addressed this Visit        Mental Health    Anxiety associated with cancer diagnosis (HCC) - Primary     Patient reports worsening anxiety since recent lung cancer diagnosis.  He has been treated for anxiety and depression in the past with Zoloft but does not remember getting much relief from this medication.  We will start with Effexor daily and hydroxyzine as needed for anxiety during the day and nightly for sleep.  Patient will follow up in 8 weeks or sooner if needed to assess effectiveness of treatment.  If the patient is not having relief of symptoms at that time we will discuss other medication options and possible referral to counselor.         Relevant Medications    venlafaxine XR (EFFEXOR-XR) 37.5 MG 24 hr capsule    hydrOXYzine (ATARAX) 25 MG tablet      Diagnoses       Codes Comments    Anxiety associated with cancer diagnosis (HCC)    -  Primary ICD-10-CM: F41.1, C80.1  ICD-9-CM: 300.09           Return in about 8 weeks (around 4/20/2022) for Follow-up anxiety.    This was an audio and video enabled telemedicine encounter.    Rory Ayers MD   2/23/2022

## 2022-02-23 NOTE — ASSESSMENT & PLAN NOTE
Patient reports worsening anxiety since recent lung cancer diagnosis.  He has been treated for anxiety and depression in the past with Zoloft but does not remember getting much relief from this medication.  We will start with Effexor daily and hydroxyzine as needed for anxiety during the day and nightly for sleep.  Patient will follow up in 8 weeks or sooner if needed to assess effectiveness of treatment.  If the patient is not having relief of symptoms at that time we will discuss other medication options and possible referral to counselor.

## 2022-03-18 ENCOUNTER — READMISSION MANAGEMENT (OUTPATIENT)
Dept: CALL CENTER | Facility: HOSPITAL | Age: 72
End: 2022-03-18

## 2022-03-18 DIAGNOSIS — F41.1 ANXIETY ASSOCIATED WITH CANCER DIAGNOSIS: ICD-10-CM

## 2022-03-18 DIAGNOSIS — C80.1 ANXIETY ASSOCIATED WITH CANCER DIAGNOSIS: ICD-10-CM

## 2022-03-18 RX ORDER — VENLAFAXINE HYDROCHLORIDE 37.5 MG/1
37.5 CAPSULE, EXTENDED RELEASE ORAL DAILY
Qty: 30 CAPSULE | Refills: 5 | Status: SHIPPED | OUTPATIENT
Start: 2022-03-18 | End: 2022-03-23 | Stop reason: SDUPTHER

## 2022-03-18 NOTE — OUTREACH NOTE
Prep Survey    Flowsheet Row Responses   Orthodox facility patient discharged from? Non-BH   Is LACE score < 7 ? Non-BH Discharge   Emergency Room discharge w/ pulse ox? No   Eligibility [facility]   Date of Discharge 03/18/22   Discharge diagnosis Unavailable    Does the patient have one of the following disease processes/diagnoses(primary or secondary)? Other   Prep survey completed? Yes          BUNNY THOMAS - Registered Nurse

## 2022-03-21 ENCOUNTER — TELEPHONE (OUTPATIENT)
Dept: FAMILY MEDICINE CLINIC | Facility: CLINIC | Age: 72
End: 2022-03-21

## 2022-03-21 ENCOUNTER — TRANSITIONAL CARE MANAGEMENT TELEPHONE ENCOUNTER (OUTPATIENT)
Dept: CALL CENTER | Facility: HOSPITAL | Age: 72
End: 2022-03-21

## 2022-03-21 NOTE — TELEPHONE ENCOUNTER
Caller: Rey Garrison Jr.    Relationship: Self    Best call back number: [phone]     Who is your current provider: VINH HINTON     Who would you like your new provider to be: DR CATRINA BARRIOS     What are your reasons for transferring care: PATIENT FEELS LIKE DR BARRIOS LISTENED WELL TO HIM     Additional notes: PLEASE CALL IF HE CAN SWITCH TO DR BARRIOS FOR HIS PRIMACY CARE PHYSICAN

## 2022-03-21 NOTE — OUTREACH NOTE
Call Center TCM Note    Flowsheet Row Responses   [facility] [place] patient discharged from? Non-   Does the patient have one of the following disease processes/diagnoses(primary or secondary)? Other   TCM attempt successful? Yes   Call start time 1815   Call end time 1817   Discharge diagnosis Unavailable    Is the patient taking all medications as directed (includes completed medication regime)? Yes   Does the patient have a primary care provider?  Yes   Does the patient have an appointment with their PCP within 7 days of discharge? No   Comments States he has appt with Dr. Rivera and doesn't want to make appt.   Psychosocial issues? No   What is the patient's perception of their health status since discharge? Improving   TCM call completed? Yes   Wrap up additional comments Call was brief and state he is doing as well as can be expected.            Yola White LPN    3/21/2022, 18:18 EDT

## 2022-03-22 ENCOUNTER — TELEMEDICINE (OUTPATIENT)
Dept: FAMILY MEDICINE CLINIC | Facility: CLINIC | Age: 72
End: 2022-03-22

## 2022-03-22 ENCOUNTER — TELEPHONE (OUTPATIENT)
Dept: FAMILY MEDICINE CLINIC | Facility: CLINIC | Age: 72
End: 2022-03-22

## 2022-03-22 DIAGNOSIS — R53.81 PHYSICAL DECONDITIONING: ICD-10-CM

## 2022-03-22 DIAGNOSIS — R91.8 LUNG MASS: Primary | ICD-10-CM

## 2022-03-22 DIAGNOSIS — Z48.89 ENCOUNTER FOR POSTOPERATIVE WOUND CARE: ICD-10-CM

## 2022-03-22 PROBLEM — Z51.89 ENCOUNTER FOR WOUND CARE: Status: ACTIVE | Noted: 2022-03-22

## 2022-03-22 PROCEDURE — 99213 OFFICE O/P EST LOW 20 MIN: CPT | Performed by: FAMILY MEDICINE

## 2022-03-22 NOTE — ASSESSMENT & PLAN NOTE
Patient had drains in place after having part of his lung removed.  He needs help caring for the drains in the wound site.  Referral to [facility] home health was placed.

## 2022-03-22 NOTE — PROGRESS NOTES
Rey Garrison Jr. is a 72 y.o. male who presents today for Shortness of Breath    You have chosen to receive care through a telemedicine visit. Do you consent to use a telemedicine visit for your medical care today? Yes.      Patient needs order for home health. He needs home health 2/2 to having mass removed for his lung. He needs home health to come for wound care, PT, and OT. He does not need help with transportation at this time, but does have increased difficulty getting out of the house due to new oxygen requirement becoming significantly short of breath with ambulation which causes physical and emotional stress.  Patient is also struggling with some deconditioning after the procedure which he would like physical therapy for.  Patient has upcoming appointment for postop follow-up with his surgery team.  Patient states that the wound is healing well but he has drains in place which he is unsure how to care for.  Patient has no other complaints at this time.  He is already spoken with [facility] who has agreed to take him onto their service they just awaiting an order.       Review of Systems   Constitutional: Negative for fever and unexpected weight loss.   HENT: Negative for congestion, ear pain and sore throat.    Eyes: Negative for visual disturbance.   Respiratory: Positive for shortness of breath. Negative for cough and wheezing.    Cardiovascular: Positive for chest pain (at surgical site). Negative for palpitations.   Gastrointestinal: Negative for abdominal pain, blood in stool, constipation, diarrhea, nausea, vomiting and GERD.   Endocrine: Negative for polydipsia and polyuria.   Genitourinary: Negative for difficulty urinating.   Musculoskeletal: Negative for joint swelling.   Skin: Negative for rash and skin lesions.   Allergic/Immunologic: Negative for environmental allergies.   Neurological: Negative for seizures and syncope.   Hematological: Does not bruise/bleed easily.  "  Psychiatric/Behavioral: Negative for suicidal ideas.        The following portions of the patient's history were reviewed and updated as appropriate: allergies, current medications, past family history, past medical history, past social history, past surgical history and problem list.    Current Outpatient Medications on File Prior to Visit   Medication Sig Dispense Refill   • albuterol sulfate  (90 Base) MCG/ACT inhaler Inhale 2 puffs Every 6 (Six) Hours As Needed for Wheezing. 8.5 g 2   • albuterol sulfate  (90 Base) MCG/ACT inhaler Inhale 2 puffs Every 6 (Six) Hours As Needed for Wheezing. 18 g 5   • B-D 3CC LUER-JAVI SYR 99NL7-3/2 23G X 1-1/2\" 3 ML misc USE WITH TESTOSTERONE 100 each 0   • doxazosin (CARDURA) 4 MG tablet Take 2 tablets by mouth Every Morning. 180 tablet 2   • finasteride (PROSCAR) 5 MG tablet TAKE 1 TABLET BY MOUTH EVERY DAY 90 tablet 3   • Fluticasone-Umeclidin-Vilant (Trelegy Ellipta) 200-62.5-25 MCG/INH inhaler Inhale 1 puff Daily. 60 each 11   • hydrocortisone 2.5 % cream APPLY TOPICALLY TO THE APPROPRIATE AREA AS DIRECTED 2 (TWO) TIMES A DAY. 28.35 g 3   • hydrOXYzine (ATARAX) 25 MG tablet Take 1 tablet by mouth Every 6 (Six) Hours As Needed for Anxiety. 100 tablet 5   • ketoconazole (NIZORAL) 2 % cream Apply to appropriate area twice daily 45 g 5   • Multiple Vitamin (MULTI-VITAMIN DAILY PO) Take 1 tablet by mouth Daily.     • sildenafil (VIAGRA) 100 MG tablet TAKE 1 TABLET BY MOUTH DAILY AS NEEDED FOR ERECTILE DYSFUNCTION. 15 tablet 5   • Testosterone Cypionate (DEPOTESTOTERONE CYPIONATE) 200 MG/ML injection Inject 1.5 ml IM every 14 days 2 mL 1   • tiZANidine (ZANAFLEX) 4 MG tablet TAKE 1 TABLET BY MOUTH 3 (THREE) TIMES A DAY AS NEEDED FOR MUSCLE SPASMS. 270 tablet 0   • triamcinolone (KENALOG) 0.025 % ointment Apply  topically to the appropriate area as directed 2 (Two) Times a Day. 15 g 0   • venlafaxine XR (EFFEXOR-XR) 37.5 MG 24 hr capsule Take 1 capsule by mouth " Daily. 30 capsule 5     No current facility-administered medications on file prior to visit.       Allergies   Allergen Reactions   • Penicillamine Unknown (See Comments)     As a child    • Penicillins Itching     Causes both the hands and the feet to itch    • Sulfa Antibiotics Itching     Causes both the hands and the feet to itch         There were no vitals taken for this visit.     Physical Exam  Constitutional:       General: He is not in acute distress.     Appearance: Normal appearance. He is not ill-appearing, toxic-appearing or diaphoretic.   Pulmonary:      Effort: Pulmonary effort is normal. No respiratory distress.   Neurological:      General: No focal deficit present.      Mental Status: He is alert. Mental status is at baseline.   Psychiatric:         Mood and Affect: Mood normal.         Behavior: Behavior normal.          Problems Addressed this Visit        Health Encounters    Encounter for postoperative wound care     Patient had drains in place after having part of his lung removed.  He needs help caring for the drains in the wound site.  Referral to [facility] home health was placed.           Relevant Orders    Ambulatory Referral to Home Health       Pulmonary and Pneumonias    Lung mass - Primary    Relevant Orders    Ambulatory Referral to Home Health       Symptoms and Signs    Physical deconditioning     Patient has had some significant physical deconditioning after having operation on his lung.  [facility] health has agreed to take him.  Referral was placed for PT and OT.           Relevant Orders    Ambulatory Referral to Home Health      Diagnoses       Codes Comments    Lung mass    -  Primary ICD-10-CM: R91.8  ICD-9-CM: 786.6     Physical deconditioning     ICD-10-CM: R53.81  ICD-9-CM: 799.3     Encounter for postoperative wound care     ICD-10-CM: Z48.89  ICD-9-CM: V58.49           Return if symptoms worsen or fail to improve.    This was an audio and video enabled telemedicine  encounter.    20 minutes was spent on this patient encounter which included history taking, physical exam, answering patient questions, counseling, discussing treatment plan, placing orders, and documentation.    Rory Ayers MD   3/22/2022

## 2022-03-22 NOTE — ASSESSMENT & PLAN NOTE
Patient has had some significant physical deconditioning after having operation on his lung.  [facility] health has agreed to take him.  Referral was placed for PT and OT.

## 2022-03-22 NOTE — TELEPHONE ENCOUNTER
Caller: Rey Garrison Jr.    Relationship to patient: Self    Best call back number: [phone]    Patient is needing: WAITING ON SOPH[place], 4:30 APPT. FOR [facility]UAL AND HASN'T HEARD ANYTHING. PLEASE CALL AND LET HIM KNOW IF HE IS RUNNING BEHIND.

## 2022-03-23 DIAGNOSIS — C80.1 ANXIETY ASSOCIATED WITH CANCER DIAGNOSIS: ICD-10-CM

## 2022-03-23 DIAGNOSIS — F41.1 ANXIETY ASSOCIATED WITH CANCER DIAGNOSIS: ICD-10-CM

## 2022-03-23 RX ORDER — VENLAFAXINE HYDROCHLORIDE 37.5 MG/1
37.5 CAPSULE, EXTENDED RELEASE ORAL DAILY
Qty: 30 CAPSULE | Refills: 5 | Status: SHIPPED | OUTPATIENT
Start: 2022-03-23 | End: 2023-02-14 | Stop reason: SDUPTHER

## 2022-03-24 ENCOUNTER — APPOINTMENT (OUTPATIENT)
Dept: GENERAL RADIOLOGY | Facility: HOSPITAL | Age: 72
End: 2022-03-24

## 2022-03-24 ENCOUNTER — APPOINTMENT (OUTPATIENT)
Dept: CT IMAGING | Facility: HOSPITAL | Age: 72
End: 2022-03-24

## 2022-03-24 ENCOUNTER — HOSPITAL ENCOUNTER (EMERGENCY)
Facility: HOSPITAL | Age: 72
Discharge: ANOTHER HOSPITAL | End: 2022-03-24
Attending: EMERGENCY MEDICINE | Admitting: EMERGENCY MEDICINE

## 2022-03-24 VITALS
OXYGEN SATURATION: 99 % | WEIGHT: 220 LBS | TEMPERATURE: 97.9 F | SYSTOLIC BLOOD PRESSURE: 96 MMHG | DIASTOLIC BLOOD PRESSURE: 71 MMHG | HEART RATE: 72 BPM | RESPIRATION RATE: 22 BRPM

## 2022-03-24 DIAGNOSIS — I46.9 CARDIAC ARREST: ICD-10-CM

## 2022-03-24 DIAGNOSIS — J96.01 ACUTE RESPIRATORY FAILURE WITH HYPOXIA AND HYPERCAPNIA: ICD-10-CM

## 2022-03-24 DIAGNOSIS — J93.9 BILATERAL PNEUMOTHORAX: Primary | ICD-10-CM

## 2022-03-24 DIAGNOSIS — J18.9 PNEUMONIA DUE TO INFECTIOUS ORGANISM, UNSPECIFIED LATERALITY, UNSPECIFIED PART OF LUNG: ICD-10-CM

## 2022-03-24 DIAGNOSIS — Z85.118 HISTORY OF LUNG CANCER: ICD-10-CM

## 2022-03-24 DIAGNOSIS — J96.02 ACUTE RESPIRATORY FAILURE WITH HYPOXIA AND HYPERCAPNIA: ICD-10-CM

## 2022-03-24 LAB
A-A DO2: 329.4 MMHG
A-A DO2: 503.5 MMHG
ALBUMIN SERPL-MCNC: 2.5 G/DL (ref 3.5–5.2)
ALBUMIN/GLOB SERPL: 0.9 G/DL
ALP SERPL-CCNC: 65 U/L (ref 39–117)
ALT SERPL W P-5'-P-CCNC: 18 U/L (ref 1–41)
ANION GAP SERPL CALCULATED.3IONS-SCNC: 8.8 MMOL/L (ref 5–15)
ARTERIAL PATENCY WRIST A: ABNORMAL
ARTERIAL PATENCY WRIST A: ABNORMAL
AST SERPL-CCNC: 23 U/L (ref 1–40)
ATMOSPHERIC PRESS: 731 MMHG
ATMOSPHERIC PRESS: 732 MMHG
B PARAPERT DNA SPEC QL NAA+PROBE: NOT DETECTED
B PERT DNA SPEC QL NAA+PROBE: NOT DETECTED
BACTERIA UR QL AUTO: ABNORMAL /HPF
BASE EXCESS BLDA CALC-SCNC: 1.6 MMOL/L (ref 0–2)
BASE EXCESS BLDA CALC-SCNC: 2.1 MMOL/L (ref 0–2)
BASOPHILS # BLD AUTO: 0.09 10*3/MM3 (ref 0–0.2)
BASOPHILS NFR BLD AUTO: 0.5 % (ref 0–1.5)
BDY SITE: ABNORMAL
BDY SITE: ABNORMAL
BILIRUB SERPL-MCNC: 0.2 MG/DL (ref 0–1.2)
BILIRUB UR QL STRIP: NEGATIVE
BUN SERPL-MCNC: 11 MG/DL (ref 8–23)
BUN/CREAT SERPL: 19.6 (ref 7–25)
C PNEUM DNA NPH QL NAA+NON-PROBE: NOT DETECTED
CALCIUM SPEC-SCNC: 7 MG/DL (ref 8.6–10.5)
CHLORIDE SERPL-SCNC: 109 MMOL/L (ref 98–107)
CLARITY UR: ABNORMAL
CO2 SERPL-SCNC: 25.2 MMOL/L (ref 22–29)
COHGB MFR BLD: <0.7 % (ref 0–2)
COHGB MFR BLD: <0.7 % (ref 0–2)
COLOR UR: YELLOW
CREAT SERPL-MCNC: 0.56 MG/DL (ref 0.76–1.27)
D-LACTATE SERPL-SCNC: 1.1 MMOL/L (ref 0.5–2)
D-LACTATE SERPL-SCNC: 2.4 MMOL/L (ref 0.5–2)
DEPRECATED RDW RBC AUTO: 48.7 FL (ref 37–54)
EGFRCR SERPLBLD CKD-EPI 2021: 106 ML/MIN/1.73
EOSINOPHIL # BLD AUTO: 0.26 10*3/MM3 (ref 0–0.4)
EOSINOPHIL NFR BLD AUTO: 1.6 % (ref 0.3–6.2)
ERYTHROCYTE [DISTWIDTH] IN BLOOD BY AUTOMATED COUNT: 13.5 % (ref 12.3–15.4)
FLUAV SUBTYP SPEC NAA+PROBE: NOT DETECTED
FLUBV RNA ISLT QL NAA+PROBE: NOT DETECTED
GLOBULIN UR ELPH-MCNC: 2.8 GM/DL
GLUCOSE SERPL-MCNC: 168 MG/DL (ref 65–99)
GLUCOSE UR STRIP-MCNC: NEGATIVE MG/DL
HADV DNA SPEC NAA+PROBE: NOT DETECTED
HCO3 BLDA-SCNC: 31.3 MMOL/L (ref 22–28)
HCO3 BLDA-SCNC: 33.1 MMOL/L (ref 22–28)
HCOV 229E RNA SPEC QL NAA+PROBE: NOT DETECTED
HCOV HKU1 RNA SPEC QL NAA+PROBE: NOT DETECTED
HCOV NL63 RNA SPEC QL NAA+PROBE: NOT DETECTED
HCOV OC43 RNA SPEC QL NAA+PROBE: NOT DETECTED
HCT VFR BLD AUTO: 41.8 % (ref 37.5–51)
HCT VFR BLD CALC: 40.6 %
HCT VFR BLD CALC: 42.8 %
HGB BLD-MCNC: 13.6 G/DL (ref 13–17.7)
HGB UR QL STRIP.AUTO: ABNORMAL
HMPV RNA NPH QL NAA+NON-PROBE: NOT DETECTED
HOLD SPECIMEN: NORMAL
HOLD SPECIMEN: NORMAL
HPIV1 RNA ISLT QL NAA+PROBE: NOT DETECTED
HPIV2 RNA SPEC QL NAA+PROBE: NOT DETECTED
HPIV3 RNA NPH QL NAA+PROBE: NOT DETECTED
HPIV4 P GENE NPH QL NAA+PROBE: NOT DETECTED
HYALINE CASTS UR QL AUTO: ABNORMAL /LPF
IMM GRANULOCYTES # BLD AUTO: 0.69 10*3/MM3 (ref 0–0.05)
IMM GRANULOCYTES NFR BLD AUTO: 4.2 % (ref 0–0.5)
INHALED O2 CONCENTRATION: 100 %
INHALED O2 CONCENTRATION: 100 %
KETONES UR QL STRIP: NEGATIVE
LEUKOCYTE ESTERASE UR QL STRIP.AUTO: NEGATIVE
LYMPHOCYTES # BLD AUTO: 4.06 10*3/MM3 (ref 0.7–3.1)
LYMPHOCYTES NFR BLD AUTO: 24.6 % (ref 19.6–45.3)
Lab: ABNORMAL
Lab: ABNORMAL
M PNEUMO IGG SER IA-ACNC: NOT DETECTED
MAGNESIUM SERPL-MCNC: 1.6 MG/DL (ref 1.6–2.4)
MCH RBC QN AUTO: 31.5 PG (ref 26.6–33)
MCHC RBC AUTO-ENTMCNC: 32.5 G/DL (ref 31.5–35.7)
MCV RBC AUTO: 96.8 FL (ref 79–97)
METHGB BLD QL: 0.4 % (ref 0–1.5)
METHGB BLD QL: 0.5 % (ref 0–1.5)
MODALITY: ABNORMAL
MODALITY: ABNORMAL
MONOCYTES # BLD AUTO: 0.9 10*3/MM3 (ref 0.1–0.9)
MONOCYTES NFR BLD AUTO: 5.4 % (ref 5–12)
NEUTROPHILS NFR BLD AUTO: 10.53 10*3/MM3 (ref 1.7–7)
NEUTROPHILS NFR BLD AUTO: 63.7 % (ref 42.7–76)
NITRITE UR QL STRIP: NEGATIVE
NOTE: ABNORMAL
NOTE: ABNORMAL
NRBC BLD AUTO-RTO: 0 /100 WBC (ref 0–0.2)
NT-PROBNP SERPL-MCNC: 147.4 PG/ML (ref 0–900)
OXYHGB MFR BLDV: 94.1 % (ref 94–99)
OXYHGB MFR BLDV: 98.8 % (ref 94–99)
PCO2 BLDA: 75.1 MM HG (ref 35–45)
PCO2 BLDA: 85.7 MM HG (ref 35–45)
PCO2 TEMP ADJ BLD: ABNORMAL MM[HG]
PCO2 TEMP ADJ BLD: ABNORMAL MM[HG]
PH BLDA: 7.2 PH UNITS (ref 7.35–7.45)
PH BLDA: 7.23 PH UNITS (ref 7.35–7.45)
PH UR STRIP.AUTO: <=5 [PH] (ref 5–8)
PH, TEMP CORRECTED: ABNORMAL
PH, TEMP CORRECTED: ABNORMAL
PLATELET # BLD AUTO: 415 10*3/MM3 (ref 140–450)
PMV BLD AUTO: 9.9 FL (ref 6–12)
PO2 BLDA: 280 MM HG (ref 75–100)
PO2 BLDA: 94.9 MM HG (ref 75–100)
PO2 TEMP ADJ BLD: ABNORMAL MM[HG]
PO2 TEMP ADJ BLD: ABNORMAL MM[HG]
POTASSIUM SERPL-SCNC: 3.4 MMOL/L (ref 3.5–5.2)
PROCALCITONIN SERPL-MCNC: 0.03 NG/ML (ref 0–0.25)
PROT SERPL-MCNC: 5.3 G/DL (ref 6–8.5)
PROT UR QL STRIP: ABNORMAL
RBC # BLD AUTO: 4.32 10*6/MM3 (ref 4.14–5.8)
RBC # UR STRIP: ABNORMAL /HPF
REF LAB TEST METHOD: ABNORMAL
RHINOVIRUS RNA SPEC NAA+PROBE: NOT DETECTED
RSV RNA NPH QL NAA+NON-PROBE: NOT DETECTED
SAO2 % BLDCOA: 95 % (ref 94–100)
SAO2 % BLDCOA: 99.7 % (ref 94–100)
SARS-COV-2 RNA NPH QL NAA+NON-PROBE: NOT DETECTED
SODIUM SERPL-SCNC: 143 MMOL/L (ref 136–145)
SP GR UR STRIP: 1.02 (ref 1–1.03)
SQUAMOUS #/AREA URNS HPF: ABNORMAL /HPF
TROPONIN T SERPL-MCNC: <0.01 NG/ML (ref 0–0.03)
UROBILINOGEN UR QL STRIP: ABNORMAL
VENTILATOR MODE: ABNORMAL
VENTILATOR MODE: ABNORMAL
WBC # UR STRIP: ABNORMAL /HPF
WBC NRBC COR # BLD: 16.53 10*3/MM3 (ref 3.4–10.8)
WHOLE BLOOD HOLD SPECIMEN: NORMAL
WHOLE BLOOD HOLD SPECIMEN: NORMAL

## 2022-03-24 PROCEDURE — 36600 WITHDRAWAL OF ARTERIAL BLOOD: CPT

## 2022-03-24 PROCEDURE — 25010000002 EPINEPHRINE 1 MG/10ML SOLUTION PREFILLED SYRINGE: Performed by: EMERGENCY MEDICINE

## 2022-03-24 PROCEDURE — 71045 X-RAY EXAM CHEST 1 VIEW: CPT

## 2022-03-24 PROCEDURE — 94799 UNLISTED PULMONARY SVC/PX: CPT

## 2022-03-24 PROCEDURE — 25010000002 VANCOMYCIN 5 G RECONSTITUTED SOLUTION: Performed by: EMERGENCY MEDICINE

## 2022-03-24 PROCEDURE — 0202U NFCT DS 22 TRGT SARS-COV-2: CPT | Performed by: EMERGENCY MEDICINE

## 2022-03-24 PROCEDURE — 25010000002 SUCCINYLCHOLINE PER 20 MG: Performed by: EMERGENCY MEDICINE

## 2022-03-24 PROCEDURE — 96361 HYDRATE IV INFUSION ADD-ON: CPT

## 2022-03-24 PROCEDURE — 99284 EMERGENCY DEPT VISIT MOD MDM: CPT

## 2022-03-24 PROCEDURE — 83605 ASSAY OF LACTIC ACID: CPT | Performed by: EMERGENCY MEDICINE

## 2022-03-24 PROCEDURE — 25010000002 FENTANYL CITRATE (PF) 50 MCG/ML SOLUTION: Performed by: EMERGENCY MEDICINE

## 2022-03-24 PROCEDURE — 93005 ELECTROCARDIOGRAM TRACING: CPT | Performed by: EMERGENCY MEDICINE

## 2022-03-24 PROCEDURE — 92950 HEART/LUNG RESUSCITATION CPR: CPT

## 2022-03-24 PROCEDURE — 83735 ASSAY OF MAGNESIUM: CPT | Performed by: EMERGENCY MEDICINE

## 2022-03-24 PROCEDURE — 25010000002 PIPERACILLIN SOD-TAZOBACTAM PER 1 G: Performed by: EMERGENCY MEDICINE

## 2022-03-24 PROCEDURE — 94002 VENT MGMT INPAT INIT DAY: CPT

## 2022-03-24 PROCEDURE — 83880 ASSAY OF NATRIURETIC PEPTIDE: CPT | Performed by: EMERGENCY MEDICINE

## 2022-03-24 PROCEDURE — 31500 INSERT EMERGENCY AIRWAY: CPT

## 2022-03-24 PROCEDURE — 82805 BLOOD GASES W/O2 SATURATION: CPT

## 2022-03-24 PROCEDURE — 84145 PROCALCITONIN (PCT): CPT | Performed by: EMERGENCY MEDICINE

## 2022-03-24 PROCEDURE — 25010000002 IOPAMIDOL 61 % SOLUTION: Performed by: EMERGENCY MEDICINE

## 2022-03-24 PROCEDURE — 83050 HGB METHEMOGLOBIN QUAN: CPT

## 2022-03-24 PROCEDURE — 84484 ASSAY OF TROPONIN QUANT: CPT | Performed by: EMERGENCY MEDICINE

## 2022-03-24 PROCEDURE — 99291 CRITICAL CARE FIRST HOUR: CPT

## 2022-03-24 PROCEDURE — 96366 THER/PROPH/DIAG IV INF ADDON: CPT

## 2022-03-24 PROCEDURE — 96367 TX/PROPH/DG ADDL SEQ IV INF: CPT

## 2022-03-24 PROCEDURE — 71275 CT ANGIOGRAPHY CHEST: CPT

## 2022-03-24 PROCEDURE — 25010000002 MIDAZOLAM PER 1 MG: Performed by: EMERGENCY MEDICINE

## 2022-03-24 PROCEDURE — 96365 THER/PROPH/DIAG IV INF INIT: CPT

## 2022-03-24 PROCEDURE — 80053 COMPREHEN METABOLIC PANEL: CPT | Performed by: EMERGENCY MEDICINE

## 2022-03-24 PROCEDURE — 87040 BLOOD CULTURE FOR BACTERIA: CPT | Performed by: EMERGENCY MEDICINE

## 2022-03-24 PROCEDURE — 96375 TX/PRO/DX INJ NEW DRUG ADDON: CPT

## 2022-03-24 PROCEDURE — 36415 COLL VENOUS BLD VENIPUNCTURE: CPT

## 2022-03-24 PROCEDURE — 81001 URINALYSIS AUTO W/SCOPE: CPT | Performed by: EMERGENCY MEDICINE

## 2022-03-24 PROCEDURE — 85025 COMPLETE CBC W/AUTO DIFF WBC: CPT | Performed by: EMERGENCY MEDICINE

## 2022-03-24 PROCEDURE — 82375 ASSAY CARBOXYHB QUANT: CPT

## 2022-03-24 PROCEDURE — 25010000002 MIDAZOLAM PER 1MG: Performed by: EMERGENCY MEDICINE

## 2022-03-24 PROCEDURE — 96368 THER/DIAG CONCURRENT INF: CPT

## 2022-03-24 PROCEDURE — 96376 TX/PRO/DX INJ SAME DRUG ADON: CPT

## 2022-03-24 RX ORDER — MIDAZOLAM HYDROCHLORIDE 2 MG/2ML
2 INJECTION, SOLUTION INTRAMUSCULAR; INTRAVENOUS ONCE
Status: COMPLETED | OUTPATIENT
Start: 2022-03-24 | End: 2022-03-24

## 2022-03-24 RX ORDER — MIDAZOLAM HYDROCHLORIDE 1 MG/ML
2 INJECTION INTRAMUSCULAR; INTRAVENOUS ONCE
Status: COMPLETED | OUTPATIENT
Start: 2022-03-24 | End: 2022-03-24

## 2022-03-24 RX ORDER — SODIUM CHLORIDE 0.9 % (FLUSH) 0.9 %
10 SYRINGE (ML) INJECTION AS NEEDED
Status: DISCONTINUED | OUTPATIENT
Start: 2022-03-24 | End: 2022-03-24 | Stop reason: HOSPADM

## 2022-03-24 RX ORDER — MIDAZOLAM HYDROCHLORIDE 1 MG/ML
1-10 INJECTION, SOLUTION INTRAVENOUS
Status: DISCONTINUED | OUTPATIENT
Start: 2022-03-24 | End: 2022-03-24 | Stop reason: HOSPADM

## 2022-03-24 RX ORDER — SUCCINYLCHOLINE CHLORIDE 20 MG/ML
120 INJECTION INTRAMUSCULAR; INTRAVENOUS ONCE
Status: COMPLETED | OUTPATIENT
Start: 2022-03-24 | End: 2022-03-24

## 2022-03-24 RX ORDER — FENTANYL CITRATE 50 UG/ML
100 INJECTION, SOLUTION INTRAMUSCULAR; INTRAVENOUS ONCE
Status: DISCONTINUED | OUTPATIENT
Start: 2022-03-24 | End: 2022-03-24

## 2022-03-24 RX ORDER — FENTANYL CITRATE 50 UG/ML
50 INJECTION, SOLUTION INTRAMUSCULAR; INTRAVENOUS ONCE
Status: COMPLETED | OUTPATIENT
Start: 2022-03-24 | End: 2022-03-24

## 2022-03-24 RX ORDER — ETOMIDATE 2 MG/ML
20 INJECTION INTRAVENOUS ONCE
Status: COMPLETED | OUTPATIENT
Start: 2022-03-24 | End: 2022-03-24

## 2022-03-24 RX ORDER — EPINEPHRINE 0.1 MG/ML
SYRINGE (ML) INJECTION
Status: COMPLETED | OUTPATIENT
Start: 2022-03-24 | End: 2022-03-24

## 2022-03-24 RX ADMIN — FENTANYL CITRATE 50 MCG: 50 INJECTION INTRAMUSCULAR; INTRAVENOUS at 09:05

## 2022-03-24 RX ADMIN — MIDAZOLAM HYDROCHLORIDE 2 MG/HR: 1 INJECTION, SOLUTION INTRAVENOUS at 08:34

## 2022-03-24 RX ADMIN — SODIUM CHLORIDE 1000 ML: 9 INJECTION, SOLUTION INTRAVENOUS at 08:30

## 2022-03-24 RX ADMIN — TAZOBACTAM SODIUM AND PIPERACILLIN SODIUM 3.38 G: 375; 3 INJECTION, SOLUTION INTRAVENOUS at 10:59

## 2022-03-24 RX ADMIN — MIDAZOLAM HYDROCHLORIDE 2 MG: 1 INJECTION, SOLUTION INTRAMUSCULAR; INTRAVENOUS at 08:35

## 2022-03-24 RX ADMIN — VANCOMYCIN HYDROCHLORIDE 2000 MG: 5 INJECTION, POWDER, LYOPHILIZED, FOR SOLUTION INTRAVENOUS at 10:55

## 2022-03-24 RX ADMIN — SODIUM CHLORIDE 2994 ML: 9 INJECTION, SOLUTION INTRAVENOUS at 11:19

## 2022-03-24 RX ADMIN — MIDAZOLAM HYDROCHLORIDE 2 MG: 1 INJECTION, SOLUTION INTRAMUSCULAR; INTRAVENOUS at 08:42

## 2022-03-24 RX ADMIN — IOPAMIDOL 100 ML: 612 INJECTION, SOLUTION INTRAVENOUS at 12:41

## 2022-03-24 RX ADMIN — ETOMIDATE 20 MG: 2 INJECTION, SOLUTION INTRAVENOUS at 08:07

## 2022-03-24 RX ADMIN — SUCCINYLCHOLINE CHLORIDE 120 MG: 20 INJECTION, SOLUTION INTRAMUSCULAR; INTRAVENOUS at 08:08

## 2022-03-24 RX ADMIN — EPINEPHRINE 1 MG: 0.1 INJECTION, SOLUTION ENDOTRACHEAL; INTRACARDIAC; INTRAVENOUS at 10:01

## 2022-03-24 RX ADMIN — FENTANYL CITRATE 50 MCG: 50 INJECTION, SOLUTION INTRAMUSCULAR; INTRAVENOUS at 17:12

## 2022-03-24 RX ADMIN — MIDAZOLAM HYDROCHLORIDE 2 MG: 1 INJECTION, SOLUTION INTRAMUSCULAR; INTRAVENOUS at 08:02

## 2022-03-29 LAB
BACTERIA SPEC AEROBE CULT: NORMAL
BACTERIA SPEC AEROBE CULT: NORMAL

## 2022-04-13 ENCOUNTER — TELEPHONE (OUTPATIENT)
Dept: FAMILY MEDICINE CLINIC | Facility: CLINIC | Age: 72
End: 2022-04-13

## 2022-04-13 NOTE — TELEPHONE ENCOUNTER
Caller: JOSÉ LUIS    Relationship to patient: [facility]    Best call back number: [phone]    New or established patient?  [] New  [x] Established    Date of discharge: 04/15/22    Facility discharged from: [facility]    Diagnosis/Symptoms: NOT STATED PER DISCHARGE NOTE PER JOSÉ LUIS    ADDITIONAL NOTE:  DR. BARRIOS'S PATIENT- [facility] REQUESTED PATIENT TO BE SEEN SAME DAY AS DISCHARGE- DR BARRIOS DID NOT HAVE AN OPENING AT THAT TIME. PATIENT IS SCHEDULED WITH MANOHAR VERDE.    PATIENT CALLED BACK TO CHANGE APPOINTMENT TO ALLAN OTHER OPTIONS VISIT. STATED HE WOULD DISCHARGE 4/14/22 INSTEAD OF 4/15/22. PATIENT IS NOW SCHEDULED WITH ANTONIO COELLO

## 2022-04-14 ENCOUNTER — READMISSION MANAGEMENT (OUTPATIENT)
Dept: CALL CENTER | Facility: HOSPITAL | Age: 72
End: 2022-04-14

## 2022-04-14 ENCOUNTER — TELEPHONE (OUTPATIENT)
Dept: FAMILY MEDICINE CLINIC | Facility: CLINIC | Age: 72
End: 2022-04-14

## 2022-04-14 NOTE — OUTREACH NOTE
Prep Survey    Flowsheet Row Responses   Spiritism facility patient discharged from? Non-BH   Is LACE score < 7 ? Non-BH Discharge   Emergency Room discharge w/ pulse ox? No   Eligibility [facility]   Date of Discharge 04/14/22   Discharge Disposition [facility]   Discharge diagnosis Unavailable    Does the patient have one of the following disease processes/diagnoses(primary or secondary)? Other   Prep survey completed? Yes          BUNNY THOMAS - Registered Nurse

## 2022-04-19 ENCOUNTER — TELEPHONE (OUTPATIENT)
Dept: FAMILY MEDICINE CLINIC | Facility: CLINIC | Age: 72
End: 2022-04-19

## 2022-04-19 NOTE — TELEPHONE ENCOUNTER
Caller: Rey Garrison Jr.    Relationship: Self    Best call back number: [phone]    What is the best time to reach you: ANY    What was the call regarding: PLEASE FAX A RELEASE OF INFORMATION TO [facility] MEDICAL RECORDS DEPARTMENT: FAX: [phone]    Do you require a callback: NO- JUST FAX RELEASE

## 2022-04-19 NOTE — TELEPHONE ENCOUNTER
Please have patient discontinue hydroxyzine as amiodarone was recently added to his medication regimen.

## 2022-04-19 NOTE — TELEPHONE ENCOUNTER
Caller: [facility]    Relationship to patient: HOME HEALTH    Best call back number: [phone]    What is the call regarding:  ADELA STATES THAT THERE IS A DRUG INTERACTION BETWEEN AMIODORONE WITH HYDROXYZINE.  PHYSICAL THERAPY FREQUENCY WILL BE WEEKLY AND OCCUPATION AND PHYSICAL THERAPY HAVE EVALUATIONS.

## 2022-04-21 ENCOUNTER — OFFICE VISIT (OUTPATIENT)
Dept: FAMILY MEDICINE CLINIC | Facility: CLINIC | Age: 72
End: 2022-04-21

## 2022-04-21 VITALS
OXYGEN SATURATION: 98 % | WEIGHT: 168 LBS | BODY MASS INDEX: 25.46 KG/M2 | HEART RATE: 79 BPM | HEIGHT: 68 IN | TEMPERATURE: 96.6 F | SYSTOLIC BLOOD PRESSURE: 116 MMHG | DIASTOLIC BLOOD PRESSURE: 60 MMHG

## 2022-04-21 DIAGNOSIS — I10 ESSENTIAL HYPERTENSION: ICD-10-CM

## 2022-04-21 DIAGNOSIS — L30.9 DERMATITIS OF EXTERNAL EAR: ICD-10-CM

## 2022-04-21 DIAGNOSIS — E29.1 HYPOGONADISM IN MALE: ICD-10-CM

## 2022-04-21 PROBLEM — I48.0 PAROXYSMAL ATRIAL FIBRILLATION (HCC): Status: ACTIVE | Noted: 2022-04-13

## 2022-04-21 PROCEDURE — 99214 OFFICE O/P EST MOD 30 MIN: CPT | Performed by: FAMILY MEDICINE

## 2022-04-21 RX ORDER — TESTOSTERONE CYPIONATE 200 MG/ML
INJECTION, SOLUTION INTRAMUSCULAR
Qty: 2 ML | Refills: 1 | Status: SHIPPED | OUTPATIENT
Start: 2022-04-21 | End: 2022-08-17 | Stop reason: SDUPTHER

## 2022-04-21 RX ORDER — TRIAMCINOLONE ACETONIDE 5 MG/G
1 CREAM TOPICAL 2 TIMES DAILY
Qty: 30 G | Refills: 3 | Status: SHIPPED | OUTPATIENT
Start: 2022-04-21 | End: 2022-08-01 | Stop reason: SDUPTHER

## 2022-04-21 RX ORDER — FAMOTIDINE 20 MG/1
20 TABLET, FILM COATED ORAL
COMMUNITY
Start: 2022-04-13

## 2022-04-21 RX ORDER — DOXAZOSIN MESYLATE 4 MG/1
8 TABLET ORAL DAILY
Qty: 180 TABLET | Refills: 3 | Status: SHIPPED | OUTPATIENT
Start: 2022-04-21 | End: 2022-06-20 | Stop reason: SDUPTHER

## 2022-04-21 RX ORDER — CHOLECALCIFEROL (VITAMIN D3) 1250 MCG
50 CAPSULE ORAL
COMMUNITY
Start: 2022-03-18

## 2022-04-21 RX ORDER — OXYCODONE HYDROCHLORIDE 5 MG/1
TABLET ORAL
COMMUNITY
Start: 2022-03-16 | End: 2022-08-01

## 2022-04-21 RX ORDER — AMIODARONE HYDROCHLORIDE 200 MG/1
TABLET ORAL
COMMUNITY
Start: 2022-04-13 | End: 2022-04-21

## 2022-04-21 RX ORDER — AMIODARONE HYDROCHLORIDE 200 MG/1
TABLET ORAL 2 TIMES DAILY
Status: SHIPPED | COMMUNITY
Start: 2022-04-21 | End: 2022-08-01 | Stop reason: ALTCHOICE

## 2022-04-21 NOTE — PROGRESS NOTES
Rey Garrison Jr. is a 72 y.o. male who presents today for Hospital Follow Up Visit      Patient had lobectomy on march 3rd. He was seen in the Ed on March 24 after having SOA at home arrived by ambulance intubated and has breat cardiac arrest with return of circulation and heart beat after CPR. He was then Transferred to [facility]. He was on the vent for 4-5 days. He was treated with IV antibiotics and had chest tubes placed. He was in ICU for several days. He was found to be in afib and was in for 3 more days to get treatment. He underwent OPAL and then has electric cardioversion on April 4th. He was discharged to [facility] on April 6th. He was discharged from [facility] on April 14th. Since being discharged from [facility] he has been off oxygen and feels that he has been recovering well. He still has low exercise tolerance. He denies SOA, chest pain, and palpitations at rest. Some SOA on exertion. He does still have some chest wall tenderness. He has seen pulmonologist, and both cardiologist this week. They adjusted some meds and will have him come back in 3 months and 6 months. He is on amiodarone 200mg BID which has been decreased from 400 BID. In one month it will be decreased to 100mg BID. Patient has home health PT/OT and nursing coming to the house and will continue to work with PT. He still has wounds he would like looked at where the chest tubes were. He denies pain, redness, and drainage from the wounds.        Review of Systems   Constitutional: Negative for fever and unexpected weight loss.   HENT: Negative for congestion, ear pain and sore throat.    Eyes: Negative for visual disturbance.   Respiratory: Positive for shortness of breath (on exertion). Negative for cough and wheezing.    Cardiovascular: Positive for chest pain (chest wall pain around sternum). Negative for palpitations.   Gastrointestinal: Negative for abdominal pain, blood in stool, constipation, diarrhea, nausea, vomiting  and GERD.   Endocrine: Negative for polydipsia and polyuria.   Genitourinary: Negative for difficulty urinating.   Musculoskeletal: Negative for joint swelling.   Skin: Negative for rash and skin lesions.   Allergic/Immunologic: Negative for environmental allergies.   Neurological: Negative for seizures and syncope.   Hematological: Does not bruise/bleed easily.   Psychiatric/Behavioral: Negative for suicidal ideas.        The following portions of the patient's history were reviewed and updated as appropriate: allergies, current medications, past family history, past medical history, past social history, past surgical history and problem list.    Current Outpatient Medications on File Prior to Visit   Medication Sig Dispense Refill   • albuterol sulfate  (90 Base) MCG/ACT inhaler Inhale 2 puffs Every 6 (Six) Hours As Needed for Wheezing. 8.5 g 2   • amiodarone (PACERONE) 200 MG tablet 2 (Two) Times a Day.     • apixaban (ELIQUIS) 5 MG tablet tablet 1 tablet Every 12 (Twelve) Hours. 60 tablet    • Cholecalciferol (Vitamin D3) 1.25 MG (82755 UT) capsule 50 mcg.     • famotidine (PEPCID) 20 MG tablet 20 mg.     • finasteride (PROSCAR) 5 MG tablet TAKE 1 TABLET BY MOUTH EVERY DAY 90 tablet 3   • Fluticasone-Umeclidin-Vilant (Trelegy Ellipta) 200-62.5-25 MCG/INH inhaler Inhale 1 puff Daily. 60 each 11   • ketoconazole (NIZORAL) 2 % cream Apply to appropriate area twice daily 45 g 5   • Multiple Vitamin (MULTI-VITAMIN DAILY PO) Take 1 tablet by mouth Daily.     • oxyCODONE (ROXICODONE) 5 MG immediate release tablet      • sildenafil (VIAGRA) 100 MG tablet TAKE 1 TABLET BY MOUTH DAILY AS NEEDED FOR ERECTILE DYSFUNCTION. 15 tablet 5   • venlafaxine XR (EFFEXOR-XR) 37.5 MG 24 hr capsule Take 1 capsule by mouth Daily. 30 capsule 5     No current facility-administered medications on file prior to visit.       Allergies   Allergen Reactions   • Alprazolam Hallucinations   • Penicillin G Unknown - Low Severity   •  "Sulfamethoxazole-Trimethoprim Hallucinations   • Penicillamine Unknown (See Comments)     As a child    • Penicillins Itching     Causes both the hands and the feet to itch    • Sulfa Antibiotics Itching     Causes both the hands and the feet to itch         Visit Vitals  /60   Pulse 79   Temp 96.6 °F (35.9 °C)   Ht 172.7 cm (68\")   Wt 76.2 kg (168 lb)   SpO2 98%   BMI 25.54 kg/m²        Physical Exam  Constitutional:       General: He is not in acute distress.     Appearance: He is well-developed. He is not diaphoretic.   HENT:      Head: Atraumatic.   Cardiovascular:      Rate and Rhythm: Normal rate and regular rhythm.      Heart sounds: Normal heart sounds. No murmur heard.    No friction rub. No gallop.   Pulmonary:      Effort: Pulmonary effort is normal. No respiratory distress.      Breath sounds: Normal breath sounds. No stridor. No wheezing, rhonchi or rales.   Musculoskeletal:      Cervical back: Normal range of motion and neck supple.   Skin:     General: Skin is warm and dry.   Neurological:      Mental Status: He is alert and oriented to person, place, and time.   Psychiatric:         Behavior: Behavior normal.          Results for orders placed or performed during the hospital encounter of 03/24/22   Respiratory Panel PCR w/COVID-19(SARS-CoV-2) NEL/VINCENT/MERCEDES/PAD/COR/MAD/LIONEL In-House, NP Swab in UTM/VTM, 3-4 HR TAT - Swab, Nasopharynx    Specimen: Nasopharynx; Swab   Result Value Ref Range    ADENOVIRUS, PCR Not Detected Not Detected    Coronavirus 229E Not Detected Not Detected    Coronavirus HKU1 Not Detected Not Detected    Coronavirus NL63 Not Detected Not Detected    Coronavirus OC43 Not Detected Not Detected    COVID19 Not Detected Not Detected - Ref. Range    Human Metapneumovirus Not Detected Not Detected    Human Rhinovirus/Enterovirus Not Detected Not Detected    Influenza A PCR Not Detected Not Detected    Influenza B PCR Not Detected Not Detected    Parainfluenza Virus 1 Not Detected " Not Detected    Parainfluenza Virus 2 Not Detected Not Detected    Parainfluenza Virus 3 Not Detected Not Detected    Parainfluenza Virus 4 Not Detected Not Detected    RSV, PCR Not Detected Not Detected    Bordetella pertussis pcr Not Detected Not Detected    Bordetella parapertussis PCR Not Detected Not Detected    Chlamydophila pneumoniae PCR Not Detected Not Detected    Mycoplasma pneumo by PCR Not Detected Not Detected   Blood Culture With GONZÁLEZ - Blood, Hand, Right    Specimen: Hand, Right; Blood   Result Value Ref Range    Blood Culture No growth at 5 days    Blood Culture With GONZÁLEZ - Blood, Hand, Left    Specimen: Hand, Left; Blood   Result Value Ref Range    Blood Culture No growth at 5 days    Comprehensive Metabolic Panel    Specimen: Blood   Result Value Ref Range    Glucose 168 (H) 65 - 99 mg/dL    BUN 11 8 - 23 mg/dL    Creatinine 0.56 (L) 0.76 - 1.27 mg/dL    Sodium 143 136 - 145 mmol/L    Potassium 3.4 (L) 3.5 - 5.2 mmol/L    Chloride 109 (H) 98 - 107 mmol/L    CO2 25.2 22.0 - 29.0 mmol/L    Calcium 7.0 (L) 8.6 - 10.5 mg/dL    Total Protein 5.3 (L) 6.0 - 8.5 g/dL    Albumin 2.50 (L) 3.50 - 5.20 g/dL    ALT (SGPT) 18 1 - 41 U/L    AST (SGOT) 23 1 - 40 U/L    Alkaline Phosphatase 65 39 - 117 U/L    Total Bilirubin 0.2 0.0 - 1.2 mg/dL    Globulin 2.8 gm/dL    A/G Ratio 0.9 g/dL    BUN/Creatinine Ratio 19.6 7.0 - 25.0    Anion Gap 8.8 5.0 - 15.0 mmol/L    eGFR 106.0 >60.0 mL/min/1.73   Lactic Acid, Plasma    Specimen: Blood   Result Value Ref Range    Lactate 2.4 (C) 0.5 - 2.0 mmol/L   BNP    Specimen: Blood   Result Value Ref Range    proBNP 147.4 0.0 - 900.0 pg/mL   CBC Auto Differential    Specimen: Blood   Result Value Ref Range    WBC 16.53 (H) 3.40 - 10.80 10*3/mm3    RBC 4.32 4.14 - 5.80 10*6/mm3    Hemoglobin 13.6 13.0 - 17.7 g/dL    Hematocrit 41.8 37.5 - 51.0 %    MCV 96.8 79.0 - 97.0 fL    MCH 31.5 26.6 - 33.0 pg    MCHC 32.5 31.5 - 35.7 g/dL    RDW 13.5 12.3 - 15.4 %    RDW-SD 48.7 37.0 - 54.0  fl    MPV 9.9 6.0 - 12.0 fL    Platelets 415 140 - 450 10*3/mm3    Neutrophil % 63.7 42.7 - 76.0 %    Lymphocyte % 24.6 19.6 - 45.3 %    Monocyte % 5.4 5.0 - 12.0 %    Eosinophil % 1.6 0.3 - 6.2 %    Basophil % 0.5 0.0 - 1.5 %    Immature Grans % 4.2 (H) 0.0 - 0.5 %    Neutrophils, Absolute 10.53 (H) 1.70 - 7.00 10*3/mm3    Lymphocytes, Absolute 4.06 (H) 0.70 - 3.10 10*3/mm3    Monocytes, Absolute 0.90 0.10 - 0.90 10*3/mm3    Eosinophils, Absolute 0.26 0.00 - 0.40 10*3/mm3    Basophils, Absolute 0.09 0.00 - 0.20 10*3/mm3    Immature Grans, Absolute 0.69 (H) 0.00 - 0.05 10*3/mm3    nRBC 0.0 0.0 - 0.2 /100 WBC   Procalcitonin    Specimen: Blood   Result Value Ref Range    Procalcitonin 0.03 0.00 - 0.25 ng/mL   Blood Gas, Arterial With Co-Ox    Specimen: Arterial Blood   Result Value Ref Range    Site Right Brachial     Richard's Test N/A     pH, Arterial 7.195 (C) 7.350 - 7.450 pH units    pCO2, Arterial 85.7 (C) 35.0 - 45.0 mm Hg    pO2, Arterial 94.9 75.0 - 100.0 mm Hg    HCO3, Arterial 33.1 (H) 22.0 - 28.0 mmol/L    Base Excess, Arterial 2.1 (H) 0.0 - 2.0 mmol/L    O2 Saturation, Arterial 95.0 94.0 - 100.0 %    Hematocrit, Blood Gas 42.8 %    Oxyhemoglobin 94.1 94 - 99 %    Methemoglobin 0.40 0.00 - 1.50 %    Carboxyhemoglobin <0.7 0 - 2 %    A-a Gradiant 503.5 mmHg    Barometric Pressure for Blood Gas 731 mmHg    Modality Ventilator     FIO2 100 %    Ventilator Mode NA     Note      Collected by [id number]     pH, Temp Corrected      pCO2, Temperature Corrected      pO2, Temperature Corrected     Troponin    Specimen: Blood   Result Value Ref Range    Troponin T <0.010 0.000 - 0.030 ng/mL   Urinalysis With Culture If Indicated - Urine, Catheter    Specimen: Urine, Catheter   Result Value Ref Range    Color, UA Yellow Yellow, Straw    Appearance, UA Cloudy (A) Clear    pH, UA <=5.0 5.0 - 8.0    Specific Gravity, UA 1.022 1.005 - 1.030    Glucose, UA Negative Negative    Ketones, UA Negative Negative    Bilirubin, UA  Negative Negative    Blood, UA Trace (A) Negative    Protein, UA 30 mg/dL (1+) (A) Negative    Leuk Esterase, UA Negative Negative    Nitrite, UA Negative Negative    Urobilinogen, UA 1.0 E.U./dL 0.2 - 1.0 E.U./dL   Urinalysis, Microscopic Only - Urine, Catheter    Specimen: Urine, Catheter   Result Value Ref Range    RBC, UA 3-5 (A) None Seen /HPF    WBC, UA 3-5 (A) None Seen /HPF    Bacteria, UA 1+ (A) None Seen /HPF    Squamous Epithelial Cells, UA 0-2 None Seen, 0-2 /HPF    Hyaline Casts, UA None Seen None Seen /LPF    Methodology Manual Light Microscopy    STAT Lactic Acid, Reflex    Specimen: Blood, Central Line   Result Value Ref Range    Lactate 1.1 0.5 - 2.0 mmol/L   Magnesium    Specimen: Blood   Result Value Ref Range    Magnesium 1.6 1.6 - 2.4 mg/dL   Blood Gas, Arterial With Co-Ox    Specimen: Arterial Blood   Result Value Ref Range    Site Femoral Artery     Richard's Test N/A     pH, Arterial 7.229 (C) 7.350 - 7.450 pH units    pCO2, Arterial 75.1 (C) 35.0 - 45.0 mm Hg    pO2, Arterial 280.0 (H) 75.0 - 100.0 mm Hg    HCO3, Arterial 31.3 (H) 22.0 - 28.0 mmol/L    Base Excess, Arterial 1.6 0.0 - 2.0 mmol/L    O2 Saturation, Arterial 99.7 94.0 - 100.0 %    Hematocrit, Blood Gas 40.6 %    Oxyhemoglobin 98.8 94 - 99 %    Methemoglobin 0.50 0.00 - 1.50 %    Carboxyhemoglobin <0.7 0 - 2 %    A-a Gradiant 329.4 mmHg    Barometric Pressure for Blood Gas 732 mmHg    Modality N/A     FIO2 100 %    Ventilator Mode NA     Note      Collected by an     pH, Temp Corrected      pCO2, Temperature Corrected      pO2, Temperature Corrected     Green Top (Gel)   Result Value Ref Range    Extra Tube Hold for add-ons.    Lavender Top   Result Value Ref Range    Extra Tube hold for add-on    Gold Top - SST   Result Value Ref Range    Extra Tube Hold for add-ons.    Light Blue Top   Result Value Ref Range    Extra Tube hold for add-on         Problems Addressed this Visit        Cardiac and Vasculature    Essential  hypertension     Hypertension is unchanged.  Continue current treatment regimen.  Blood pressure will be reassessed at the next regular appointment.           Relevant Medications    doxazosin (CARDURA) 4 MG tablet       Endocrine and Metabolic    Hypogonadism in male     Patient would like to restart testosterone injections.  I have refilled the medication and instructed him to contact his pulmonologist and cardiologist to get clearance before restarting this medication.           Relevant Medications    Testosterone Cypionate (DEPOTESTOTERONE CYPIONATE) 200 MG/ML injection       Skin    Dermatitis of external ear     Chronic and stable.  Patient was given refill on triamcinolone cream.           Relevant Medications    triamcinolone (KENALOG) 0.5 % cream      Diagnoses       Codes Comments    Essential hypertension     ICD-10-CM: I10  ICD-9-CM: 401.9     Hypogonadism in male     ICD-10-CM: E29.1  ICD-9-CM: 257.2     Dermatitis of external ear     ICD-10-CM: L30.9  ICD-9-CM: 692.9           Return in about 3 months (around 7/21/2022) for Follow-up anxiety, afib, HTN.    I attest that I have reviewed the student note and that the components of the history of the present illness, the physical exam, and the assessment and plan documented were performed by me or were performed in my presence by the student and verified by me.    31 minutes was spent on this patient encounter which included history taking, physical exam, answering patient questions, counseling, discussing treatment plan, placing orders, and documentation.    Rory Ayers MD   4/24/2022

## 2022-04-24 NOTE — ASSESSMENT & PLAN NOTE
Patient would like to restart testosterone injections.  I have refilled the medication and instructed him to contact his pulmonologist and cardiologist to get clearance before restarting this medication.

## 2022-05-18 ENCOUNTER — TELEPHONE (OUTPATIENT)
Dept: FAMILY MEDICINE CLINIC | Facility: CLINIC | Age: 72
End: 2022-05-18

## 2022-05-18 NOTE — TELEPHONE ENCOUNTER
CHAPIS WITH [facility] HAS CALLED TO REQUEST A VERBAL ORDER TO MOVE PHYSICAL THERAPY RE ASSESMENT TO THIS WEEK. CHAPIS IS REQUESTING REMOVAL OF LAST WEEKS VISIT AND REQUESTING  VERBAL ORDER TO VISIT THIS WEEK.    CALL BACK NUMBER [phone]

## 2022-05-26 NOTE — TELEPHONE ENCOUNTER
I did get in touch with Daniel just today, I told him verbal orders for this were okay with provider, he said patient just yesterday was discharged as he is no longer homebound.

## 2022-06-20 DIAGNOSIS — I10 ESSENTIAL HYPERTENSION: ICD-10-CM

## 2022-06-21 RX ORDER — DOXAZOSIN MESYLATE 4 MG/1
8 TABLET ORAL DAILY
Qty: 180 TABLET | Refills: 3 | Status: SHIPPED | OUTPATIENT
Start: 2022-06-21 | End: 2022-09-26

## 2022-08-01 ENCOUNTER — OFFICE VISIT (OUTPATIENT)
Dept: FAMILY MEDICINE CLINIC | Facility: CLINIC | Age: 72
End: 2022-08-01

## 2022-08-01 VITALS
OXYGEN SATURATION: 98 % | HEIGHT: 68 IN | TEMPERATURE: 97.8 F | SYSTOLIC BLOOD PRESSURE: 120 MMHG | WEIGHT: 192 LBS | BODY MASS INDEX: 29.1 KG/M2 | HEART RATE: 56 BPM | DIASTOLIC BLOOD PRESSURE: 60 MMHG

## 2022-08-01 DIAGNOSIS — L30.9 DERMATITIS OF EXTERNAL EAR: ICD-10-CM

## 2022-08-01 DIAGNOSIS — E29.1 HYPOGONADISM IN MALE: ICD-10-CM

## 2022-08-01 DIAGNOSIS — M79.89 MASS OF SOFT TISSUE: Primary | ICD-10-CM

## 2022-08-01 PROCEDURE — 99214 OFFICE O/P EST MOD 30 MIN: CPT | Performed by: FAMILY MEDICINE

## 2022-08-01 RX ORDER — TRIAMCINOLONE ACETONIDE 5 MG/G
1 CREAM TOPICAL 2 TIMES DAILY
Qty: 30 G | Refills: 3 | Status: SHIPPED | OUTPATIENT
Start: 2022-08-01

## 2022-08-01 RX ORDER — DOXYCYCLINE HYCLATE 100 MG/1
CAPSULE ORAL
COMMUNITY
Start: 2022-05-19 | End: 2022-11-01

## 2022-08-01 RX ORDER — HYDROXYZINE HYDROCHLORIDE 25 MG/1
25 TABLET, FILM COATED ORAL EVERY 6 HOURS PRN
COMMUNITY
Start: 2022-07-05 | End: 2022-08-08

## 2022-08-01 NOTE — PROGRESS NOTES
Rey Garrison Jr. is a 72 y.o. male who presents today for Mass      Patient was seen by cariology today and amiodarone was discontinued. He has a mass under his right arm that he noticed about a month ago. He does not think is has changes in size. No drainage. It is not tender and has no overlying skin changes. He denies nausea, vomiting, fever, chills, CP, palpitations, arm weakness, or edema. No recent vaccines, last covid shot was in May. Patient had recent pet scan that did not show any abnormalities performed at [place].  Patient was told by the specialist to hold testosterone injection so he has not been taking them for the past couple months.  He has been cleared to restart them and needs recertification through insurance with a testosterone level update date.  Patient had significant improvement in symptoms with testosterone injections but since discontinuing them he feels like he is a decrease in muscle mass.  Patient has dermatitis of the ears which is currently being treated with triamcinolone cream.  Patient needs refill on this today.     The following portions of the patient's history were reviewed and updated as appropriate: allergies, current medications, past family history, past medical history, past social history, past surgical history and problem list.    Current Outpatient Medications on File Prior to Visit   Medication Sig Dispense Refill   • albuterol sulfate  (90 Base) MCG/ACT inhaler Inhale 2 puffs Every 6 (Six) Hours As Needed for Wheezing. 8.5 g 2   • apixaban (ELIQUIS) 5 MG tablet tablet 1 tablet Every 12 (Twelve) Hours. 60 tablet    • Cholecalciferol (Vitamin D3) 1.25 MG (68963 UT) capsule 50 mcg.     • doxazosin (CARDURA) 4 MG tablet Take 2 tablets by mouth Daily. 180 tablet 3   • doxycycline (VIBRAMYCIN) 100 MG capsule TAKE 1 CAPSULE EVERY 12 HOURS DAILY BY MOUTH FOR 5 DAYS     • famotidine (PEPCID) 20 MG tablet 20 mg.     • finasteride (PROSCAR) 5 MG tablet TAKE 1 TABLET  "BY MOUTH EVERY DAY 90 tablet 3   • Fluticasone-Umeclidin-Vilant (Trelegy Ellipta) 200-62.5-25 MCG/INH inhaler Inhale 1 puff Daily. 60 each 11   • hydrOXYzine (ATARAX) 25 MG tablet Take 25 mg by mouth Every 6 (Six) Hours As Needed. for anxiety     • ketoconazole (NIZORAL) 2 % cream Apply to appropriate area twice daily 45 g 5   • Multiple Vitamin (MULTI-VITAMIN DAILY PO) Take 1 tablet by mouth Daily.     • sildenafil (VIAGRA) 100 MG tablet TAKE 1 TABLET BY MOUTH DAILY AS NEEDED FOR ERECTILE DYSFUNCTION. 15 tablet 5   • Testosterone Cypionate (DEPOTESTOTERONE CYPIONATE) 200 MG/ML injection Inject 1.5 ml IM every 14 days 2 mL 1   • venlafaxine XR (EFFEXOR-XR) 37.5 MG 24 hr capsule Take 1 capsule by mouth Daily. 30 capsule 5   • [DISCONTINUED] amiodarone (PACERONE) 200 MG tablet 2 (Two) Times a Day.     • [DISCONTINUED] oxyCODONE (ROXICODONE) 5 MG immediate release tablet      • [DISCONTINUED] triamcinolone (KENALOG) 0.5 % cream Apply 1 application topically to the appropriate area as directed 2 (Two) Times a Day. 30 g 3     No current facility-administered medications on file prior to visit.       Allergies   Allergen Reactions   • Alprazolam Hallucinations   • Penicillin G Unknown - Low Severity   • Sulfamethoxazole-Trimethoprim Hallucinations   • Penicillamine Unknown (See Comments)     As a child    • Penicillins Itching     Causes both the hands and the feet to itch    • Sulfa Antibiotics Itching     Causes both the hands and the feet to itch         Visit Vitals  /60   Pulse 56   Temp 97.8 °F (36.6 °C)   Ht 172.7 cm (68\")   Wt 87.1 kg (192 lb)   SpO2 98%   BMI 29.19 kg/m²        Physical Exam  Constitutional:       General: He is not in acute distress.     Appearance: He is well-developed. He is not diaphoretic.   HENT:      Head: Atraumatic.      Right Ear: Hearing and tympanic membrane normal. No middle ear effusion.      Left Ear: Hearing, tympanic membrane, ear canal and external ear normal.  No middle " ear effusion.      Ears:      Comments: Mild erythema with dry skin of the left external ear and ear canal.  Cardiovascular:      Rate and Rhythm: Normal rate and regular rhythm.      Heart sounds: Normal heart sounds. No murmur heard.    No friction rub. No gallop.   Pulmonary:      Effort: Pulmonary effort is normal. No respiratory distress.      Breath sounds: Normal breath sounds. No stridor. No wheezing, rhonchi or rales.   Musculoskeletal:      Cervical back: Normal range of motion and neck supple.   Skin:     General: Skin is warm and dry.      Comments: Firm rubbery mobile mass approx 3cm in diameter with no tenderness or overlying skin changes.    Neurological:      Mental Status: He is alert and oriented to person, place, and time.   Psychiatric:         Behavior: Behavior normal.               Problems Addressed this Visit        Endocrine and Metabolic    Hypogonadism in male     Patient has not been taking testosterone injections recently because he was discharged to hold these by the specialist he is seeing but they have cleared him to restart.  He thinks he needs to be reauthorized through his insurance needs testosterone checked.  He will return before 10 AM and fasting to check testosterone levels.  Once the results return we will send an order for his testosterone injections.         Relevant Orders    Testosterone       Musculoskeletal and Injuries    Mass of soft tissue - Primary     Firm mobile nontender mass with no overlying skin abnormalities in the right axilla.  There is some increase in tissue in the left axilla but no mass could be palpated.  Order ultrasound of the right axilla for further evaluation.  RTC/ED precautions given.         Relevant Orders    US nonvascular extremity limited       Skin    Dermatitis of external ear     Patient has had some improvement of symptoms with triamcinolone cream.  He needs refill today.         Relevant Medications    triamcinolone (KENALOG) 0.5 %  cream      Diagnoses       Codes Comments    Mass of soft tissue    -  Primary ICD-10-CM: M79.89  ICD-9-CM: 729.99     Hypogonadism in male     ICD-10-CM: E29.1  ICD-9-CM: 257.2     Dermatitis of external ear     ICD-10-CM: L30.9  ICD-9-CM: 692.9           Return in about 3 months (around 11/1/2022) for Medicare Wellness.    Rory Ayers MD   8/1/2022

## 2022-08-01 NOTE — ASSESSMENT & PLAN NOTE
Firm mobile nontender mass with no overlying skin abnormalities in the right axilla.  There is some increase in tissue in the left axilla but no mass could be palpated.  Order ultrasound of the right axilla for further evaluation.  RTC/ED precautions given.

## 2022-08-01 NOTE — ASSESSMENT & PLAN NOTE
Patient has not been taking testosterone injections recently because he was discharged to hold these by the specialist he is seeing but they have cleared him to restart.  He thinks he needs to be reauthorized through his insurance needs testosterone checked.  He will return before 10 AM and fasting to check testosterone levels.  Once the results return we will send an order for his testosterone injections.

## 2022-08-02 ENCOUNTER — LAB (OUTPATIENT)
Dept: LAB | Facility: HOSPITAL | Age: 72
End: 2022-08-02

## 2022-08-02 DIAGNOSIS — E29.1 HYPOGONADISM IN MALE: ICD-10-CM

## 2022-08-02 LAB — TESTOST SERPL-MCNC: 482 NG/DL (ref 193–740)

## 2022-08-02 PROCEDURE — 84403 ASSAY OF TOTAL TESTOSTERONE: CPT

## 2022-08-02 PROCEDURE — 36415 COLL VENOUS BLD VENIPUNCTURE: CPT

## 2022-08-03 RX ORDER — FINASTERIDE 5 MG/1
5 TABLET, FILM COATED ORAL DAILY
Qty: 90 TABLET | Refills: 3 | Status: SHIPPED | OUTPATIENT
Start: 2022-08-03

## 2022-08-03 NOTE — TELEPHONE ENCOUNTER
Caller: JEFFREY Bettencourt call back number:  [phone]     Requested Prescriptions:   Requested Prescriptions     Pending Prescriptions Disp Refills   • finasteride (PROSCAR) 5 MG tablet 90 tablet 3     Sig: Take 1 tablet by mouth Daily.        Pharmacy where request should be sent: [facility]/PHARMACY #[id number] [place], [place] - [address] [phone] [facility] [phone] FX     Additional details provided by patient:     Does the patient have less than a 3 day supply:  [] Yes  [x] No

## 2022-08-05 ENCOUNTER — TELEPHONE (OUTPATIENT)
Dept: FAMILY MEDICINE CLINIC | Facility: CLINIC | Age: 72
End: 2022-08-05

## 2022-08-05 DIAGNOSIS — E29.1 HYPOGONADISM IN MALE: Primary | ICD-10-CM

## 2022-08-05 NOTE — TELEPHONE ENCOUNTER
Caller: Rey Garrison Jr.    Relationship: Self    Best call back number: [phone]    Caller requesting test results: PATIENT    What test was performed: TESTOSTERONE    When was the test performed: 08/03/22    Where was the test performed: [facility]     Additional notes: PATIENT WANTED SOME CLARIFICATION ON HIS TESTOSTERONE TEST RESULTS     PLEASE CONTACT PATIENT TO ADVISE OF RESULTS

## 2022-08-05 NOTE — TELEPHONE ENCOUNTER
Order has been placed.  Please make sure the patient comes in before 10 AM and fasting to have testosterone level checked.

## 2022-08-05 NOTE — TELEPHONE ENCOUNTER
Called pt. Pt said that maybe the test is wrong because how could his 1st test be so low and now its normal.     Please advise

## 2022-08-06 DIAGNOSIS — F41.1 GENERALIZED ANXIETY DISORDER: ICD-10-CM

## 2022-08-06 NOTE — TELEPHONE ENCOUNTER
Rx Refill Note  Requested Prescriptions     Pending Prescriptions Disp Refills   • hydrOXYzine (ATARAX) 25 MG tablet [Pharmacy Med Name: HYDROXYZINE HCL 25 MG TABLET] 100 tablet 5     Sig: TAKE 1 TABLET BY MOUTH EVERY 6 HOURS AS NEEDED FOR ANXIETY.      Last office visit with prescribing clinician: 8/1/2022      Next office visit with prescribing clinician: 11/1/2022            Viktoriya López LPN  08/06/22, 11:07 EDT

## 2022-08-08 RX ORDER — HYDROXYZINE HYDROCHLORIDE 25 MG/1
TABLET, FILM COATED ORAL
Qty: 100 TABLET | Refills: 5 | Status: SHIPPED | OUTPATIENT
Start: 2022-08-08 | End: 2022-11-01

## 2022-08-17 DIAGNOSIS — E29.1 HYPOGONADISM IN MALE: ICD-10-CM

## 2022-08-17 RX ORDER — TESTOSTERONE CYPIONATE 200 MG/ML
INJECTION, SOLUTION INTRAMUSCULAR
Qty: 2 ML | Refills: 1 | Status: SHIPPED | OUTPATIENT
Start: 2022-08-17 | End: 2022-09-27 | Stop reason: SDUPTHER

## 2022-08-17 NOTE — TELEPHONE ENCOUNTER
Caller: Rey Garrison Jr.    Relationship: Self    Best call back number: [phone]    Requested Prescriptions:   Requested Prescriptions     Pending Prescriptions Disp Refills   • Testosterone Cypionate (DEPOTESTOTERONE CYPIONATE) 200 MG/ML injection 2 mL 1     Sig: Inject 1.5 ml IM every 14 days        Pharmacy where request should be sent: [facility]/PHARMACY #[id number] - [address] [phone] [facility] [phone] FX     Additional details provided by patient: patient is completely out    Does the patient have less than a 3 day supply:  [x] Yes  [] No    Remberto Butt Rep   08/17/22 12:33 EDT

## 2022-08-17 NOTE — TELEPHONE ENCOUNTER
Rx Refill Note  Requested Prescriptions     Pending Prescriptions Disp Refills   • Testosterone Cypionate (DEPOTESTOTERONE CYPIONATE) 200 MG/ML injection 2 mL 1     Sig: Inject 1.5 ml IM every 14 days      Last office visit with prescribing clinician: 8/1/2022      Next office visit with prescribing clinician: 11/1/2022            Viktoriya López LPN  08/17/22, 12:41 EDT

## 2022-08-18 ENCOUNTER — HOSPITAL ENCOUNTER (OUTPATIENT)
Dept: ULTRASOUND IMAGING | Facility: HOSPITAL | Age: 72
Discharge: HOME | End: 2022-08-18
Admitting: FAMILY MEDICINE

## 2022-08-18 DIAGNOSIS — M79.89 MASS OF SOFT TISSUE: ICD-10-CM

## 2022-08-18 PROCEDURE — 76882 US LMTD JT/FCL EVL NVASC XTR: CPT

## 2022-08-19 ENCOUNTER — APPOINTMENT (OUTPATIENT)
Dept: ULTRASOUND IMAGING | Facility: HOSPITAL | Age: 72
End: 2022-08-19

## 2022-09-14 ENCOUNTER — HOSPITAL ENCOUNTER (EMERGENCY)
Facility: HOSPITAL | Age: 72
Discharge: HOME | End: 2022-09-14
Attending: EMERGENCY MEDICINE | Admitting: EMERGENCY MEDICINE

## 2022-09-14 ENCOUNTER — APPOINTMENT (OUTPATIENT)
Dept: GENERAL RADIOLOGY | Facility: HOSPITAL | Age: 72
End: 2022-09-14

## 2022-09-14 ENCOUNTER — APPOINTMENT (OUTPATIENT)
Dept: CT IMAGING | Facility: HOSPITAL | Age: 72
End: 2022-09-14

## 2022-09-14 VITALS
TEMPERATURE: 98.4 F | HEIGHT: 68 IN | OXYGEN SATURATION: 96 % | RESPIRATION RATE: 16 BRPM | SYSTOLIC BLOOD PRESSURE: 122 MMHG | WEIGHT: 192 LBS | HEART RATE: 60 BPM | DIASTOLIC BLOOD PRESSURE: 68 MMHG | BODY MASS INDEX: 29.1 KG/M2

## 2022-09-14 DIAGNOSIS — J40 BRONCHITIS: Primary | ICD-10-CM

## 2022-09-14 DIAGNOSIS — R04.2 HEMOPTYSIS: ICD-10-CM

## 2022-09-14 LAB
ALBUMIN SERPL-MCNC: 4.3 G/DL (ref 3.5–5.2)
ALBUMIN/GLOB SERPL: 1.8 G/DL
ALP SERPL-CCNC: 63 U/L (ref 39–117)
ALT SERPL W P-5'-P-CCNC: 19 U/L (ref 1–41)
ANION GAP SERPL CALCULATED.3IONS-SCNC: 10.4 MMOL/L (ref 5–15)
AST SERPL-CCNC: 20 U/L (ref 1–40)
BASOPHILS # BLD AUTO: 0.04 10*3/MM3 (ref 0–0.2)
BASOPHILS NFR BLD AUTO: 0.5 % (ref 0–1.5)
BILIRUB SERPL-MCNC: 0.2 MG/DL (ref 0–1.2)
BUN SERPL-MCNC: 21 MG/DL (ref 8–23)
BUN/CREAT SERPL: 25.9 (ref 7–25)
CALCIUM SPEC-SCNC: 9.6 MG/DL (ref 8.6–10.5)
CHLORIDE SERPL-SCNC: 104 MMOL/L (ref 98–107)
CO2 SERPL-SCNC: 26.6 MMOL/L (ref 22–29)
CREAT SERPL-MCNC: 0.81 MG/DL (ref 0.76–1.27)
DEPRECATED RDW RBC AUTO: 53.5 FL (ref 37–54)
EGFRCR SERPLBLD CKD-EPI 2021: 94.8 ML/MIN/1.73
EOSINOPHIL # BLD AUTO: 0.1 10*3/MM3 (ref 0–0.4)
EOSINOPHIL NFR BLD AUTO: 1.3 % (ref 0.3–6.2)
ERYTHROCYTE [DISTWIDTH] IN BLOOD BY AUTOMATED COUNT: 15.7 % (ref 12.3–15.4)
GLOBULIN UR ELPH-MCNC: 2.4 GM/DL
GLUCOSE SERPL-MCNC: 147 MG/DL (ref 65–99)
HCT VFR BLD AUTO: 47.4 % (ref 37.5–51)
HGB BLD-MCNC: 15.6 G/DL (ref 13–17.7)
HOLD SPECIMEN: NORMAL
HOLD SPECIMEN: NORMAL
IMM GRANULOCYTES # BLD AUTO: 0.05 10*3/MM3 (ref 0–0.05)
IMM GRANULOCYTES NFR BLD AUTO: 0.7 % (ref 0–0.5)
LYMPHOCYTES # BLD AUTO: 1.57 10*3/MM3 (ref 0.7–3.1)
LYMPHOCYTES NFR BLD AUTO: 20.7 % (ref 19.6–45.3)
MCH RBC QN AUTO: 30.5 PG (ref 26.6–33)
MCHC RBC AUTO-ENTMCNC: 32.9 G/DL (ref 31.5–35.7)
MCV RBC AUTO: 92.6 FL (ref 79–97)
MONOCYTES # BLD AUTO: 0.55 10*3/MM3 (ref 0.1–0.9)
MONOCYTES NFR BLD AUTO: 7.3 % (ref 5–12)
NEUTROPHILS NFR BLD AUTO: 5.27 10*3/MM3 (ref 1.7–7)
NEUTROPHILS NFR BLD AUTO: 69.5 % (ref 42.7–76)
NRBC BLD AUTO-RTO: 0 /100 WBC (ref 0–0.2)
NT-PROBNP SERPL-MCNC: 48 PG/ML (ref 0–900)
PLATELET # BLD AUTO: 180 10*3/MM3 (ref 140–450)
PMV BLD AUTO: 9.5 FL (ref 6–12)
POTASSIUM SERPL-SCNC: 3.9 MMOL/L (ref 3.5–5.2)
PROT SERPL-MCNC: 6.7 G/DL (ref 6–8.5)
RBC # BLD AUTO: 5.12 10*6/MM3 (ref 4.14–5.8)
SODIUM SERPL-SCNC: 141 MMOL/L (ref 136–145)
TROPONIN T SERPL-MCNC: <0.01 NG/ML (ref 0–0.03)
WBC NRBC COR # BLD: 7.58 10*3/MM3 (ref 3.4–10.8)
WHOLE BLOOD HOLD COAG: NORMAL
WHOLE BLOOD HOLD SPECIMEN: NORMAL

## 2022-09-14 PROCEDURE — 71045 X-RAY EXAM CHEST 1 VIEW: CPT

## 2022-09-14 PROCEDURE — 99284 EMERGENCY DEPT VISIT MOD MDM: CPT

## 2022-09-14 PROCEDURE — 25010000002 IOPAMIDOL 61 % SOLUTION: Performed by: EMERGENCY MEDICINE

## 2022-09-14 PROCEDURE — 83880 ASSAY OF NATRIURETIC PEPTIDE: CPT | Performed by: PHYSICIAN ASSISTANT

## 2022-09-14 PROCEDURE — 71275 CT ANGIOGRAPHY CHEST: CPT

## 2022-09-14 PROCEDURE — 85025 COMPLETE CBC W/AUTO DIFF WBC: CPT | Performed by: PHYSICIAN ASSISTANT

## 2022-09-14 PROCEDURE — 84484 ASSAY OF TROPONIN QUANT: CPT | Performed by: PHYSICIAN ASSISTANT

## 2022-09-14 PROCEDURE — 93005 ELECTROCARDIOGRAM TRACING: CPT

## 2022-09-14 PROCEDURE — 80053 COMPREHEN METABOLIC PANEL: CPT | Performed by: PHYSICIAN ASSISTANT

## 2022-09-14 RX ORDER — AZITHROMYCIN 250 MG/1
250 TABLET, FILM COATED ORAL DAILY
Qty: 6 TABLET | Refills: 0 | Status: SHIPPED | OUTPATIENT
Start: 2022-09-14 | End: 2022-11-01

## 2022-09-14 RX ORDER — SODIUM CHLORIDE 0.9 % (FLUSH) 0.9 %
10 SYRINGE (ML) INJECTION AS NEEDED
Status: DISCONTINUED | OUTPATIENT
Start: 2022-09-14 | End: 2022-09-14 | Stop reason: HOSPADM

## 2022-09-14 RX ADMIN — IOPAMIDOL 100 ML: 612 INJECTION, SOLUTION INTRAVENOUS at 16:51

## 2022-09-14 NOTE — ED PROVIDER NOTES
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  Patient states that he a coughing fit 2 hours ago and noticed blood-tinged sputum.  He states that he had a PET scan 2 months ago which was normal.  He states that he has a follow-up with surgery in the middle of October with planned CT scan at that time.  He denies shortness of breath, chest pain, nausea/vomiting, or any other symptoms at this time.  Patient also reports that he does smoke marijuana almost daily    History provided by:  Patient   used: No        Review of Systems   Respiratory:        Hemoptysis    All other systems reviewed and are negative.      Past Medical History:   Diagnosis Date   • Benign prostatic hypertrophy    • COPD (chronic obstructive pulmonary disease) (HCC)    • Coughing up blood     History of   • Depression with anxiety    • Dermatitis    • Hearing loss    • Hiatal hernia    • History of acute pharyngitis    • History of histoplasmosis    • Hypertension    • Hypogonadism male    • Left inguinal hernia    • Mediastinal lymphadenopathy    • Sciatica of right side    • Tobacco abuse    • Type 2 diabetes mellitus (HCC)        Allergies   Allergen Reactions   • Alprazolam Hallucinations   • Penicillin G Unknown - Low Severity   • Sulfamethoxazole-Trimethoprim Hallucinations   • Penicillamine Unknown (See Comments)     As a child    • Penicillins Itching     Causes both the hands and the feet to itch    • Sulfa Antibiotics Itching     Causes both the hands and the feet to itch        Past Surgical History:   Procedure Laterality Date   • ANTERIOR CERVICAL DISCECTOMY W/ FUSION Bilateral 10/19/2018    Procedure: CERVICAL DISCECTOMY ANTERIOR WITH FUSION C5-7;  Surgeon: Ata Stoddard MD;   Location: [facility] OR;  Service: Neurosurgery   • COLONOSCOPY W/ POLYPECTOMY      Pathology consistent with hyperplastic polyp   • HERNIA REPAIR     • INGUINAL HERNIA REPAIR     • KNEE ARTHROSCOPY W/ MENISCAL REPAIR      Medial and lateral meniscus repair   • NASAL POLYP EXCISION         Family History   Problem Relation Age of Onset   • Dementia Mother    • Prostate cancer Father        Social History     Socioeconomic History   • Marital status:    Tobacco Use   • Smoking status: Former Smoker     Packs/day: 0.50     Years: 1.00     Pack years: 0.50     Quit date: 3/3/2022     Years since quittin.5   • Smokeless tobacco: Never Used   Vaping Use   • Vaping Use: Unknown   Substance and Sexual Activity   • Alcohol use: Yes     Alcohol/week: 5.0 standard drinks     Types: 5 Standard drinks or equivalent per week     Comment: 5 alcoholic beverages per week   • Drug use: Yes     Types: Marijuana     Comment: social use; last use 10-17-18   • Sexual activity: Yes     Partners: Male           Objective   Physical Exam  Vitals and nursing note reviewed.   Constitutional:       Appearance: He is well-developed.   HENT:      Head: Normocephalic and atraumatic.      Mouth/Throat:      Mouth: Mucous membranes are moist.   Eyes:      Extraocular Movements: Extraocular movements intact.   Cardiovascular:      Rate and Rhythm: Normal rate and regular rhythm.   Pulmonary:      Effort: Pulmonary effort is normal.      Breath sounds: Examination of the right-lower field reveals decreased breath sounds. Decreased breath sounds present.   Abdominal:      Palpations: Abdomen is soft.   Musculoskeletal:         General: Normal range of motion.      Cervical back: Normal range of motion.   Skin:     General: Skin is warm and dry.   Neurological:      Mental Status: He is alert and oriented to person, place, and time.   Psychiatric:         Behavior: Behavior normal.         Thought Content: Thought content normal.          Judgment: Judgment normal.         Procedures           ED Course  ED Course as of 09/14/22 1819   Wed Sep 14, 2022   1404 EKG interpreted by me reveals sinus rhythm with rate of 63 bpm.  There is a long QT interval.  No obvious acute ischemic findings.  This is an abnormal appearing EKG. [TB]      ED Course User Index  [TB] Nicole Antonio MD                                           MDM  Number of Diagnoses or Management Options  Bronchitis: new and requires workup  Hemoptysis: new and requires workup     Amount and/or Complexity of Data Reviewed  Clinical lab tests: reviewed  Tests in the radiology section of CPT®: reviewed  Tests in the medicine section of CPT®: reviewed    Risk of Complications, Morbidity, and/or Mortality  Presenting problems: moderate  Diagnostic procedures: low  Management options: moderate    Patient Progress  Patient progress: stable      Final diagnoses:   Bronchitis   Hemoptysis       ED Disposition  ED Disposition     ED Disposition   Discharge    Condition   Stable    Comment   --             [facility] Emergency Department  [address] [address] [phone]    If symptoms worsen         Medication List      New Prescriptions    azithromycin 250 MG tablet  Commonly known as: ZITHROMAX  Take 1 tablet by mouth Daily. Take 2 tablets the first day, then 1 tablet daily for 4 days.           Where to Get Your Medications      These medications were sent to [facility]/pharmacy #[id number] - [place], [place] - [address] [place] [address] - [phone]  - [phone] [address] [address]    Phone: [phone]   · azithromycin 250 MG tablet          Enrique Rivera Jr., DARRELL  09/14/22 1819

## 2022-09-25 DIAGNOSIS — I10 ESSENTIAL HYPERTENSION: ICD-10-CM

## 2022-09-26 RX ORDER — DOXAZOSIN MESYLATE 4 MG/1
8 TABLET ORAL DAILY
Qty: 180 TABLET | Refills: 3 | Status: SHIPPED | OUTPATIENT
Start: 2022-09-26 | End: 2022-12-29

## 2022-09-27 DIAGNOSIS — E29.1 HYPOGONADISM IN MALE: ICD-10-CM

## 2022-09-27 DIAGNOSIS — J44.9 CHRONIC OBSTRUCTIVE PULMONARY DISEASE, UNSPECIFIED COPD TYPE: ICD-10-CM

## 2022-09-27 DIAGNOSIS — Z76.0 MEDICATION REFILL: ICD-10-CM

## 2022-09-27 RX ORDER — ALBUTEROL SULFATE 90 UG/1
2 AEROSOL, METERED RESPIRATORY (INHALATION) EVERY 6 HOURS PRN
Qty: 8.5 G | Refills: 2 | Status: SHIPPED | OUTPATIENT
Start: 2022-09-27 | End: 2022-09-27 | Stop reason: SDUPTHER

## 2022-09-27 RX ORDER — ALBUTEROL SULFATE 90 UG/1
2 AEROSOL, METERED RESPIRATORY (INHALATION) EVERY 6 HOURS PRN
Qty: 8.5 G | Refills: 2 | Status: SHIPPED | OUTPATIENT
Start: 2022-09-27 | End: 2023-01-20

## 2022-09-27 RX ORDER — TESTOSTERONE CYPIONATE 200 MG/ML
INJECTION, SOLUTION INTRAMUSCULAR
Qty: 2 ML | Refills: 1 | Status: SHIPPED | OUTPATIENT
Start: 2022-09-27 | End: 2022-11-01 | Stop reason: SDUPTHER

## 2022-09-27 NOTE — TELEPHONE ENCOUNTER
Caller: Rey Garrison Jr.    Relationship: Self    Best call back number:[phone]      Requested Prescriptions:   Requested Prescriptions     Pending Prescriptions Disp Refills   • albuterol sulfate  (90 Base) MCG/ACT inhaler 8.5 g 2     Sig: Inhale 2 puffs Every 6 (Six) Hours As Needed for Wheezing.   • Testosterone Cypionate (DEPOTESTOTERONE CYPIONATE) 200 MG/ML injection 2 mL 1     Sig: Inject 1.5 ml IM every 14 days        Pharmacy where request should be sent: [facility]/PHARMACY #[id number] - [place] [phone] [place] [phone]      Additional details provided by patient: PATIENT WOULD PRIOR AUTHORIZATION FOR TESTOSTERONE INJECTION AND WOULD NEED TO HAVE PROVIDER CALL INSURANCE TO MAKE THEM AWARE HE DOES NEED THIS    PLEASE ADVISE    Does the patient have less than a 3 day supply:  [x] Yes  [] No    Remberto Aguero Rep   09/27/22 10:22 EDT

## 2022-09-27 NOTE — TELEPHONE ENCOUNTER
Rx Refill Note  Requested Prescriptions     Pending Prescriptions Disp Refills   • albuterol sulfate  (90 Base) MCG/ACT inhaler 8.5 g 2     Sig: Inhale 2 puffs Every 6 (Six) Hours As Needed for Wheezing.   • Testosterone Cypionate (DEPOTESTOTERONE CYPIONATE) 200 MG/ML injection 2 mL 1     Sig: Inject 1.5 ml IM every 14 days      Last office visit with prescribing clinician: 8/1/2022      Next office visit with prescribing clinician: 11/1/2022            Donnie Shin MA  09/27/22, 10:30 EDT

## 2022-10-17 DIAGNOSIS — S46.811A STRAIN OF RIGHT TRAPEZIUS MUSCLE, INITIAL ENCOUNTER: ICD-10-CM

## 2022-10-18 RX ORDER — SILDENAFIL 100 MG/1
100 TABLET, FILM COATED ORAL DAILY PRN
Qty: 15 TABLET | Refills: 5 | Status: SHIPPED | OUTPATIENT
Start: 2022-10-18

## 2022-10-18 RX ORDER — TIZANIDINE 4 MG/1
TABLET ORAL
Qty: 270 TABLET | Refills: 0 | Status: SHIPPED | OUTPATIENT
Start: 2022-10-18 | End: 2023-03-20

## 2022-10-18 NOTE — TELEPHONE ENCOUNTER
Rx Refill Note  Requested Prescriptions     Pending Prescriptions Disp Refills   • tiZANidine (ZANAFLEX) 4 MG tablet [Pharmacy Med Name: TIZANIDINE HCL 4 MG TABLET] 270 tablet 0     Sig: TAKE 1 TABLET BY MOUTH 3 TIMES A DAY AS NEEDED FOR MUSCLE SPASMS.      Last office visit with prescribing clinician: 8/1/2022      Next office visit with prescribing clinician: 11/1/2022            Kati Ha MA  10/18/22, 08:27 EDT

## 2022-11-01 ENCOUNTER — LAB (OUTPATIENT)
Dept: LAB | Facility: HOSPITAL | Age: 72
End: 2022-11-01

## 2022-11-01 ENCOUNTER — OFFICE VISIT (OUTPATIENT)
Dept: FAMILY MEDICINE CLINIC | Facility: CLINIC | Age: 72
End: 2022-11-01

## 2022-11-01 VITALS
HEART RATE: 68 BPM | HEIGHT: 68 IN | DIASTOLIC BLOOD PRESSURE: 60 MMHG | OXYGEN SATURATION: 98 % | TEMPERATURE: 98 F | BODY MASS INDEX: 30.01 KG/M2 | SYSTOLIC BLOOD PRESSURE: 120 MMHG | WEIGHT: 198 LBS

## 2022-11-01 DIAGNOSIS — I10 ESSENTIAL HYPERTENSION: ICD-10-CM

## 2022-11-01 DIAGNOSIS — L21.9 SEBORRHEIC DERMATITIS: ICD-10-CM

## 2022-11-01 DIAGNOSIS — Z00.00 MEDICARE ANNUAL WELLNESS VISIT, SUBSEQUENT: Primary | ICD-10-CM

## 2022-11-01 DIAGNOSIS — R79.89 LOW TESTOSTERONE IN MALE: ICD-10-CM

## 2022-11-01 DIAGNOSIS — E29.1 HYPOGONADISM IN MALE: ICD-10-CM

## 2022-11-01 DIAGNOSIS — E11.9 TYPE 2 DIABETES MELLITUS WITHOUT COMPLICATION, WITHOUT LONG-TERM CURRENT USE OF INSULIN: ICD-10-CM

## 2022-11-01 DIAGNOSIS — Z12.5 PROSTATE CANCER SCREENING: ICD-10-CM

## 2022-11-01 PROBLEM — C34.31 MALIGNANT NEOPLASM OF LOWER LOBE, RIGHT BRONCHUS OR LUNG (HCC): Status: ACTIVE | Noted: 2022-04-15

## 2022-11-01 PROBLEM — I82.452: Status: ACTIVE | Noted: 2022-04-15

## 2022-11-01 PROBLEM — I82.411 ACUTE EMBOLISM AND THROMBOSIS OF RIGHT FEMORAL VEIN: Status: ACTIVE | Noted: 2022-04-15

## 2022-11-01 LAB
ALBUMIN SERPL-MCNC: 4.5 G/DL (ref 3.5–5.2)
ALBUMIN UR-MCNC: <1.2 MG/DL
ALBUMIN/GLOB SERPL: 1.7 G/DL
ALP SERPL-CCNC: 60 U/L (ref 39–117)
ALT SERPL W P-5'-P-CCNC: 26 U/L (ref 1–41)
ANION GAP SERPL CALCULATED.3IONS-SCNC: 8.9 MMOL/L (ref 5–15)
AST SERPL-CCNC: 24 U/L (ref 1–40)
BASOPHILS # BLD AUTO: 0.05 10*3/MM3 (ref 0–0.2)
BASOPHILS NFR BLD AUTO: 0.7 % (ref 0–1.5)
BILIRUB SERPL-MCNC: 0.2 MG/DL (ref 0–1.2)
BUN SERPL-MCNC: 17 MG/DL (ref 8–23)
BUN/CREAT SERPL: 19.8 (ref 7–25)
CALCIUM SPEC-SCNC: 9.4 MG/DL (ref 8.6–10.5)
CHLORIDE SERPL-SCNC: 102 MMOL/L (ref 98–107)
CHOLEST SERPL-MCNC: 215 MG/DL (ref 0–200)
CO2 SERPL-SCNC: 29.1 MMOL/L (ref 22–29)
CREAT SERPL-MCNC: 0.86 MG/DL (ref 0.76–1.27)
CREAT UR-MCNC: 80.6 MG/DL
DEPRECATED RDW RBC AUTO: 46.1 FL (ref 37–54)
EGFRCR SERPLBLD CKD-EPI 2021: 93.1 ML/MIN/1.73
EOSINOPHIL # BLD AUTO: 0.14 10*3/MM3 (ref 0–0.4)
EOSINOPHIL NFR BLD AUTO: 2 % (ref 0.3–6.2)
ERYTHROCYTE [DISTWIDTH] IN BLOOD BY AUTOMATED COUNT: 13.8 % (ref 12.3–15.4)
GLOBULIN UR ELPH-MCNC: 2.6 GM/DL
GLUCOSE SERPL-MCNC: 95 MG/DL (ref 65–99)
HBA1C MFR BLD: 5.7 % (ref 4.8–5.6)
HCT VFR BLD AUTO: 49.3 % (ref 37.5–51)
HDLC SERPL-MCNC: 71 MG/DL (ref 40–60)
HGB BLD-MCNC: 16.8 G/DL (ref 13–17.7)
IMM GRANULOCYTES # BLD AUTO: 0.02 10*3/MM3 (ref 0–0.05)
IMM GRANULOCYTES NFR BLD AUTO: 0.3 % (ref 0–0.5)
LDLC SERPL CALC-MCNC: 129 MG/DL (ref 0–100)
LDLC/HDLC SERPL: 1.79 {RATIO}
LYMPHOCYTES # BLD AUTO: 2.03 10*3/MM3 (ref 0.7–3.1)
LYMPHOCYTES NFR BLD AUTO: 29 % (ref 19.6–45.3)
MCH RBC QN AUTO: 31.3 PG (ref 26.6–33)
MCHC RBC AUTO-ENTMCNC: 34.1 G/DL (ref 31.5–35.7)
MCV RBC AUTO: 91.8 FL (ref 79–97)
MICROALBUMIN/CREAT UR: NORMAL MG/G{CREAT}
MONOCYTES # BLD AUTO: 0.43 10*3/MM3 (ref 0.1–0.9)
MONOCYTES NFR BLD AUTO: 6.1 % (ref 5–12)
NEUTROPHILS NFR BLD AUTO: 4.34 10*3/MM3 (ref 1.7–7)
NEUTROPHILS NFR BLD AUTO: 61.9 % (ref 42.7–76)
NRBC BLD AUTO-RTO: 0 /100 WBC (ref 0–0.2)
PLATELET # BLD AUTO: 192 10*3/MM3 (ref 140–450)
PMV BLD AUTO: 10.5 FL (ref 6–12)
POTASSIUM SERPL-SCNC: 4.1 MMOL/L (ref 3.5–5.2)
PROT SERPL-MCNC: 7.1 G/DL (ref 6–8.5)
PSA SERPL-MCNC: 0.54 NG/ML (ref 0–4)
RBC # BLD AUTO: 5.37 10*6/MM3 (ref 4.14–5.8)
SODIUM SERPL-SCNC: 140 MMOL/L (ref 136–145)
TRIGL SERPL-MCNC: 86 MG/DL (ref 0–150)
VLDLC SERPL-MCNC: 15 MG/DL (ref 5–40)
WBC NRBC COR # BLD: 7.01 10*3/MM3 (ref 3.4–10.8)

## 2022-11-01 PROCEDURE — 82043 UR ALBUMIN QUANTITATIVE: CPT | Performed by: FAMILY MEDICINE

## 2022-11-01 PROCEDURE — 83036 HEMOGLOBIN GLYCOSYLATED A1C: CPT | Performed by: FAMILY MEDICINE

## 2022-11-01 PROCEDURE — 1170F FXNL STATUS ASSESSED: CPT | Performed by: FAMILY MEDICINE

## 2022-11-01 PROCEDURE — 1160F RVW MEDS BY RX/DR IN RCRD: CPT | Performed by: FAMILY MEDICINE

## 2022-11-01 PROCEDURE — 1126F AMNT PAIN NOTED NONE PRSNT: CPT | Performed by: FAMILY MEDICINE

## 2022-11-01 PROCEDURE — 80053 COMPREHEN METABOLIC PANEL: CPT | Performed by: FAMILY MEDICINE

## 2022-11-01 PROCEDURE — G0103 PSA SCREENING: HCPCS | Performed by: FAMILY MEDICINE

## 2022-11-01 PROCEDURE — 82570 ASSAY OF URINE CREATININE: CPT | Performed by: FAMILY MEDICINE

## 2022-11-01 PROCEDURE — G0439 PPPS, SUBSEQ VISIT: HCPCS | Performed by: FAMILY MEDICINE

## 2022-11-01 PROCEDURE — 80061 LIPID PANEL: CPT | Performed by: FAMILY MEDICINE

## 2022-11-01 PROCEDURE — 85025 COMPLETE CBC W/AUTO DIFF WBC: CPT | Performed by: FAMILY MEDICINE

## 2022-11-01 RX ORDER — TESTOSTERONE CYPIONATE 200 MG/ML
INJECTION, SOLUTION INTRAMUSCULAR
Qty: 2 ML | Refills: 1 | Status: SHIPPED | OUTPATIENT
Start: 2022-11-01 | End: 2023-03-16 | Stop reason: SDUPTHER

## 2022-11-01 RX ORDER — KETOCONAZOLE 20 MG/G
CREAM TOPICAL
Qty: 45 G | Refills: 5 | Status: SHIPPED | OUTPATIENT
Start: 2022-11-01

## 2022-11-01 NOTE — PATIENT INSTRUCTIONS
"Hypertension, Adult  High blood pressure (hypertension) is when the force of blood pumping through the arteries is too strong. The arteries are the blood vessels that carry blood from the heart throughout the body. Hypertension forces the heart to work harder to pump blood and may cause arteries to become narrow or stiff. Untreated or uncontrolled hypertension can cause a heart attack, heart failure, a stroke, kidney disease, and other problems.  A blood pressure reading consists of a higher number over a lower number. Ideally, your blood pressure should be below 120/80. The first (\"top\") number is called the systolic pressure. It is a measure of the pressure in your arteries as your heart beats. The second (\"bottom\") number is called the diastolic pressure. It is a measure of the pressure in your arteries as the heart relaxes.  What are the causes?  The exact cause of this condition is not known. There are some conditions that result in or are related to high blood pressure.  What increases the risk?  Some risk factors for high blood pressure are under your control. The following factors may make you more likely to develop this condition:  Smoking.  Having type 2 diabetes mellitus, high cholesterol, or both.  Not getting enough exercise or physical activity.  Being overweight.  Having too much fat, sugar, calories, or salt (sodium) in your diet.  Drinking too much alcohol.  Some risk factors for high blood pressure may be difficult or impossible to change. Some of these factors include:  Having chronic kidney disease.  Having a family history of high blood pressure.  Age. Risk increases with age.  Race. You may be at higher risk if you are .  Gender. Men are at higher risk than women before age 45. After age 65, women are at higher risk than men.  Having obstructive sleep apnea.  Stress.  What are the signs or symptoms?  High blood pressure may not cause symptoms. Very high blood pressure " (hypertensive crisis) may cause:  Headache.  Anxiety.  Shortness of breath.  Nosebleed.  Nausea and vomiting.  Vision changes.  Severe chest pain.  Seizures.  How is this diagnosed?  This condition is diagnosed by measuring your blood pressure while you are seated, with your arm resting on a flat surface, your legs uncrossed, and your feet flat on the floor. The cuff of the blood pressure monitor will be placed directly against the skin of your upper arm at the level of your heart. It should be measured at least twice using the same arm. Certain conditions can cause a difference in blood pressure between your right and left arms.  Certain factors can cause blood pressure readings to be lower or higher than normal for a short period of time:  When your blood pressure is higher when you are in a health care provider's office than when you are at home, this is called white coat hypertension. Most people with this condition do not need medicines.  When your blood pressure is higher at home than when you are in a health care provider's office, this is called masked hypertension. Most people with this condition may need medicines to control blood pressure.  If you have a high blood pressure reading during one visit or you have normal blood pressure with other risk factors, you may be asked to:  Return on a different day to have your blood pressure checked again.  Monitor your blood pressure at home for 1 week or longer.  If you are diagnosed with hypertension, you may have other blood or imaging tests to help your health care provider understand your overall risk for other conditions.  How is this treated?  This condition is treated by making healthy lifestyle changes, such as eating healthy foods, exercising more, and reducing your alcohol intake. Your health care provider may prescribe medicine if lifestyle changes are not enough to get your blood pressure under control, and if:  Your systolic blood pressure is above  130.  Your diastolic blood pressure is above 80.  Your personal target blood pressure may vary depending on your medical conditions, your age, and other factors.  Follow these instructions at home:  Eating and drinking    Eat a diet that is high in fiber and potassium, and low in sodium, added sugar, and fat. An example eating plan is called the DASH (Dietary Approaches to Stop Hypertension) diet. To eat this way:  Eat plenty of fresh fruits and vegetables. Try to fill one half of your plate at each meal with fruits and vegetables.  Eat whole grains, such as whole-wheat pasta, brown rice, or whole-grain bread. Fill about one fourth of your plate with whole grains.  Eat or drink low-fat dairy products, such as skim milk or low-fat yogurt.  Avoid fatty cuts of meat, processed or cured meats, and poultry with skin. Fill about one fourth of your plate with lean proteins, such as fish, chicken without skin, beans, eggs, or tofu.  Avoid pre-made and processed foods. These tend to be higher in sodium, added sugar, and fat.  Reduce your daily sodium intake. Most people with hypertension should eat less than 1,500 mg of sodium a day.  Do not drink alcohol if:  Your health care provider tells you not to drink.  You are pregnant, may be pregnant, or are planning to become pregnant.  If you drink alcohol:  Limit how much you use to:  0-1 drink a day for women.  0-2 drinks a day for men.  Be aware of how much alcohol is in your drink. In the [place] one drink equals one 12 oz bottle of beer (355 mL), one 5 oz glass of wine (148 mL), or one 1½ oz glass of hard liquor (44 mL).  Lifestyle    Work with your health care provider to maintain a healthy body weight or to lose weight. Ask what an ideal weight is for you.  Get at least 30 minutes of exercise most days of the week. Activities may include walking, swimming, or biking.  Include exercise to strengthen your muscles (resistance exercise), such as Pilates or lifting weights, as  part of your weekly exercise routine. Try to do these types of exercises for 30 minutes at least 3 days a week.  Do not use any products that contain nicotine or tobacco, such as cigarettes, e-cigarettes, and chewing tobacco. If you need help quitting, ask your health care provider.  Monitor your blood pressure at home as told by your health care provider.  Keep all follow-up visits as told by your health care provider. This is important.  Medicines  Take over-the-counter and prescription medicines only as told by your health care provider. Follow directions carefully. Blood pressure medicines must be taken as prescribed.  Do not skip doses of blood pressure medicine. Doing this puts you at risk for problems and can make the medicine less effective.  Ask your health care provider about side effects or reactions to medicines that you should watch for.  Contact a health care provider if you:  Think you are having a reaction to a medicine you are taking.  Have headaches that keep coming back (recurring).  Feel dizzy.  Have swelling in your ankles.  Have trouble with your vision.  Get help right away if you:  Develop a severe headache or confusion.  Have unusual weakness or numbness.  Feel faint.  Have severe pain in your chest or abdomen.  Vomit repeatedly.  Have trouble breathing.  Summary  Hypertension is when the force of blood pumping through your arteries is too strong. If this condition is not controlled, it may put you at risk for serious complications.  Your personal target blood pressure may vary depending on your medical conditions, your age, and other factors. For most people, a normal blood pressure is less than 120/80.  Hypertension is treated with lifestyle changes, medicines, or a combination of both. Lifestyle changes include losing weight, eating a healthy, low-sodium diet, exercising more, and limiting alcohol.  This information is not intended to replace advice given to you by your health care  provider. Make sure you discuss any questions you have with your health care provider.  Document Revised: 08/28/2019 Document Reviewed: 08/28/2019  [employer] Patient Education © 2022 [employer]  Diabetes Mellitus and Nutrition, Adult  When you have diabetes, or diabetes mellitus, it is very important to have healthy eating habits because your blood sugar (glucose) levels are greatly affected by what you eat and drink. Eating healthy foods in the right amounts, at about the same times every day, can help you:  Manage your blood glucose.  Lower your risk of heart disease.  Improve your blood pressure.  Reach or maintain a healthy weight.  What can affect my meal plan?  Every person with diabetes is different, and each person has different needs for a meal plan. Your health care provider may recommend that you work with a dietitian to make a meal plan that is best for you. Your meal plan may vary depending on factors such as:  The calories you need.  The medicines you take.  Your weight.  Your blood glucose, blood pressure, and cholesterol levels.  Your activity level.  Other health conditions you have, such as heart or kidney disease.  How do carbohydrates affect me?  Carbohydrates, also called carbs, affect your blood glucose level more than any other type of food. Eating carbs raises the amount of glucose in your blood.  It is important to know how many carbs you can safely have in each meal. This is different for every person. Your dietitian can help you calculate how many carbs you should have at each meal and for each snack.  How does alcohol affect me?  Alcohol can cause a decrease in blood glucose (hypoglycemia), especially if you use insulin or take certain diabetes medicines by mouth. Hypoglycemia can be a life-threatening condition. Symptoms of hypoglycemia, such as sleepiness, dizziness, and confusion, are similar to symptoms of having too much alcohol.  Do not drink alcohol if:  Your health care provider  "tells you not to drink.  You are pregnant, may be pregnant, or are planning to become pregnant.  If you drink alcohol:  Limit how much you have to:  0-1 drink a day for women.  0-2 drinks a day for men.  Know how much alcohol is in your drink. In the [place] one drink equals one 12 oz bottle of beer (355 mL), one 5 oz glass of wine (148 mL), or one 1½ oz glass of hard liquor (44 mL).  Keep yourself hydrated with water, diet soda, or unsweetened iced tea. Keep in mind that regular soda, juice, and other mixers may contain a lot of sugar and must be counted as carbs.  What are tips for following this plan?  Reading food labels  Start by checking the serving size on the Nutrition Facts label of packaged foods and drinks. The number of calories and the amount of carbs, fats, and other nutrients listed on the label are based on one serving of the item. Many items contain more than one serving per package.  Check the total grams (g) of carbs in one serving.  Check the number of grams of saturated fats and trans fats in one serving. Choose foods that have a low amount or none of these fats.  Check the number of milligrams (mg) of salt (sodium) in one serving. Most people should limit total sodium intake to less than 2,300 mg per day.  Always check the nutrition information of foods labeled as \"low-fat\" or \"nonfat.\" These foods may be higher in added sugar or refined carbs and should be avoided.  Talk to your dietitian to identify your daily goals for nutrients listed on the label.  Shopping  Avoid buying canned, pre-made, or processed foods. These foods tend to be high in fat, sodium, and added sugar.  Shop around the outside edge of the grocery store. This is where you will most often find fresh fruits and vegetables, bulk grains, fresh meats, and fresh dairy products.  Cooking  Use low-heat cooking methods, such as baking, instead of high-heat cooking methods, such as deep frying.  Cook using healthy oils, such as olive, " canola, or sunflower oil.  Avoid cooking with butter, cream, or high-fat meats.  Meal planning  Eat meals and snacks regularly, preferably at the same times every day. Avoid going long periods of time without eating.  Eat foods that are high in fiber, such as fresh fruits, vegetables, beans, and whole grains.  Eat 4-6 oz (112-168 g) of lean protein each day, such as lean meat, chicken, fish, eggs, or tofu. One ounce (oz) (28 g) of lean protein is equal to:  1 oz (28 g) of meat, chicken, or fish.  1 egg.  ¼ cup (62 g) of tofu.  Eat some foods each day that contain healthy fats, such as avocado, nuts, seeds, and fish.  What foods should I eat?  Fruits  Berries. Apples. Oranges. Peaches. Apricots. Plums. Grapes. Mangoes. Papayas. Pomegranates. Kiwi. Cherries.  Vegetables  Leafy greens, including lettuce, spinach, kale, chard, manuel greens, mustard greens, and cabbage. Beets. Cauliflower. Broccoli. Carrots. Green beans. Tomatoes. Peppers. Onions. Cucumbers. [place] sprouts.  Grains  Whole grains, such as whole-wheat or whole-grain bread, crackers, tortillas, cereal, and pasta. Unsweetened oatmeal. Quinoa. Brown or wild rice.  Meats and other proteins  Seafood. Poultry without skin. Lean cuts of poultry and beef. Tofu. Nuts. Seeds.  Dairy  Low-fat or fat-free dairy products such as milk, yogurt, and cheese.  The items listed above may not be a complete list of foods and beverages you can eat and drink. Contact a dietitian for more information.  What foods should I avoid?  Fruits  Fruits canned with syrup.  Vegetables  Canned vegetables. Frozen vegetables with butter or cream sauce.  Grains  Refined white flour and flour products such as bread, pasta, snack foods, and cereals. Avoid all processed foods.  Meats and other proteins  Fatty cuts of meat. Poultry with skin. Breaded or fried meats. Processed meat. Avoid saturated fats.  Dairy  Full-fat yogurt, cheese, or milk.  Beverages  Sweetened drinks, such as soda or  iced tea.  The items listed above may not be a complete list of foods and beverages you should avoid. Contact a dietitian for more information.  Questions to ask a health care provider  Do I need to meet with a certified diabetes care and ?  Do I need to meet with a dietitian?  What number can I call if I have questions?  When are the best times to check my blood glucose?  Where to find more information:  American Diabetes Association: diabetes.org  Academy of Nutrition and Dietetics: eatright.org  National [place] of Diabetes and Digestive and Kidney Diseases: niddk.nih.gov  Association of Diabetes Care & Education Specialists: diabeteseducator.org  Summary  It is important to have healthy eating habits because your blood sugar (glucose) levels are greatly affected by what you eat and drink. It is important to use alcohol carefully.  A healthy meal plan will help you manage your blood glucose and lower your risk of heart disease.  Your health care provider may recommend that you work with a dietitian to make a meal plan that is best for you.  This information is not intended to replace advice given to you by your health care provider. Make sure you discuss any questions you have with your health care provider.  Document Revised: 07/21/2021 Document Reviewed: 07/21/2021  [employer] Patient Education © 2022 [employer]  BMI for Adults  What is BMI?  Body mass index (BMI) is a number that is calculated from a person's weight and height. BMI can help estimate how much of a person's weight is composed of fat. BMI does not measure body fat directly. Rather, it is an alternative to procedures that directly measure body fat, which can be difficult and expensive.  BMI can help identify people who may be at higher risk for certain medical problems.  What are BMI measurements used for?  BMI is used as a screening tool to identify possible weight problems. It helps determine whether a person is obese,  "overweight, a healthy weight, or underweight.  BMI is useful for:  Identifying a weight problem that may be related to a medical condition or may increase the risk for medical problems.  Promoting changes, such as changes in diet and exercise, to help reach a healthy weight. BMI screening can be repeated to see if these changes are working.  How is BMI calculated?  BMI involves measuring your weight in relation to your height. Both height and weight are measured, and the BMI is calculated from those numbers. This can be done either in English ([place] or metric measurements. Note that charts and online BMI calculators are available to help you find your BMI quickly and easily without having to do these calculations yourself.  To calculate your BMI in English ([place] measurements:    Measure your weight in pounds (lb).  Multiply the number of pounds by 703.  For example, for a person who weighs 180 lb, multiply that number by 703, which equals 126,540.  Measure your height in inches. Then multiply that number by itself to get a measurement called \"inches squared.\"  For example, for a person who is 70 inches tall, the \"inches squared\" measurement is 70 inches x 70 inches, which equals 4,900 inches squared.  Divide the total from step 2 (number of lb x 703) by the total from step 3 (inches squared): 126,540 ÷ 4,900 = 25.8. This is your BMI.  To calculate your BMI in metric measurements:  Measure your weight in kilograms (kg).  Measure your height in meters (m). Then multiply that number by itself to get a measurement called \"meters squared.\"  For example, for a person who is 1.75 m tall, the \"meters squared\" measurement is 1.75 m x 1.75 m, which is equal to 3.1 meters squared.  Divide the number of kilograms (your weight) by the meters squared number. In this example: 70 ÷ 3.1 = 22.6. This is your BMI.  What do the results mean?  BMI charts are used to identify whether you are underweight, normal weight, overweight, or " obese. The following guidelines will be used:  Underweight: BMI less than 18.5.  Normal weight: BMI between 18.5 and 24.9.  Overweight: BMI between 25 and 29.9.  Obese: BMI of 30 or above.  Keep these notes in mind:  Weight includes both fat and muscle, so someone with a muscular build, such as an athlete, may have a BMI that is higher than 24.9. In cases like these, BMI is not an accurate measure of body fat.  To determine if excess body fat is the cause of a BMI of 25 or higher, further assessments may need to be done by a health care provider.  BMI is usually interpreted in the same way for men and women.  Where to find more information  For more information about BMI, including tools to quickly calculate your BMI, go to these websites:  Centers for Disease Control and Prevention: www.cdc.gov  American Heart Association: www.heart.org  National Heart, Lung, and Blood [place]: www.nhlbi.nih.gov  Summary  Body mass index (BMI) is a number that is calculated from a person's weight and height.  BMI may help estimate how much of a person's weight is composed of fat. BMI can help identify those who may be at higher risk for certain medical problems.  BMI can be measured using English measurements or metric measurements.  BMI charts are used to identify whether you are underweight, normal weight, overweight, or obese.  This information is not intended to replace advice given to you by your health care provider. Make sure you discuss any questions you have with your health care provider.  Document Revised: 2020 Document Reviewed: 2020  [employer] Patient Education ©  [employer]    Medicare Wellness  Personal Prevention Plan of Service     Date of Office Visit:    Encounter Provider:  Rory Ayers MD  Place of Service:  [facility]  Patient Name: Rey Garrison Jr.  :  1950    As part of the Medicare Wellness portion of your visit today, we are providing  you with this personalized preventive plan of services (PPPS). This plan is based upon recommendations of the [place] Preventive Services Task Force (USPSTF) and the Advisory Committee on Immunization Practices (ACIP).    This lists the preventive care services that should be considered, and provides dates of when you are due. Items listed as completed are up-to-date and do not require any further intervention.    Health Maintenance   Topic Date Due    Hepatitis B (1 of 3 - Risk 3-dose series) Never done    DIABETIC FOOT EXAM  Never done    HEMOGLOBIN A1C  03/16/2020    URINE MICROALBUMIN  09/16/2020    ANNUAL WELLNESS VISIT  07/24/2021    TDAP/TD VACCINES (1 - Tdap) 11/01/2022 (Originally 1/11/1969)    DIABETIC EYE EXAM  12/20/2022 (Originally 10/19/2021)    ZOSTER VACCINE (2 of 3) 11/01/2023 (Originally 10/24/2013)    COLORECTAL CANCER SCREENING  05/08/2028    HEPATITIS C SCREENING  Completed    COVID-19 Vaccine  Completed    INFLUENZA VACCINE  Completed    Pneumococcal Vaccine 65+  Completed    AAA SCREEN (ONE-TIME)  Completed       Orders Placed This Encounter   Procedures    Comprehensive Metabolic Panel     Standing Status:   Future     Order Specific Question:   Release to patient     Answer:   Routine Release    Lipid Panel     Standing Status:   Future    Hemoglobin A1c     Standing Status:   Future     Order Specific Question:   Release to patient     Answer:   Routine Release    PSA Screen     Standing Status:   Future     Order Specific Question:   Release to patient     Answer:   Routine Release    Microalbumin / Creatinine Urine Ratio - Urine, Clean Catch     Standing Status:   Future     Standing Expiration Date:   11/1/2023     Order Specific Question:   Release to patient     Answer:   Routine Release    CBC & Differential     Standing Status:   Future     Order Specific Question:   Manual Differential     Answer:   No       Return in about 6 months (around 5/1/2023) for Follow-up HTN, low  TI

## 2022-11-01 NOTE — PROGRESS NOTES
The ABCs of the Annual Wellness Visit  Subsequent Medicare Wellness Visit    Chief Complaint   Patient presents with   • Medicare Wellness-subsequent      Subjective    History of Present Illness:  Rey Garrison Jr. is a 72 y.o. male who presents for a Subsequent Medicare Wellness Visit.    The following portions of the patient's history were reviewed and   updated as appropriate: allergies, current medications, past family history, past medical history, past social history, past surgical history and problem list.    Compared to one year ago, the patient feels his physical   health is better. Since coming off ventilator after being diagnosed with lung cancer.     Compared to one year ago, the patient feels his mental   health is the same.    Recent Hospitalizations:  He was admitted within the past 365 days at [facility].       Current Medical Providers:  Patient Care Team:  Rory Ayers MD as PCP - General (Family Medicine)  Bony Brito MD as Consulting Physician (Hematology and Oncology)  Lori Ford MD (Pulmonary Disease)  Bibi Kirkpatrick MD (Internal Medicine)  Lobito Wood MD (Cardiac Electrophysiology)    Outpatient Medications Prior to Visit   Medication Sig Dispense Refill   • albuterol sulfate  (90 Base) MCG/ACT inhaler Inhale 2 puffs Every 6 (Six) Hours As Needed for Wheezing. 8.5 g 2   • apixaban (ELIQUIS) 5 MG tablet tablet 1 tablet Every 12 (Twelve) Hours. 60 tablet    • Cholecalciferol (Vitamin D3) 1.25 MG (39625 UT) capsule 50 mcg.     • doxazosin (CARDURA) 4 MG tablet TAKE 2 TABLETS BY MOUTH DAILY 180 tablet 3   • famotidine (PEPCID) 20 MG tablet 20 mg.     • finasteride (PROSCAR) 5 MG tablet Take 1 tablet by mouth Daily. 90 tablet 3   • Fluticasone-Umeclidin-Vilant (Trelegy Ellipta) 200-62.5-25 MCG/INH inhaler Inhale 1 puff Daily. 60 each 11   • Multiple Vitamin (MULTI-VITAMIN DAILY PO) Take 1 tablet by mouth Daily.     • sildenafil (VIAGRA) 100 MG tablet  Take 1 tablet by mouth Daily As Needed for Erectile Dysfunction. 15 tablet 5   • tiZANidine (ZANAFLEX) 4 MG tablet TAKE 1 TABLET BY MOUTH 3 TIMES A DAY AS NEEDED FOR MUSCLE SPASMS. 270 tablet 0   • triamcinolone (KENALOG) 0.5 % cream Apply 1 application topically to the appropriate area as directed 2 (Two) Times a Day. 30 g 3   • venlafaxine XR (EFFEXOR-XR) 37.5 MG 24 hr capsule Take 1 capsule by mouth Daily. 30 capsule 5   • ketoconazole (NIZORAL) 2 % cream Apply to appropriate area twice daily 45 g 5   • Testosterone Cypionate (DEPOTESTOTERONE CYPIONATE) 200 MG/ML injection Inject 1.5 ml IM every 14 days 2 mL 1   • azithromycin (ZITHROMAX) 250 MG tablet Take 1 tablet by mouth Daily. Take 2 tablets the first day, then 1 tablet daily for 4 days. 6 tablet 0   • doxycycline (VIBRAMYCIN) 100 MG capsule TAKE 1 CAPSULE EVERY 12 HOURS DAILY BY MOUTH FOR 5 DAYS     • hydrOXYzine (ATARAX) 25 MG tablet TAKE 1 TABLET BY MOUTH EVERY 6 HOURS AS NEEDED FOR ANXIETY. 100 tablet 5     No facility-administered medications prior to visit.       No opioid medication identified on active medication list. I have reviewed chart for other potential  high risk medication/s and harmful drug interactions in the elderly.          Aspirin is not on active medication list.  Aspirin use is contraindicated for this patient due to: current use of Eliquis.  .    Patient Active Problem List   Diagnosis   • Type 2 diabetes mellitus ([facility])   • Tobacco abuse   • Sciatica of right side   • Mediastinal lymphadenopathy   • Left inguinal hernia   • Hypogonadism in male   • Hiatal hernia   • Hearing loss   • Dermatitis of external ear   • Depression with anxiety   • COPD (chronic obstructive pulmonary disease) ([facility])   • Benign prostatic hypertrophy   • Pre-op exam   • Essential hypertension   • Snoring   • Cervical spondylosis with myelopathy   • Cervical myelopathy with cervical radiculopathy ([facility])   • Anxiety associated with cancer diagnosis ([facility])   •  "Physical deconditioning   • Encounter for postoperative wound care   • Lung mass   • Paroxysmal atrial fibrillation (HCC)   • Mass of soft tissue   • Malignant neoplasm of lower lobe, right bronchus or lung (HCC)   • Acute embolism and thrombosis of right femoral vein (HCC)   • Acute embolism and thrombosis of left peroneal vein (HCC)   • Medicare annual wellness visit, subsequent     Advance Care Planning  Advance Directive is not on file.  ACP discussion was held with the patient during this visit. Patient has an advance directive (not in EMR), copy requested.    Review of Systems   Constitutional: Negative for activity change, appetite change, chills, diaphoresis, fatigue and fever.   HENT: Negative for congestion, ear pain, postnasal drip, sinus pressure and sore throat.    Eyes: Negative for visual disturbance.   Respiratory: Positive for shortness of breath (chronic and improving). Negative for cough and chest tightness.    Cardiovascular: Negative for chest pain, palpitations and leg swelling.   Gastrointestinal: Negative for abdominal pain, blood in stool, constipation, diarrhea, nausea and vomiting.   Genitourinary: Negative for difficulty urinating, frequency and hematuria.   Musculoskeletal: Negative for arthralgias and myalgias.   Skin: Negative for rash.   Neurological: Positive for weakness (weakness in legs since neck surgery). Negative for dizziness, seizures and confusion.   Hematological: Does not bruise/bleed easily.   Psychiatric/Behavioral: Negative for dysphoric mood, sleep disturbance and suicidal ideas. The patient is not nervous/anxious.         Objective    Vitals:    11/01/22 0954   BP: 120/60   Pulse: 68   Temp: 98 °F (36.7 °C)   SpO2: 98%   Weight: 89.8 kg (198 lb)   Height: 172.7 cm (68\")   PainSc: 0-No pain     Estimated body mass index is 30.11 kg/m² as calculated from the following:    Height as of this encounter: 172.7 cm (68\").    Weight as of this encounter: 89.8 kg (198 " lb).    BMI is >= 30 and <35. (Class 1 Obesity). The following options were offered after discussion;: weight loss educational material (shared in after visit summary), exercise counseling/recommendations and nutrition counseling/recommendations      Does the patient have evidence of cognitive impairment? No    Physical Exam  Constitutional:       General: He is not in acute distress.     Appearance: He is well-developed. He is not diaphoretic.   HENT:      Head: Atraumatic.   Cardiovascular:      Rate and Rhythm: Normal rate and regular rhythm.      Heart sounds: Normal heart sounds. No murmur heard.    No friction rub. No gallop.   Pulmonary:      Effort: Pulmonary effort is normal. No respiratory distress.      Breath sounds: Normal breath sounds. No stridor. No wheezing, rhonchi or rales.   Abdominal:      General: Bowel sounds are normal. There is no distension.      Palpations: Abdomen is soft. There is no mass.      Tenderness: There is no abdominal tenderness. There is no guarding or rebound.      Hernia: No hernia is present.   Musculoskeletal:      Cervical back: Normal range of motion and neck supple.   Skin:     General: Skin is warm and dry.   Neurological:      Mental Status: He is alert and oriented to person, place, and time.   Psychiatric:         Behavior: Behavior normal.                 HEALTH RISK ASSESSMENT    Smoking Status:  Social History     Tobacco Use   Smoking Status Former   • Packs/day: 0.50   • Years: 1.00   • Pack years: 0.50   • Types: Cigarettes   • Quit date: 3/3/2022   • Years since quittin.6   Smokeless Tobacco Never     Alcohol Consumption:  Social History     Substance and Sexual Activity   Alcohol Use Yes   • Alcohol/week: 5.0 standard drinks   • Types: 5 Standard drinks or equivalent per week    Comment: 5 alcoholic beverages per week     Fall Risk Screen:    STEADI Fall Risk Assessment was completed, and patient is at LOW risk for falls.Assessment completed  on:11/1/2022    Depression Screening:  PHQ-2/PHQ-9 Depression Screening 11/1/2022   Retired PHQ-9 Total Score -   Retired Total Score -   Little Interest or Pleasure in Doing Things 0-->not at all   Feeling Down, Depressed or Hopeless 0-->not at all   PHQ-9: Brief Depression Severity Measure Score 0       Health Habits and Functional and Cognitive Screening:  Functional & Cognitive Status 11/1/2022   Do you have difficulty preparing food and eating? No   Do you have difficulty bathing yourself, getting dressed or grooming yourself? No   Do you have difficulty using the toilet? No   Do you have difficulty moving around from place to place? No   Do you have trouble with steps or getting out of a bed or a chair? No   Current Diet Well Balanced Diet   Dental Exam Not up to date   Eye Exam Not up to date   Exercise (times per week) 0 times per week   Current Exercises Include No Regular Exercise   Current Exercise Activities Include -   Do you need help using the phone?  No   Are you deaf or do you have serious difficulty hearing?  Yes   Do you need help with transportation? No   Do you need help shopping? No   Do you need help preparing meals?  No   Do you need help with housework?  No   Do you need help with laundry? No   Do you need help taking your medications? No   Do you need help managing money? No   Do you ever drive or ride in a car without wearing a seat belt? No   Have you felt unusual stress, anger or loneliness in the last month? No   Who do you live with? Other   If you need help, do you have trouble finding someone available to you? No   Have you been bothered in the last four weeks by sexual problems? No   Do you have difficulty concentrating, remembering or making decisions? No       Age-appropriate Screening Schedule:  Refer to the list below for future screening recommendations based on patient's age, sex and/or medical conditions. Orders for these recommended tests are listed in the plan section. The  patient has been provided with a written plan.    Health Maintenance   Topic Date Due   • DIABETIC FOOT EXAM  Never done   • HEMOGLOBIN A1C  03/16/2020   • URINE MICROALBUMIN  09/16/2020   • TDAP/TD VACCINES (1 - Tdap) 11/01/2022 (Originally 1/11/1969)   • DIABETIC EYE EXAM  12/20/2022 (Originally 10/19/2021)   • ZOSTER VACCINE (2 of 3) 11/01/2023 (Originally 10/24/2013)   • INFLUENZA VACCINE  Completed              Assessment & Plan   CMS Preventative Services Quick Reference  Risk Factors Identified During Encounter  Cardiovascular Disease  Immunizations Discussed/Encouraged (specific Immunizations; Tdap and Shingrix  The above risks/problems have been discussed with the patient.  Follow up actions/plans if indicated are seen below in the Assessment/Plan Section.  Pertinent information has been shared with the patient in the After Visit Summary.    Diagnoses and all orders for this visit:    1. Medicare annual wellness visit, subsequent (Primary)  Assessment & Plan:  The patient is here for health maintenance visit.  Currently, the patient consumes a healthy diet and has an inadequate exercise regimen.  Screening lab work is ordered.  Immunizations were reviewed today.  Advice and education was given regarding nutrition, aerobic exercise, routine dental evaluations, routine eye exams, reproductive health, cardiovascular risk reduction, sunscreen use, self skin examination (annual dermatology evaluations) and seatbelt use (general overall safety).  Further recommendations will be given if needed after lab evaluation.  Annual wellness evaluation is recommended.      Orders:  -     Comprehensive Metabolic Panel; Future  -     CBC & Differential; Future  -     Lipid Panel; Future  -     Hemoglobin A1c; Future  -     PSA Screen; Future  -     Microalbumin / Creatinine Urine Ratio - Urine, Clean Catch; Future    2. Hypogonadism in male  -     Testosterone Cypionate (DEPOTESTOTERONE CYPIONATE) 200 MG/ML injection; Inject  1.5 ml IM every 14 days  Dispense: 2 mL; Refill: 1    3. Low testosterone in male  -     Testosterone Cypionate (DEPOTESTOTERONE CYPIONATE) 200 MG/ML injection; Inject 1.5 ml IM every 14 days  Dispense: 2 mL; Refill: 1    4. Seborrheic dermatitis  -     ketoconazole (NIZORAL) 2 % cream; Apply to appropriate area twice daily  Dispense: 45 g; Refill: 5    5. Type 2 diabetes mellitus without complication, without long-term current use of insulin (HCC)  -     Comprehensive Metabolic Panel; Future  -     CBC & Differential; Future  -     Lipid Panel; Future  -     Hemoglobin A1c; Future  -     Microalbumin / Creatinine Urine Ratio - Urine, Clean Catch; Future    6. Essential hypertension  -     Comprehensive Metabolic Panel; Future  -     CBC & Differential; Future  -     Lipid Panel; Future    7. Prostate cancer screening  -     PSA Screen; Future      Follow Up:   Return in about 6 months (around 5/1/2023) for Follow-up HTN, low T.     An After Visit Summary and PPPS were made available to the patient.

## 2022-11-03 ENCOUNTER — TELEPHONE (OUTPATIENT)
Dept: FAMILY MEDICINE CLINIC | Facility: CLINIC | Age: 72
End: 2022-11-03

## 2022-11-03 DIAGNOSIS — Z12.83 SCREENING EXAM FOR SKIN CANCER: Primary | ICD-10-CM

## 2022-12-29 DIAGNOSIS — I10 ESSENTIAL HYPERTENSION: ICD-10-CM

## 2022-12-29 RX ORDER — DOXAZOSIN MESYLATE 4 MG/1
8 TABLET ORAL DAILY
Qty: 180 TABLET | Refills: 2 | Status: SHIPPED | OUTPATIENT
Start: 2022-12-29

## 2022-12-29 NOTE — TELEPHONE ENCOUNTER
Rx Refill Note  Requested Prescriptions     Pending Prescriptions Disp Refills   • doxazosin (CARDURA) 4 MG tablet [Pharmacy Med Name: DOXAZOSIN MESYLATE 4 MG TAB] 180 tablet 2     Sig: TAKE 2 TABLETS BY MOUTH DAILY      Last office visit with prescribing clinician: 11/1/2022   Last telemedicine visit with prescribing clinician: 5/1/2023   Next office visit with prescribing clinician: 5/1/2023                         Would you like a call back once the refill request has been completed: [] Yes [] No    If the office needs to give you a call back, can they leave a voicemail: [] Yes [] No    Danielle Knapp MA  12/29/22, 10:30 EST

## 2023-01-20 DIAGNOSIS — Z76.0 MEDICATION REFILL: ICD-10-CM

## 2023-01-20 DIAGNOSIS — J44.9 CHRONIC OBSTRUCTIVE PULMONARY DISEASE, UNSPECIFIED COPD TYPE: ICD-10-CM

## 2023-01-20 RX ORDER — ALBUTEROL SULFATE 90 UG/1
AEROSOL, METERED RESPIRATORY (INHALATION)
Qty: 18 G | Refills: 2 | Status: SHIPPED | OUTPATIENT
Start: 2023-01-20

## 2023-01-20 NOTE — TELEPHONE ENCOUNTER
Rx Refill Note  Requested Prescriptions     Pending Prescriptions Disp Refills   • albuterol sulfate  (90 Base) MCG/ACT inhaler [Pharmacy Med Name: ALBUTEROL HFA (PROAIR) INHALER]  2     Sig: INHALE 2 PUFFS EVERY 6 HOURS AS NEEDED FOR WHEEZING      Last office visit with prescribing clinician: 11/1/2022   Last telemedicine visit with prescribing clinician: 5/1/2023   Next office visit with prescribing clinician: 5/1/2023                         Would you like a call back once the refill request has been completed: [] Yes [] No    If the office needs to give you a call back, can they leave a voicemail: [] Yes [] No    Danielle Knapp MA  01/20/23, 13:45 EST

## 2023-02-14 DIAGNOSIS — C80.1 ANXIETY ASSOCIATED WITH CANCER DIAGNOSIS: ICD-10-CM

## 2023-02-14 DIAGNOSIS — F41.1 ANXIETY ASSOCIATED WITH CANCER DIAGNOSIS: ICD-10-CM

## 2023-02-14 RX ORDER — VENLAFAXINE HYDROCHLORIDE 37.5 MG/1
37.5 CAPSULE, EXTENDED RELEASE ORAL DAILY
Qty: 30 CAPSULE | Refills: 5 | Status: SHIPPED | OUTPATIENT
Start: 2023-02-14

## 2023-03-15 ENCOUNTER — TELEPHONE (OUTPATIENT)
Dept: FAMILY MEDICINE CLINIC | Facility: CLINIC | Age: 73
End: 2023-03-15

## 2023-03-15 NOTE — TELEPHONE ENCOUNTER
PATIENT HAS CALLED REQUESTING A CALL BACK TO DISCUSS HIS   Testosterone Cypionate (DEPOTESTOTERONE CYPIONATE) 200 MG/ML injection. PATIENT STATES HE IS NOT GETTING ENOUGH MEDICATION OR BOTTLES TO GET HIM THROUGH THE MONTH.    CALL BACK NUMBER [phone]

## 2023-03-16 DIAGNOSIS — E29.1 HYPOGONADISM IN MALE: ICD-10-CM

## 2023-03-16 DIAGNOSIS — R79.89 LOW TESTOSTERONE IN MALE: ICD-10-CM

## 2023-03-16 RX ORDER — TESTOSTERONE CYPIONATE 200 MG/ML
INJECTION, SOLUTION INTRAMUSCULAR
Qty: 3 ML | Refills: 2 | Status: SHIPPED | OUTPATIENT
Start: 2023-03-16

## 2023-03-16 RX ORDER — TESTOSTERONE CYPIONATE 200 MG/ML
INJECTION, SOLUTION INTRAMUSCULAR
Qty: 3 ML | Refills: 2 | Status: SHIPPED | OUTPATIENT
Start: 2023-03-16 | End: 2023-03-16 | Stop reason: SDUPTHER

## 2023-03-16 NOTE — TELEPHONE ENCOUNTER
Pt notifiedd. However he is needing this ro be sent to [facility] . Pt is needing three at a time instead of , he says this isnt enough

## 2023-03-20 DIAGNOSIS — Z76.0 MEDICATION REFILL: ICD-10-CM

## 2023-03-20 DIAGNOSIS — J44.9 CHRONIC OBSTRUCTIVE PULMONARY DISEASE, UNSPECIFIED COPD TYPE: ICD-10-CM

## 2023-03-20 DIAGNOSIS — S46.811A STRAIN OF RIGHT TRAPEZIUS MUSCLE, INITIAL ENCOUNTER: ICD-10-CM

## 2023-03-20 RX ORDER — TIZANIDINE 4 MG/1
TABLET ORAL
Qty: 270 TABLET | Refills: 0 | Status: SHIPPED | OUTPATIENT
Start: 2023-03-20

## 2023-03-20 NOTE — TELEPHONE ENCOUNTER
Rx Refill Note  Requested Prescriptions     Pending Prescriptions Disp Refills   • tiZANidine (ZANAFLEX) 4 MG tablet [Pharmacy Med Name: TIZANIDINE HCL 4 MG TABLET] 270 tablet 0     Sig: TAKE 1 TABLET BY MOUTH THREE TIMES A DAY AS NEEDED FOR MUSCLE SPASM      Last office visit with prescribing clinician: 11/1/2022   Last telemedicine visit with prescribing clinician: 5/1/2023   Next office visit with prescribing clinician: 5/1/2023                         Would you like a call back once the refill request has been completed: [] Yes [] No    If the office needs to give you a call back, can they leave a voicemail: [] Yes [] No    Donnie Shin MA  03/20/23, 08:09 EDT

## 2023-03-20 NOTE — TELEPHONE ENCOUNTER
Caller: Rey Garrison Jr.    Relationship: Self    Best call back number: [phone]    Requested Prescriptions:   Requested Prescriptions     Pending Prescriptions Disp Refills   • Fluticasone-Umeclidin-Vilant (Trelegy Ellipta) 200-62.5-25 MCG/ACT aerosol powder  60 each 11     Sig: Inhale 1 puff Daily.        Pharmacy where request should be sent: [facility]/PHARMACY #[id number] - [address] [phone] [facility] [phone]      Additional details provided by patient: DROPPED MEDICATION AND BROKE IT.    Does the patient have less than a 3 day supply:  [x] Yes  [] No    Would you like a call back once the refill request has been completed: [x] Yes [] No    If the office needs to give you a call back, can they leave a voicemail: [x] Yes [] No    Remberto Donis Rep   03/20/23 12:29 EDT

## 2023-03-20 NOTE — TELEPHONE ENCOUNTER
Rx Refill Note  Requested Prescriptions     Pending Prescriptions Disp Refills   • Fluticasone-Umeclidin-Vilant (Trelegy Ellipta) 200-62.5-25 MCG/ACT aerosol powder  60 each 11     Sig: Inhale 1 puff Daily.      Last office visit with prescribing clinician: 11/1/2022   Last telemedicine visit with prescribing clinician: 5/1/2023   Next office visit with prescribing clinician: 5/1/2023                         Would you like a call back once the refill request has been completed: [] Yes [] No    If the office needs to give you a call back, can they leave a voicemail: [] Yes [] No    Danielle Knapp MA  03/20/23, 12:36 EDT

## 2023-03-21 NOTE — TELEPHONE ENCOUNTER
Caller: Rey Garrison Jr.    Relationship: Self    Best call back number:     Requested Prescriptions: Fluticasone-Umeclidin-Vilant (Trelegy Ellipta) 200-62.5-25 MCG/ACT aerosol powder        Pharmacy where request should be sent: [facility]/PHARMACY #[id number] - [address] [phone] [facility] [phone]      Last office visit with prescribing clinician: 11/1/2022   Last telemedicine visit with prescribing clinician: 5/1/2023   Next office visit with prescribing clinician: 5/1/2023     Additional details provided by patient: PATIENT IS OUT OF THIS MEDICATION AND WAS CHECKING THE STATUS OF THE CALL IN TO THE PHARMACY; PLEASE CALL TO ADVISE      Does the patient have less than a 3 day supply:  [x] Yes  [] No      Remberto Workman Rep   03/21/23 09:52 EDT

## 2023-04-10 DIAGNOSIS — C80.1 ANXIETY ASSOCIATED WITH CANCER DIAGNOSIS: ICD-10-CM

## 2023-04-10 DIAGNOSIS — F41.1 ANXIETY ASSOCIATED WITH CANCER DIAGNOSIS: ICD-10-CM

## 2023-04-10 RX ORDER — VENLAFAXINE HYDROCHLORIDE 37.5 MG/1
37.5 CAPSULE, EXTENDED RELEASE ORAL DAILY
Qty: 30 CAPSULE | Refills: 5 | Status: SHIPPED | OUTPATIENT
Start: 2023-04-10

## 2023-04-29 DIAGNOSIS — Z76.0 MEDICATION REFILL: ICD-10-CM

## 2023-04-29 DIAGNOSIS — J44.9 CHRONIC OBSTRUCTIVE PULMONARY DISEASE, UNSPECIFIED COPD TYPE: ICD-10-CM

## 2023-05-02 RX ORDER — ALBUTEROL SULFATE 90 UG/1
AEROSOL, METERED RESPIRATORY (INHALATION)
Qty: 18 G | Refills: 1 | Status: SHIPPED | OUTPATIENT
Start: 2023-05-02

## 2023-05-08 DIAGNOSIS — C80.1 ANXIETY ASSOCIATED WITH CANCER DIAGNOSIS: ICD-10-CM

## 2023-05-08 DIAGNOSIS — F41.1 ANXIETY ASSOCIATED WITH CANCER DIAGNOSIS: ICD-10-CM

## 2023-05-08 RX ORDER — VENLAFAXINE HYDROCHLORIDE 37.5 MG/1
37.5 CAPSULE, EXTENDED RELEASE ORAL DAILY
Qty: 30 CAPSULE | Refills: 5 | Status: SHIPPED | OUTPATIENT
Start: 2023-05-08

## 2023-05-30 RX ORDER — FINASTERIDE 5 MG/1
TABLET, FILM COATED ORAL
Qty: 90 TABLET | Refills: 3 | Status: SHIPPED | OUTPATIENT
Start: 2023-05-30

## 2023-06-09 DIAGNOSIS — F41.1 ANXIETY ASSOCIATED WITH CANCER DIAGNOSIS: ICD-10-CM

## 2023-06-09 DIAGNOSIS — C80.1 ANXIETY ASSOCIATED WITH CANCER DIAGNOSIS: ICD-10-CM

## 2023-06-09 RX ORDER — VENLAFAXINE HYDROCHLORIDE 37.5 MG/1
37.5 CAPSULE, EXTENDED RELEASE ORAL DAILY
Qty: 30 CAPSULE | Refills: 5 | Status: SHIPPED | OUTPATIENT
Start: 2023-06-09

## 2023-06-19 DIAGNOSIS — J44.9 CHRONIC OBSTRUCTIVE PULMONARY DISEASE, UNSPECIFIED COPD TYPE: ICD-10-CM

## 2023-06-19 DIAGNOSIS — Z76.0 MEDICATION REFILL: ICD-10-CM

## 2023-06-19 RX ORDER — ALBUTEROL SULFATE 90 UG/1
2 AEROSOL, METERED RESPIRATORY (INHALATION) EVERY 6 HOURS PRN
Qty: 18 G | Refills: 1 | Status: SHIPPED | OUTPATIENT
Start: 2023-06-19

## 2023-09-02 DIAGNOSIS — Z76.0 MEDICATION REFILL: ICD-10-CM

## 2023-09-02 DIAGNOSIS — J44.9 CHRONIC OBSTRUCTIVE PULMONARY DISEASE, UNSPECIFIED COPD TYPE: ICD-10-CM

## 2023-09-02 RX ORDER — ALBUTEROL SULFATE 90 UG/1
AEROSOL, METERED RESPIRATORY (INHALATION)
Qty: 8 G | Refills: 3 | Status: SHIPPED | OUTPATIENT
Start: 2023-09-02

## 2023-09-07 ENCOUNTER — TELEPHONE (OUTPATIENT)
Dept: FAMILY MEDICINE CLINIC | Facility: CLINIC | Age: 73
End: 2023-09-07

## 2023-09-07 NOTE — TELEPHONE ENCOUNTER
Caller: Rey Garrison Jr.    Relationship: Self    Best call back number: [phone]    What is the medical concern/diagnosis: DERMATOLOGY    What specialty or service is being requested: DERMATOLOGY      Any additional details: PATIENT WAS REFERRED TO A DERMATOLOGIST PREVIOUSLY AND PATIENT IS NOT SATISFIED WITH THAT PROVIDER. PLEASE REFER TO A DIFFERENT DERMATOLOGIST

## 2023-09-08 DIAGNOSIS — Z12.83 SKIN CANCER SCREENING: Primary | ICD-10-CM

## 2023-09-08 DIAGNOSIS — L21.9 SEBORRHEIC DERMATITIS: ICD-10-CM

## 2023-09-12 DIAGNOSIS — R79.89 LOW TESTOSTERONE IN MALE: ICD-10-CM

## 2023-09-12 DIAGNOSIS — E29.1 HYPOGONADISM IN MALE: ICD-10-CM

## 2023-09-12 RX ORDER — TESTOSTERONE CYPIONATE 200 MG/ML
INJECTION, SOLUTION INTRAMUSCULAR
Qty: 3 ML | Refills: 2 | Status: SHIPPED | OUTPATIENT
Start: 2023-09-12

## 2023-09-12 NOTE — TELEPHONE ENCOUNTER
Caller: Rey Garrison Jr.    Relationship: Self    Best call back number: [phone]    Requested Prescriptions:   Requested Prescriptions     Pending Prescriptions Disp Refills    Testosterone Cypionate (DEPOTESTOTERONE CYPIONATE) 200 MG/ML injection [Pharmacy Med Name: TESTOSTERONE  MG/ML] 3 mL 2     Sig: INJECT 1.5 ML EVERY 14 DAYS        Pharmacy where request should be sent: [facility]/PHARMACY #[id number] - [place], [place] - [facility] [phone] [facility] [phone]      Last office visit with prescribing clinician: 11/1/2022   Last telemedicine visit with prescribing clinician: Visit date not found   Next office visit with prescribing clinician: 11/3/2023     Additional details provided by patient: PATIENT IS OUT OF THE MEDICATION. HE SAID NOT TO WORRY ABOUT INSURANCE AND TO PLEASE SEND IT TO THE PHARMACY.     Does the patient have less than a 3 day supply:  [x] Yes  [] No    Would you like a call back once the refill request has been completed: [x] Yes [] No    If the office needs to give you a call back, can they leave a voicemail: [x] Yes [] No    Remberto Kaur Rep   09/12/23 10:27 EDT

## 2023-10-10 DIAGNOSIS — C80.1 ANXIETY ASSOCIATED WITH CANCER DIAGNOSIS: ICD-10-CM

## 2023-10-10 DIAGNOSIS — F41.1 ANXIETY ASSOCIATED WITH CANCER DIAGNOSIS: ICD-10-CM

## 2023-10-10 RX ORDER — VENLAFAXINE HYDROCHLORIDE 37.5 MG/1
37.5 CAPSULE, EXTENDED RELEASE ORAL DAILY
Qty: 30 CAPSULE | Refills: 5 | Status: SHIPPED | OUTPATIENT
Start: 2023-10-10

## 2023-11-03 ENCOUNTER — TELEPHONE (OUTPATIENT)
Dept: FAMILY MEDICINE CLINIC | Facility: CLINIC | Age: 73
End: 2023-11-03

## 2023-12-10 DIAGNOSIS — E29.1 HYPOGONADISM IN MALE: ICD-10-CM

## 2023-12-10 DIAGNOSIS — R79.89 LOW TESTOSTERONE IN MALE: ICD-10-CM

## 2023-12-11 RX ORDER — TESTOSTERONE CYPIONATE 200 MG/ML
INJECTION, SOLUTION INTRAMUSCULAR
Qty: 3 ML | Refills: 2 | Status: SHIPPED | OUTPATIENT
Start: 2023-12-11

## 2023-12-11 NOTE — TELEPHONE ENCOUNTER
Rx Refill Note  Requested Prescriptions     Pending Prescriptions Disp Refills    Testosterone Cypionate (DEPOTESTOTERONE CYPIONATE) 200 MG/ML injection [Pharmacy Med Name: TESTOSTERONE  MG/ML] 3 mL 2     Sig: INJECT 1.5 ML EVERY 14 DAYS      Last office visit with prescribing clinician: 11/1/2022   Last telemedicine visit with prescribing clinician: Visit date not found   Next office visit with prescribing clinician: Visit date not found                         Would you like a call back once the refill request has been completed: [] Yes [] No    If the office needs to give you a call back, can they leave a voicemail: [] Yes [] No    Elo Roman  12/11/23, 11:03 EST

## 2024-01-05 DIAGNOSIS — R79.89 LOW TESTOSTERONE IN MALE: ICD-10-CM

## 2024-01-05 DIAGNOSIS — E29.1 HYPOGONADISM IN MALE: ICD-10-CM

## 2024-01-05 RX ORDER — TESTOSTERONE CYPIONATE 200 MG/ML
INJECTION, SOLUTION INTRAMUSCULAR
Qty: 3 ML | Refills: 2 | OUTPATIENT
Start: 2024-01-05

## 2024-01-05 NOTE — TELEPHONE ENCOUNTER
Rx Refill Note  Requested Prescriptions     Pending Prescriptions Disp Refills    Testosterone Cypionate (DEPOTESTOTERONE CYPIONATE) 200 MG/ML injection 3 mL 2     Sig: INJECT 1.5 ML EVERY 14 DAYS      Last office visit with prescribing clinician: 11/1/2022   Last telemedicine visit with prescribing clinician: Visit date not found   Next office visit with prescribing clinician: Visit date not found                         Would you like a call back once the refill request has been completed: [] Yes [] No    If the office needs to give you a call back, can they leave a voicemail: [] Yes [] No    Viktoriya López LPN  01/05/24, 10:09 EST

## 2024-01-05 NOTE — TELEPHONE ENCOUNTER
Caller: Rey Garrison Jr.    Relationship: Self    Best call back number: [phone]    Requested Prescriptions:   Requested Prescriptions     Pending Prescriptions Disp Refills    Testosterone Cypionate (DEPOTESTOTERONE CYPIONATE) 200 MG/ML injection 3 mL 2     Sig: INJECT 1.5 ML EVERY 14 DAYS        Pharmacy where request should be sent: [facility]/PHARMACY #[id number] - [employer], [place] - [address] [facility] [phone] [place] [phone]      Last office visit with prescribing clinician: 11/1/2022   Last telemedicine visit with prescribing clinician: Visit date not found   Next office visit with prescribing clinician: Visit date not found     Additional details provided by patient: PATIENT IS COMPLETELY OUT    Does the patient have less than a 3 day supply:  [x] Yes  [] No    Would you like a call back once the refill request has been completed: [] Yes [x] No    If the office needs to give you a call back, can they leave a voicemail: [] Yes [x] No    Remberto Hogan Rep   01/05/24 08:48 EST

## 2024-01-09 DIAGNOSIS — E29.1 HYPOGONADISM IN MALE: ICD-10-CM

## 2024-01-09 DIAGNOSIS — R79.89 LOW TESTOSTERONE IN MALE: ICD-10-CM

## 2024-01-09 RX ORDER — TESTOSTERONE CYPIONATE 200 MG/ML
INJECTION, SOLUTION INTRAMUSCULAR
Qty: 3 ML | Refills: 2 | OUTPATIENT
Start: 2024-01-09

## 2024-01-09 NOTE — TELEPHONE ENCOUNTER
Caller: Rey Garrison Jr.    Relationship: Self    Best call back number: [phone]     Requested Prescriptions:   Requested Prescriptions     Pending Prescriptions Disp Refills    Testosterone Cypionate (DEPOTESTOTERONE CYPIONATE) 200 MG/ML injection 3 mL 2     Sig: INJECT 1.5 ML EVERY 14 DAYS        Pharmacy where request should be sent: [facility]/PHARMACY #[id number] - [place], [place] - [address] [facility] [phone] [place] [phone]      Last office visit with prescribing clinician: 11/1/2022   Last telemedicine visit with prescribing clinician: Visit date not found   Next office visit with prescribing clinician: Visit date not found     Additional details provided by patient: THIS NEEDS TO BE SENT TO THE [facility]. HE STILL HAS TWO REFILLS IN [place], [place] BECAUSE HE GOES BACK AND FORTH. HE NEEDS A PRESCRIPTION AT BOTH LOCATIONS. HE HAS BEEN OUT FOR A FEW DAYS AND NEEDS TO  IN [place] THIS TIME.    Does the patient have less than a 3 day supply:  [x] Yes  [] No    Would you like a call back once the refill request has been completed: [] Yes [x] No    If the office needs to give you a call back, can they leave a voicemail: [] Yes [x] No    Mirian Gant, PCT   01/09/24 09:26 EST

## 2024-01-16 ENCOUNTER — OFFICE VISIT (OUTPATIENT)
Dept: FAMILY MEDICINE CLINIC | Facility: CLINIC | Age: 74
End: 2024-01-16
Payer: MEDICARE

## 2024-01-16 VITALS
HEIGHT: 68 IN | TEMPERATURE: 97.3 F | BODY MASS INDEX: 28.1 KG/M2 | SYSTOLIC BLOOD PRESSURE: 124 MMHG | DIASTOLIC BLOOD PRESSURE: 72 MMHG | WEIGHT: 185.4 LBS

## 2024-01-16 DIAGNOSIS — E29.1 HYPOGONADISM IN MALE: Primary | ICD-10-CM

## 2024-01-16 DIAGNOSIS — Z51.81 THERAPEUTIC DRUG MONITORING: ICD-10-CM

## 2024-01-16 DIAGNOSIS — H66.001 NON-RECURRENT ACUTE SUPPURATIVE OTITIS MEDIA OF RIGHT EAR WITHOUT SPONTANEOUS RUPTURE OF TYMPANIC MEMBRANE: ICD-10-CM

## 2024-01-16 DIAGNOSIS — I10 ESSENTIAL HYPERTENSION: ICD-10-CM

## 2024-01-16 DIAGNOSIS — R79.89 LOW TESTOSTERONE IN MALE: ICD-10-CM

## 2024-01-16 RX ORDER — CEFDINIR 300 MG/1
300 CAPSULE ORAL 2 TIMES DAILY
Qty: 20 CAPSULE | Refills: 0 | Status: SHIPPED | OUTPATIENT
Start: 2024-01-16

## 2024-01-16 RX ORDER — TESTOSTERONE CYPIONATE 200 MG/ML
INJECTION, SOLUTION INTRAMUSCULAR
Qty: 3 ML | Refills: 2 | Status: SHIPPED | OUTPATIENT
Start: 2024-01-16

## 2024-01-16 NOTE — ASSESSMENT & PLAN NOTE
Signs symptoms consistent with right otitis media.  Will treat with cefdinir as patient is allergic to penicillin.  RTC/ED precautions given.

## 2024-01-16 NOTE — ASSESSMENT & PLAN NOTE
Chronic and stable.  Patient is tolerated testosterone injections well. The patient is taking the following controlled substance(s) on a long term (greater than three months) basis:  Testosterone .  He is taking controlled substances for other (hypogonadism).  A history was obtained from the patient and the following were reviewed: family history, social history, psychiatric history, and substance abuse history.  A baseline assessment was performed and includes a controlled substance agreement, urine drug screen, ABENA (within 12 months prior to today's encounter), and a physical exam, if appropriate, was performed within the past year.  It is medically appropriate to prescribe him the medication(s) above.  If applicable, prior treatment records were obtained and reviewed to justify long term prescribing of controlled substances.  The patient was educated on the following: Limited use of the medication, need to discontinue the medication when his condition resolves, proper storage and disposal of medication(s), and risks and dangers associated with controlled substances including tolerance, dependence, and addiction.  A written informed consent was obtained and includes objectives of treatment, further diagnostic testing if appropriate, and an exit strategy for discontinuing the medication on the condition resolves, if appropriate.  In addition, if appropriate, the patient will be screened for other medical conditions that may impact the prescribing of controlled substances.  Previous treatments have been tried including trials of noncontrolled substances or lower doses of controlled substances.  The controlled medication(s) have been titrated to the level appropriate and necessary and the medication(s) are not causing unacceptable side effects.  Patient will follow-up in 3 months for annual physical exam and we will obtain labs at that time.

## 2024-01-16 NOTE — PROGRESS NOTES
Rey Garrison Jr. is a 74 y.o. male who presents today for Earache (Right ear started a couple days ago.), Hypogonadism (Refill on testosterone.), and Hypertension      Patient has been taking testosterone 1.5ml every 14 days and tolerates this well. He has been taking his blood pressure at home and sees numbers similar to what we got here today. He has been taking medications as directed with no side effects. Patient has right ear pain for the last couple of days. It has stayed about the same. He denies fever, chills, drainage, fullness, nasal congestion, HA, or sinus pressure. He tried pouring peroxide down in his ear but this did not help.          Review of Systems   Constitutional:  Negative for fever and unexpected weight loss.   HENT:  Positive for ear pain. Negative for congestion and sore throat.    Eyes:  Negative for visual disturbance.   Respiratory:  Negative for cough, shortness of breath and wheezing.    Cardiovascular:  Negative for chest pain and palpitations.   Gastrointestinal:  Negative for abdominal pain, blood in stool, constipation, diarrhea, nausea, vomiting and GERD.   Endocrine: Negative for polydipsia and polyuria.   Genitourinary:  Negative for difficulty urinating.   Musculoskeletal:  Negative for joint swelling.   Skin:  Negative for rash and skin lesions.   Allergic/Immunologic: Negative for environmental allergies.   Neurological:  Negative for seizures and syncope.   Hematological:  Does not bruise/bleed easily.   Psychiatric/Behavioral:  Negative for suicidal ideas.         The following portions of the patient's history were reviewed and updated as appropriate: allergies, current medications, past family history, past medical history, past social history, past surgical history and problem list.    Current Outpatient Medications on File Prior to Visit   Medication Sig Dispense Refill    albuterol sulfate  (90 Base) MCG/ACT inhaler INHALE 2 PUFFS EVERY 6 HOURS AS NEEDED  FOR WHEEZING 8 g 3    apixaban (ELIQUIS) 5 MG tablet tablet 1 tablet Every 12 (Twelve) Hours. 60 tablet     doxazosin (CARDURA) 4 MG tablet TAKE 2 TABLETS BY MOUTH DAILY 180 tablet 2    famotidine (PEPCID) 20 MG tablet 20 mg.      finasteride (PROSCAR) 5 MG tablet TAKE 1 TABLET BY MOUTH EVERY DAY 90 tablet 3    Fluticasone-Umeclidin-Vilant (Trelegy Ellipta) 200-62.5-25 MCG/ACT aerosol powder  Inhale 1 puff Daily. 60 each 11    ketoconazole (NIZORAL) 2 % cream Apply to appropriate area twice daily 45 g 5    Multiple Vitamin (MULTI-VITAMIN DAILY PO) Take 1 tablet by mouth Daily.      sildenafil (VIAGRA) 100 MG tablet Take 1 tablet by mouth Daily As Needed for Erectile Dysfunction. 15 tablet 5    tiZANidine (ZANAFLEX) 4 MG tablet TAKE 1 TABLET BY MOUTH THREE TIMES A DAY AS NEEDED FOR MUSCLE SPASM 270 tablet 0    triamcinolone (KENALOG) 0.5 % cream Apply 1 application topically to the appropriate area as directed 2 (Two) Times a Day. 30 g 3    [DISCONTINUED] Testosterone Cypionate (DEPOTESTOTERONE CYPIONATE) 200 MG/ML injection INJECT 1.5 ML EVERY 14 DAYS 3 mL 2    [DISCONTINUED] Cholecalciferol (Vitamin D3) 1.25 MG (97722 UT) capsule 50 mcg. (Patient not taking: Reported on 1/16/2024)      [DISCONTINUED] venlafaxine XR (EFFEXOR-XR) 37.5 MG 24 hr capsule Take 1 capsule by mouth Daily. (Patient not taking: Reported on 1/16/2024) 30 capsule 5     No current facility-administered medications on file prior to visit.       Allergies   Allergen Reactions    Alprazolam Hallucinations    Penicillin G Unknown - Low Severity    Sulfamethoxazole-Trimethoprim Hallucinations    Penicillamine Unknown (See Comments)     As a child     Penicillins Itching     Causes both the hands and the feet to itch     Sulfa Antibiotics Itching     Causes both the hands and the feet to itch    Causes both the hands and the feet to itch    Other reaction(s): Itching, Rash        Visit Vitals  /72   Temp 97.3 °F (36.3 °C) (Temporal)   Ht 172.7  "cm (67.99\")   Wt 84.1 kg (185 lb 6.4 oz)   BMI 28.20 kg/m²        Physical Exam  Constitutional:       General: He is not in acute distress.     Appearance: He is well-developed. He is not diaphoretic.   HENT:      Head: Atraumatic.      Right Ear: Drainage and tenderness present. A middle ear effusion is present. Tympanic membrane is erythematous. Tympanic membrane is not perforated.      Left Ear: No drainage or tenderness.  No middle ear effusion. Tympanic membrane is not perforated or erythematous.   Cardiovascular:      Rate and Rhythm: Normal rate and regular rhythm.      Heart sounds: Normal heart sounds. No murmur heard.     No friction rub. No gallop.   Pulmonary:      Effort: Pulmonary effort is normal. No respiratory distress.      Breath sounds: Normal breath sounds. No stridor. No wheezing, rhonchi or rales.   Musculoskeletal:      Cervical back: Normal range of motion and neck supple.   Skin:     General: Skin is warm and dry.   Neurological:      Mental Status: He is alert and oriented to person, place, and time.   Psychiatric:         Behavior: Behavior normal.          Results for orders placed or performed in visit on 11/01/22   Comprehensive Metabolic Panel    Specimen: Blood   Result Value Ref Range    Glucose 95 65 - 99 mg/dL    BUN 17 8 - 23 mg/dL    Creatinine 0.86 0.76 - 1.27 mg/dL    Sodium 140 136 - 145 mmol/L    Potassium 4.1 3.5 - 5.2 mmol/L    Chloride 102 98 - 107 mmol/L    CO2 29.1 (H) 22.0 - 29.0 mmol/L    Calcium 9.4 8.6 - 10.5 mg/dL    Total Protein 7.1 6.0 - 8.5 g/dL    Albumin 4.50 3.50 - 5.20 g/dL    ALT (SGPT) 26 1 - 41 U/L    AST (SGOT) 24 1 - 40 U/L    Alkaline Phosphatase 60 39 - 117 U/L    Total Bilirubin 0.2 0.0 - 1.2 mg/dL    Globulin 2.6 gm/dL    A/G Ratio 1.7 g/dL    BUN/Creatinine Ratio 19.8 7.0 - 25.0    Anion Gap 8.9 5.0 - 15.0 mmol/L    eGFR 93.1 >60.0 mL/min/1.73   Lipid Panel    Specimen: Blood   Result Value Ref Range    Total Cholesterol 215 (H) 0 - 200 mg/dL    " Triglycerides 86 0 - 150 mg/dL    HDL Cholesterol 71 (H) 40 - 60 mg/dL    LDL Cholesterol  129 (H) 0 - 100 mg/dL    VLDL Cholesterol 15 5 - 40 mg/dL    LDL/HDL Ratio 1.79    Hemoglobin A1c    Specimen: Blood   Result Value Ref Range    Hemoglobin A1C 5.70 (H) 4.80 - 5.60 %   PSA Screen    Specimen: Blood   Result Value Ref Range    PSA 0.541 0.000 - 4.000 ng/mL   Microalbumin / Creatinine Urine Ratio - Urine, Clean Catch    Specimen: Urine, Clean Catch   Result Value Ref Range    Microalbumin/Creatinine Ratio      Creatinine, Urine 80.6 mg/dL    Microalbumin, Urine <1.2 mg/dL   CBC Auto Differential    Specimen: Blood   Result Value Ref Range    WBC 7.01 3.40 - 10.80 10*3/mm3    RBC 5.37 4.14 - 5.80 10*6/mm3    Hemoglobin 16.8 13.0 - 17.7 g/dL    Hematocrit 49.3 37.5 - 51.0 %    MCV 91.8 79.0 - 97.0 fL    MCH 31.3 26.6 - 33.0 pg    MCHC 34.1 31.5 - 35.7 g/dL    RDW 13.8 12.3 - 15.4 %    RDW-SD 46.1 37.0 - 54.0 fl    MPV 10.5 6.0 - 12.0 fL    Platelets 192 140 - 450 10*3/mm3    Neutrophil % 61.9 42.7 - 76.0 %    Lymphocyte % 29.0 19.6 - 45.3 %    Monocyte % 6.1 5.0 - 12.0 %    Eosinophil % 2.0 0.3 - 6.2 %    Basophil % 0.7 0.0 - 1.5 %    Immature Grans % 0.3 0.0 - 0.5 %    Neutrophils, Absolute 4.34 1.70 - 7.00 10*3/mm3    Lymphocytes, Absolute 2.03 0.70 - 3.10 10*3/mm3    Monocytes, Absolute 0.43 0.10 - 0.90 10*3/mm3    Eosinophils, Absolute 0.14 0.00 - 0.40 10*3/mm3    Basophils, Absolute 0.05 0.00 - 0.20 10*3/mm3    Immature Grans, Absolute 0.02 0.00 - 0.05 10*3/mm3    nRBC 0.0 0.0 - 0.2 /100 WBC        Problems Addressed this Visit          Cardiac and Vasculature    Essential hypertension     Hypertension is unchanged.  Continue current treatment regimen.  Blood pressure will be reassessed in 3 months.            ENT    Non-recurrent acute suppurative otitis media of right ear without spontaneous rupture of tympanic membrane     Signs symptoms consistent with right otitis media.  Will treat with cefdinir as  patient is allergic to penicillin.  RTC/ED precautions given.         Relevant Medications    cefdinir (OMNICEF) 300 MG capsule       Endocrine and Metabolic    Hypogonadism in male - Primary     Chronic and stable.  Patient is tolerated testosterone injections well. The patient is taking the following controlled substance(s) on a long term (greater than three months) basis:  Testosterone .  He is taking controlled substances for other (hypogonadism).  A history was obtained from the patient and the following were reviewed: family history, social history, psychiatric history, and substance abuse history.  A baseline assessment was performed and includes a controlled substance agreement, urine drug screen, ABENA (within 12 months prior to today's encounter), and a physical exam, if appropriate, was performed within the past year.  It is medically appropriate to prescribe him the medication(s) above.  If applicable, prior treatment records were obtained and reviewed to justify long term prescribing of controlled substances.  The patient was educated on the following: Limited use of the medication, need to discontinue the medication when his condition resolves, proper storage and disposal of medication(s), and risks and dangers associated with controlled substances including tolerance, dependence, and addiction.  A written informed consent was obtained and includes objectives of treatment, further diagnostic testing if appropriate, and an exit strategy for discontinuing the medication on the condition resolves, if appropriate.  In addition, if appropriate, the patient will be screened for other medical conditions that may impact the prescribing of controlled substances.  Previous treatments have been tried including trials of noncontrolled substances or lower doses of controlled substances.  The controlled medication(s) have been titrated to the level appropriate and necessary and the medication(s) are not causing  unacceptable side effects.  Patient will follow-up in 3 months for annual physical exam and we will obtain labs at that time.           Relevant Medications    Testosterone Cypionate (DEPOTESTOTERONE CYPIONATE) 200 MG/ML injection    Other Relevant Orders    Compliance Drug Analysis, Ur - Urine, Clean Catch    Low testosterone in male    Relevant Medications    Testosterone Cypionate (DEPOTESTOTERONE CYPIONATE) 200 MG/ML injection    Other Relevant Orders    Compliance Drug Analysis, Ur - Urine, Clean Catch       Health Encounters    Therapeutic drug monitoring    Relevant Orders    Compliance Drug Analysis, Ur - Urine, Clean Catch     Diagnoses         Codes Comments    Hypogonadism in male    -  Primary ICD-10-CM: E29.1  ICD-9-CM: 257.2     Low testosterone in male     ICD-10-CM: R79.89  ICD-9-CM: 790.99     Therapeutic drug monitoring     ICD-10-CM: Z51.81  ICD-9-CM: V58.83     Non-recurrent acute suppurative otitis media of right ear without spontaneous rupture of tympanic membrane     ICD-10-CM: H66.001  ICD-9-CM: 382.00     Essential hypertension     ICD-10-CM: I10  ICD-9-CM: 401.9             Return in about 3 months (around 4/16/2024) for Medicare Wellness.    Rory Ayers MD   1/16/2024

## 2024-01-21 LAB — DRUGS UR: NORMAL

## 2024-01-24 DIAGNOSIS — Z76.0 MEDICATION REFILL: ICD-10-CM

## 2024-01-24 DIAGNOSIS — I10 ESSENTIAL HYPERTENSION: ICD-10-CM

## 2024-01-24 DIAGNOSIS — J44.9 CHRONIC OBSTRUCTIVE PULMONARY DISEASE, UNSPECIFIED COPD TYPE: ICD-10-CM

## 2024-01-24 RX ORDER — DOXAZOSIN MESYLATE 4 MG/1
8 TABLET ORAL DAILY
Qty: 180 TABLET | Refills: 2 | Status: SHIPPED | OUTPATIENT
Start: 2024-01-24

## 2024-01-24 RX ORDER — FLUTICASONE FUROATE, UMECLIDINIUM BROMIDE AND VILANTEROL TRIFENATATE 200; 62.5; 25 UG/1; UG/1; UG/1
1 POWDER RESPIRATORY (INHALATION) DAILY
Qty: 60 EACH | Refills: 3 | Status: SHIPPED | OUTPATIENT
Start: 2024-01-24

## 2024-01-24 RX ORDER — VENLAFAXINE HYDROCHLORIDE 37.5 MG/1
37.5 CAPSULE, EXTENDED RELEASE ORAL DAILY
Qty: 30 CAPSULE | Refills: 4 | Status: SHIPPED | OUTPATIENT
Start: 2024-01-24

## 2024-01-24 NOTE — TELEPHONE ENCOUNTER
Rx Refill Note  Requested Prescriptions     Pending Prescriptions Disp Refills    venlafaxine XR (EFFEXOR-XR) 37.5 MG 24 hr capsule [Pharmacy Med Name: VENLAFAXINE HCL ER 37.5 MG CAP] 30 capsule 4     Sig: TAKE 1 CAPSULE BY MOUTH EVERY DAY    Trelegy Ellipta 200-62.5-25 MCG/ACT aerosol powder  [Pharmacy Med Name: TRELEGY ELLIPTA 200-62.5-25] 60 each 3     Sig: INHALE 1 PUFF DAILY    doxazosin (CARDURA) 4 MG tablet [Pharmacy Med Name: DOXAZOSIN MESYLATE 4 MG TAB] 180 tablet 2     Sig: TAKE 2 TABLETS BY MOUTH DAILY      Last office visit with prescribing clinician: 1/16/2024   Last telemedicine visit with prescribing clinician: Visit date not found   Next office visit with prescribing clinician: 5/17/2024                         Would you like a call back once the refill request has been completed: [] Yes [] No    If the office needs to give you a call back, can they leave a voicemail: [] Yes [] No    Viktoriya López LPN  01/24/24, 16:40 EST

## 2024-02-06 RX ORDER — SILDENAFIL 100 MG/1
100 TABLET, FILM COATED ORAL DAILY PRN
Qty: 15 TABLET | Refills: 5 | Status: SHIPPED | OUTPATIENT
Start: 2024-02-06

## 2024-02-06 NOTE — TELEPHONE ENCOUNTER
Caller: Rey Garrison Jr.    Relationship: Self    Best call back number: [phone]     Requested Prescriptions:   Requested Prescriptions     Pending Prescriptions Disp Refills    sildenafil (VIAGRA) 100 MG tablet 15 tablet 5     Sig: Take 1 tablet by mouth Daily As Needed for Erectile Dysfunction.        Pharmacy where request should be sent: [facility]Y #[id number] - [place], [place] - [address] [phone] [facility] [phone] FX     Last office visit with prescribing clinician: 1/16/2024   Last telemedicine visit with prescribing clinician: Visit date not found   Next office visit with prescribing clinician: 5/17/2024     Additional details provided by patient: HE IS OUT OF THIS MEDICATION    Does the patient have less than a 3 day supply:  [x] Yes  [] No    Would you like a call back once the refill request has been completed: [] Yes [x] No    If the office needs to give you a call back, can they leave a voicemail: [] Yes [x] No    Mirian Gant, PCT   02/06/24 10:52 EST

## 2024-02-14 DIAGNOSIS — H66.001 NON-RECURRENT ACUTE SUPPURATIVE OTITIS MEDIA OF RIGHT EAR WITHOUT SPONTANEOUS RUPTURE OF TYMPANIC MEMBRANE: ICD-10-CM

## 2024-02-14 RX ORDER — CEFDINIR 300 MG/1
300 CAPSULE ORAL 2 TIMES DAILY
Qty: 20 CAPSULE | Refills: 0 | OUTPATIENT
Start: 2024-02-14

## 2024-02-20 NOTE — TELEPHONE ENCOUNTER
Caller: Rey Garrison Jr.    Relationship: Self    Best call back number:  [phone]       What orders are you requesting (i.e. lab or imaging): HOME HEALTH CARE FOR EVALUATION     In what timeframe would the patient need to come in: AS SOON AS POSSIBLE     Additional notes: PATIENT STATES HE WAS AT [facility] AND THEY REMOVED A MASS FROM HIS LUNG    DR PENALOZA WAS HIS SURGEON   HE THEN WENT TO [facility] FOR REHILBATION FOR 1 WEEK      PATIENT WANTS TO MAKE SURE HIS RECORDS ARE SENT TO THE OFFICE FROM [facility] AND [facility]     PATIENT IS NOT ABLE TO COME INTO THE OFFICE FOR A HOSPITAL FOLLOW UP VISIT BUT HE WOULD LIKE TO DO A VIRTUAL VISIT WITH DR CATRINA MAZARIEGOS    PLEASE CALL BECAUSE PATIENT WANTS TO DO THE VISIT SOON               Present, unchanged

## 2024-03-06 ENCOUNTER — TELEPHONE (OUTPATIENT)
Dept: FAMILY MEDICINE CLINIC | Facility: CLINIC | Age: 74
End: 2024-03-06

## 2024-03-06 NOTE — TELEPHONE ENCOUNTER
Caller: Rey Garrison Jr.    Relationship: Self    Best call back number: [phone]     What is the medical concern/diagnosis: USE OF LEGS    What specialty or service is being requested: NEUROLOGIST    What is the provider, practice or medical service name: DR DENNIS FALK    What is the office location: [address] [place] [address]    What is the office phone number: [phone]    Any additional details: NEED A REFERRAL FOR NEUROLOGIST DR DENNIS FALK.PATIENT STATED THEIR OFFICE MENTIONED AN MRI BUT SAID IF PCP CALLED THEIR OFFICE THEY CAN ARRANGE ALL OF THAT

## 2024-03-07 DIAGNOSIS — M47.12 CERVICAL SPONDYLOSIS WITH MYELOPATHY: Primary | ICD-10-CM

## 2024-03-07 DIAGNOSIS — M54.12 CERVICAL MYELOPATHY WITH CERVICAL RADICULOPATHY: ICD-10-CM

## 2024-03-07 DIAGNOSIS — G95.9 CERVICAL MYELOPATHY WITH CERVICAL RADICULOPATHY: ICD-10-CM

## 2024-03-13 ENCOUNTER — OFFICE VISIT (OUTPATIENT)
Dept: FAMILY MEDICINE CLINIC | Facility: CLINIC | Age: 74
End: 2024-03-13
Payer: MEDICARE

## 2024-03-13 VITALS
BODY MASS INDEX: 27.28 KG/M2 | OXYGEN SATURATION: 96 % | SYSTOLIC BLOOD PRESSURE: 120 MMHG | DIASTOLIC BLOOD PRESSURE: 64 MMHG | TEMPERATURE: 98 F | HEIGHT: 68 IN | WEIGHT: 180 LBS | HEART RATE: 67 BPM

## 2024-03-13 DIAGNOSIS — S50.861A INSECT BITE OF RIGHT FOREARM, INITIAL ENCOUNTER: Primary | ICD-10-CM

## 2024-03-13 DIAGNOSIS — H60.543 ECZEMA OF BOTH EXTERNAL EARS: ICD-10-CM
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
## 2024-03-13 DIAGNOSIS — D12.6 ADENOMATOUS POLYP OF COLON, UNSPECIFIED PART OF COLON: ICD-10-CM

## 2024-03-13 RX ORDER — SODIUM, POTASSIUM,MAG SULFATES 17.5-3.13G
SOLUTION, RECONSTITUTED, ORAL ORAL
COMMUNITY

## 2024-03-13 RX ORDER — DOXYCYCLINE HYCLATE 100 MG/1
100 CAPSULE ORAL 2 TIMES DAILY
Qty: 20 CAPSULE | Refills: 0 | Status: SHIPPED | OUTPATIENT
Start: 2024-03-13

## 2024-03-13 RX ORDER — FLUOCINOLONE ACETONIDE 0.11 MG/ML
OIL AURICULAR (OTIC) 2 TIMES DAILY
Qty: 20 ML | Refills: 5 | Status: SHIPPED | OUTPATIENT
Start: 2024-03-13

## 2024-03-13 NOTE — PROGRESS NOTES
Follow Up Office Visit      Date: 2024   Patient Name: Rey Garrison Jr.  : 1950   MRN: [id number]     Chief Complaint:    Chief Complaint   Patient presents with    Rash     Wants to know if he needs colonoscopy.       History of Present Illness: Rey Garrison Jr. is a 74 y.o. male who is here today to follow up.    The patient is a 74-year-old male who presents for evaluation of multiple medical concerns.    He has a lesion on his legs and buttocks which looks like a spider bite. It is itchy and painful. He denies any fever. He applied Neosporin with a Band-Aid which seemed to help some.    He wants his ear and top of his head checked. He saw a dermatologist who gave him some creams, but nothing has worked. They froze it twice and gave him some cream. It cleared up for a while behind his ear. He has used ketoconazole.    His last colonoscopy was on 2018, and he had a couple of polyps.    He has a history of lung cancer.    He is allergic to SULFA and PENICILLIN.    He had a boil on his eye and was sick for 3 to 4 days and did not get out of bed.    Subjective      Review of systems:  Review of Systems         I have reviewed and the following portions of the patient's history were updated as appropriate: past family history, past medical history, past social history, past surgical history and problem list.    Medications:     Current Outpatient Medications:     albuterol sulfate  (90 Base) MCG/ACT inhaler, INHALE 2 PUFFS EVERY 6 HOURS AS NEEDED FOR WHEEZING, Disp: 8 g, Rfl: 3    apixaban (ELIQUIS) 5 MG tablet tablet, Take 1 tablet by mouth Every 12 (Twelve) Hours., Disp: 60 tablet, Rfl: 1    doxazosin (CARDURA) 4 MG tablet, TAKE 2 TABLETS BY MOUTH DAILY, Disp: 180 tablet, Rfl: 2    famotidine (PEPCID) 20 MG tablet, 1 tablet., Disp: , Rfl:     finasteride (PROSCAR) 5 MG tablet, TAKE 1 TABLET BY MOUTH EVERY DAY, Disp: 90 tablet, Rfl: 3    sildenafil (VIAGRA) 100 MG tablet,  "Take 1 tablet by mouth Daily As Needed for Erectile Dysfunction., Disp: 15 tablet, Rfl: 5    Testosterone Cypionate (DEPOTESTOTERONE CYPIONATE) 200 MG/ML injection, INJECT 1.5 ML EVERY 14 DAYS, Disp: 3 mL, Rfl: 2    Trelegy Ellipta 200-62.5-25 MCG/ACT aerosol powder , INHALE 1 PUFF DAILY, Disp: 60 each, Rfl: 3    doxycycline (VIBRAMYCIN) 100 MG capsule, Take 1 capsule by mouth 2 (Two) Times a Day., Disp: 20 capsule, Rfl: 0    fluocinolone acetonide (DERMOTIC) 0.01 % oil otic oil, Administer  into both ears 2 (Two) Times a Day., Disp: 20 mL, Rfl: 5    ketoconazole (NIZORAL) 2 % cream, Apply to appropriate area twice daily (Patient not taking: Reported on 3/13/2024), Disp: 45 g, Rfl: 5    Multiple Vitamin (MULTI-VITAMIN DAILY PO), Take 1 tablet by mouth Daily. (Patient not taking: Reported on 3/13/2024), Disp: , Rfl:     sodium-potassium-magnesium sulfates (SUPREP) 17.5-3.13-1.6 GM/177ML solution oral solution, , Disp: , Rfl:     tiZANidine (ZANAFLEX) 4 MG tablet, TAKE 1 TABLET BY MOUTH THREE TIMES A DAY AS NEEDED FOR MUSCLE SPASM (Patient not taking: Reported on 3/13/2024), Disp: 270 tablet, Rfl: 0    triamcinolone (KENALOG) 0.5 % cream, Apply 1 application topically to the appropriate area as directed 2 (Two) Times a Day. (Patient not taking: Reported on 3/13/2024), Disp: 30 g, Rfl: 3    Allergies:   Allergies   Allergen Reactions    Alprazolam Hallucinations    Penicillin G Unknown - Low Severity    Sulfamethoxazole-Trimethoprim Hallucinations    Penicillamine Unknown (See Comments)     As a child     Penicillins Itching     Causes both the hands and the feet to itch     Sulfa Antibiotics Itching     Causes both the hands and the feet to itch    Causes both the hands and the feet to itch    Other reaction(s): Itching, Rash       Objective     Vital Signs:   Vitals:    03/13/24 1011   BP: 120/64   Pulse: 67   Temp: 98 °F (36.7 °C)   SpO2: 96%   Weight: 81.6 kg (180 lb)   Height: 172.7 cm (67.99\")     Body mass index " is 27.38 kg/m².          Physical Exam:   Physical Exam  Vitals and nursing note reviewed.   Constitutional:       Appearance: Normal appearance.   HENT:      Head: Normocephalic and atraumatic.      Right Ear: Tympanic membrane normal.      Left Ear: Tympanic membrane normal.      Ears:      Comments: Dermatitis of bilateral ear canals present.      Nose: No congestion or rhinorrhea.      Mouth/Throat:      Mouth: Mucous membranes are moist.      Pharynx: Oropharynx is clear. No posterior oropharyngeal erythema.   Cardiovascular:      Rate and Rhythm: Normal rate and regular rhythm.   Pulmonary:      Effort: Pulmonary effort is normal.      Breath sounds: Normal breath sounds. No decreased breath sounds, wheezing, rhonchi or rales.   Musculoskeletal:      Cervical back: Neck supple.      Right lower leg: No edema.      Left lower leg: No edema.   Lymphadenopathy:      Cervical: No cervical adenopathy.   Skin:     Comments: Right forearm with erythema, central bite area. No fluctuance or abscess noted.   Neurological:      Mental Status: He is alert.          Procedures     Assessment / Plan      Assessment/Plan:   Diagnoses and all orders for this visit:    1. Insect bite of right forearm, initial encounter (Primary)  -     doxycycline (VIBRAMYCIN) 100 MG capsule; Take 1 capsule by mouth 2 (Two) Times a Day.  Dispense: 20 capsule; Refill: 0    2. Eczema of both external ears  -     fluocinolone acetonide (DERMOTIC) 0.01 % oil otic oil; Administer  into both ears 2 (Two) Times a Day.  Dispense: 20 mL; Refill: 5    3. Adenomatous polyp of colon, unspecified part of colon  -     Ambulatory Referral For Screening Colonoscopy         1. Spider bite.  Doxycycline as directed. Also, we will get a refill on the DermOtic oil for him. He will monitor these areas carefully and let me know if any problems.    2. Health maintenance.  It looks like he is going to be due for a colonoscopy in 05/2024. His last colonoscopy 5 years  ago showed several polyps, so I have put that referral in there for him as well.      Follow Up:   No follow-ups on file.    He will follow up as needed.    Annabel Bolaños PA-C   [facility]    Transcribed from ambient dictation for Annabel Bolaños PA-C by Lea Fischer.  03/13/24   11:02 EDT    Patient or patient representative verbalized consent to the visit recording.  I have personally performed the services described in this document as transcribed by the above individual, and it is both accurate and complete.

## 2024-04-22 ENCOUNTER — OFFICE VISIT (OUTPATIENT)
Dept: NEUROSURGERY | Facility: CLINIC | Age: 74
End: 2024-04-22
Payer: MEDICARE

## 2024-04-22 VITALS
DIASTOLIC BLOOD PRESSURE: 78 MMHG | TEMPERATURE: 97.1 F | WEIGHT: 179 LBS | BODY MASS INDEX: 27.13 KG/M2 | HEIGHT: 68 IN | SYSTOLIC BLOOD PRESSURE: 100 MMHG

## 2024-04-22 DIAGNOSIS — G95.9 CERVICAL MYELOPATHY WITH CERVICAL RADICULOPATHY: ICD-10-CM

## 2024-04-22 DIAGNOSIS — M47.12 CERVICAL SPONDYLOSIS WITH MYELOPATHY: Primary | ICD-10-CM

## 2024-04-22 DIAGNOSIS — M54.12 CERVICAL MYELOPATHY WITH CERVICAL RADICULOPATHY: ICD-10-CM

## 2024-04-22 PROCEDURE — 3078F DIAST BP <80 MM HG: CPT | Performed by: PHYSICIAN ASSISTANT

## 2024-04-22 PROCEDURE — 99213 OFFICE O/P EST LOW 20 MIN: CPT | Performed by: PHYSICIAN ASSISTANT

## 2024-04-22 PROCEDURE — 3074F SYST BP LT 130 MM HG: CPT | Performed by: PHYSICIAN ASSISTANT

## 2024-04-22 NOTE — PROGRESS NOTES
Patient: Rey Garrison Jr.  : 1950    Primary Care Provider: Rory Ayers MD    Chief Complaint: with lower extremities difficulty      history of Present Illness:       Patient is a very nice 74-year-old gentleman known to the neurosurgical practice for having cervical spondylitic myelopathy and requiring a C5-C7 anterior cervical discectomy and fusion.  Procedure without event.  Patient maintained some issues with myelopathic symptoms and has noticed over the last couple years he has developed some worsening proprioception of his lower extremities feeling as though he is having to throw his feet forward.  Patient has no pain and no radicular symptoms.    Patient did have a critical illness with his lung resection requiring a rehab stabilization after asystole and CODE BLUE and a 7-day intubation.    Discussed with him he could have a critical illness neuropathy but we will get an EMG to double check  Review of Systems   Constitutional:  Negative for activity change, appetite change, chills, diaphoresis, fatigue, fever and unexpected weight change.   HENT:  Negative for congestion, dental problem, drooling, ear discharge, ear pain, facial swelling, hearing loss, mouth sores, nosebleeds, postnasal drip, rhinorrhea, sinus pressure, sinus pain, sneezing, sore throat, tinnitus, trouble swallowing and voice change.    Eyes:  Negative for photophobia, pain, discharge, redness, itching and visual disturbance.   Respiratory:  Negative for apnea, cough, choking, chest tightness, shortness of breath, wheezing and stridor.    Cardiovascular:  Negative for chest pain, palpitations and leg swelling.   Gastrointestinal:  Negative for abdominal distention, abdominal pain, anal bleeding, blood in stool, constipation, diarrhea, nausea, rectal pain and vomiting.   Endocrine: Negative for cold intolerance, heat intolerance, polydipsia, polyphagia and polyuria.   Genitourinary:  Negative for decreased urine volume,  difficulty urinating, dysuria, enuresis, flank pain, frequency, genital sores, hematuria, penile discharge, penile pain, penile swelling, scrotal swelling, testicular pain and urgency.   Musculoskeletal:  Positive for gait problem. Negative for arthralgias, back pain, joint swelling, myalgias, neck pain and neck stiffness.   Skin:  Negative for color change, pallor, rash and wound.   Allergic/Immunologic: Negative for environmental allergies, food allergies and immunocompromised state.   Neurological:  Negative for dizziness, tremors, seizures, syncope, facial asymmetry, speech difficulty, weakness, light-headedness, numbness and headaches.   Hematological:  Negative for adenopathy. Does not bruise/bleed easily.   Psychiatric/Behavioral:  Negative for agitation, behavioral problems, confusion, decreased concentration, dysphoric mood, hallucinations, self-injury, sleep disturbance and suicidal ideas. The patient is not nervous/anxious and is not hyperactive.        Past Medical History:     Past Medical History:   Diagnosis Date    Asthma     Benign prostatic hypertrophy     Cervical disc disorder     Claustrophobia     COPD (chronic obstructive pulmonary disease)     Coughing up blood     History of    Depression with anxiety     Dermatitis     Hearing loss     Hiatal hernia     History of acute pharyngitis     History of histoplasmosis     Hypertension     Left inguinal hernia     Malignant neoplasm of lower lobe, right bronchus or lung 04/15/2022    Mediastinal lymphadenopathy     Myocardial infarction     Sciatica of right side     Tobacco abuse     Type 2 diabetes mellitus        Family History:     Family History   Problem Relation Age of Onset    Dementia Mother     Prostate cancer Father     Cancer Father     Hearing loss Father        Social History:    reports that he quit smoking about 2 years ago. His smoking use included cigarettes. He started smoking about 57 years ago. He has never used smokeless  "tobacco. He reports that he does not currently use alcohol after a past usage of about 5.0 standard drinks of alcohol per week. He reports current drug use. Frequency: 2.00 times per week. Drug: Marijuana.   SMOKING STATUS: Non-smoker    Surgical History:     Past Surgical History:   Procedure Laterality Date    ANTERIOR CERVICAL DISCECTOMY W/ FUSION Bilateral 10/19/2018    Procedure: CERVICAL DISCECTOMY ANTERIOR WITH FUSION C5-7;  Surgeon: Ata Stoddard MD;  Location: [facility];  Service: Neurosurgery    COLONOSCOPY  2020?    COLONOSCOPY W/ POLYPECTOMY      Pathology consistent with hyperplastic polyp    HERNIA REPAIR      INGUINAL HERNIA REPAIR  2014    KNEE ARTHROSCOPY W/ MENISCAL REPAIR      Medial and lateral meniscus repair    LUNG CANCER SURGERY Right 04/2022    NASAL POLYP EXCISION      NECK SURGERY         Allergies:   Alprazolam, Penicillin g, Sulfamethoxazole-trimethoprim, Penicillamine, Penicillins, and Sulfa antibiotics    Physical Exam:    Vital Signs:/78 (BP Location: Left arm, Patient Position: Sitting, Cuff Size: Adult)   Temp 97.1 °F (36.2 °C) (Infrared)   Ht 172.7 cm (68\")   Wt 81.2 kg (179 lb)   BMI 27.22 kg/m²    BMI: Body mass index is 27.22 kg/m².     GENERAL:   The patient is in no acute distress, and is able to answer all questions appropriately.  Skin:  The incision is well-healed and well approximated.  No signs of infection, bleeding, or erythema.  Musculoskeletal:     strength is 5 out of 5 bilaterally.   Shoulder abduction is 5 out of 5.    Dorsiflexion is 5/5 Bilaterally  Plantarflexion is 5/5 bilaterally  Hip Flexion 5/5 bilaterally.    The patient´s gait is normal without antalgia.  Neurologic:   The patient is alert and oriented by 3.     Pupils are equal and reactive to light.    Visual fields are full.    Extraocular movements are intact without nystagmus.    There is no evidence of central motor drift. No facial droop.  No difficulty with rapid alternating " "movements.    Sensation is equal bilaterally with no deficit.      Reflexes:  4+ @ biceps, triceps, brachioradialis, as well as the patellar and Achilles tendon bilaterally.  Very brisk Andrés's worse on left than right.  Left side jerks all the way up his arm      Medical Decision Making    Data Review:   No new films reviewed at this visit    Diagnosis:   Decreased proprioception lower extremities  Cervical myelopathy  History of lung cancer    Treatment Options:   Patient has multiple reasons why he could potentially have issues with his proprioception of his lower extremities 1 and the most important likely being a spinal cord bruise secondary to his cervical stenosis and myelopathy.    Other issues that could arise.  Patient is prediabetic and has a history of alcohol use but lesser so since that is lung issue.  He did have a critical illness which could also precipitate with worsening neuropathy.    Will get an EMG to double check.  Patient is requested specifically not to get any more MRIs because it was \"terrible\"!     I do not think that it is necessary at this point because he is not showing any signs of progressive myelopathy or radiculopathy that would require any urgent surgery.  We will get the EMG to help figure out what is going on with his feet      Diagnosis Plan   1. Cervical spondylosis with myelopathy  EMG & Nerve Conduction Test    XR spine cervical 2 or 3 vw      2. Cervical myelopathy with cervical radiculopathy  EMG & Nerve Conduction Test    XR spine cervical 2 or 3 vw        "

## 2024-04-27 DIAGNOSIS — J44.9 CHRONIC OBSTRUCTIVE PULMONARY DISEASE, UNSPECIFIED COPD TYPE: ICD-10-CM

## 2024-04-27 DIAGNOSIS — Z76.0 MEDICATION REFILL: ICD-10-CM

## 2024-04-27 NOTE — TELEPHONE ENCOUNTER
Rx Refill Note  Requested Prescriptions     Pending Prescriptions Disp Refills    albuterol sulfate  (90 Base) MCG/ACT inhaler [Pharmacy Med Name: ALBUTEROL HFA (VENTOLIN) INH]  1     Sig: TAKE 2 PUFFS BY MOUTH EVERY 6 HOURS AS NEEDED FOR WHEEZE    Eliquis 5 MG tablet tablet [Pharmacy Med Name: ELIQUIS 5 MG TABLET] 60 tablet 1     Sig: TAKE 1 TABLET BY MOUTH EVERY 12 HOURS      Last office visit with prescribing clinician: 1/16/2024   Last telemedicine visit with prescribing clinician: Visit date not found   Next office visit with prescribing clinician: 5/17/2024                         Would you like a call back once the refill request has been completed: [] Yes [] No    If the office needs to give you a call back, can they leave a voicemail: [] Yes [] No    Stephy López MA  04/27/24, 10:30 EDT

## 2024-04-29 RX ORDER — APIXABAN 5 MG/1
5 TABLET, FILM COATED ORAL EVERY 12 HOURS
Qty: 60 TABLET | Refills: 1 | Status: SHIPPED | OUTPATIENT
Start: 2024-04-29

## 2024-04-29 RX ORDER — ALBUTEROL SULFATE 90 UG/1
2 AEROSOL, METERED RESPIRATORY (INHALATION) EVERY 6 HOURS PRN
Qty: 18 G | Refills: 1 | Status: SHIPPED | OUTPATIENT
Start: 2024-04-29

## 2024-04-29 NOTE — TELEPHONE ENCOUNTER
Caller: Rey Garrison Jr.    Relationship: Self    Best call back number: [phone]     What is the best time to reach you: ANYTIME    Who are you requesting to speak with (clinical staff, provider,  specific staff member): PCP/MA        What was the call regarding: PATIENT CALLED IN AND STATED THE PHARMACY ADVISED THEY HAVE NOT HEARD BACK FROM THE PCP REGARDING THE REFILL REQUEST. PATIENT STATED HE IS COMPLETELY OUT AND HAS NOT EVEN HAD ONE TO TAKE TODAY AND ITS IMPORTANT HE TAKES IT. REQUEST TO BE SENT TO PHARMACY ASAP TODAY    Is it okay if the provider responds through MyChart: CALLBACK

## 2024-05-07 DIAGNOSIS — R79.89 LOW TESTOSTERONE IN MALE: ICD-10-CM

## 2024-05-07 DIAGNOSIS — E29.1 HYPOGONADISM IN MALE: ICD-10-CM

## 2024-05-07 RX ORDER — TESTOSTERONE CYPIONATE 200 MG/ML
INJECTION, SOLUTION INTRAMUSCULAR
Qty: 3 ML | Refills: 2 | Status: SHIPPED | OUTPATIENT
Start: 2024-05-07

## 2024-07-18 DIAGNOSIS — Z76.0 MEDICATION REFILL: ICD-10-CM

## 2024-07-18 DIAGNOSIS — J44.9 CHRONIC OBSTRUCTIVE PULMONARY DISEASE, UNSPECIFIED COPD TYPE: ICD-10-CM

## 2024-07-18 RX ORDER — FLUTICASONE FUROATE, UMECLIDINIUM BROMIDE AND VILANTEROL TRIFENATATE 200; 62.5; 25 UG/1; UG/1; UG/1
1 POWDER RESPIRATORY (INHALATION) DAILY
Qty: 60 EACH | Refills: 3 | Status: SHIPPED | OUTPATIENT
Start: 2024-07-18

## 2024-07-26 ENCOUNTER — OFFICE VISIT (OUTPATIENT)
Dept: FAMILY MEDICINE CLINIC | Facility: CLINIC | Age: 74
End: 2024-07-26
Payer: MEDICARE

## 2024-07-26 VITALS
SYSTOLIC BLOOD PRESSURE: 118 MMHG | BODY MASS INDEX: 29.03 KG/M2 | RESPIRATION RATE: 16 BRPM | OXYGEN SATURATION: 94 % | HEIGHT: 67 IN | DIASTOLIC BLOOD PRESSURE: 64 MMHG | HEART RATE: 88 BPM | TEMPERATURE: 98.9 F | WEIGHT: 185 LBS

## 2024-07-26 DIAGNOSIS — L73.9 FOLLICULITIS: ICD-10-CM

## 2024-07-26 DIAGNOSIS — R21 RASH: Primary | ICD-10-CM

## 2024-07-26 DIAGNOSIS — J34.89 NOSTRIL SORE: ICD-10-CM

## 2024-07-26 PROCEDURE — 3074F SYST BP LT 130 MM HG: CPT | Performed by: FAMILY MEDICINE

## 2024-07-26 PROCEDURE — 1125F AMNT PAIN NOTED PAIN PRSNT: CPT | Performed by: FAMILY MEDICINE

## 2024-07-26 PROCEDURE — 99214 OFFICE O/P EST MOD 30 MIN: CPT | Performed by: FAMILY MEDICINE

## 2024-07-26 PROCEDURE — 3078F DIAST BP <80 MM HG: CPT | Performed by: FAMILY MEDICINE

## 2024-07-26 RX ORDER — FLUOCINOLONE ACETONIDE 0.25 MG/G
1 CREAM TOPICAL 2 TIMES DAILY
Qty: 60 G | Refills: 1 | Status: SHIPPED | OUTPATIENT
Start: 2024-07-26

## 2024-07-26 NOTE — PROGRESS NOTES
Follow Up Office Visit      Patient Name: Rey Garrison Jr.  : 1950   MRN: [id number]     Chief Complaint:    Chief Complaint   Patient presents with    sore in nose     Pt states he has sores in nose, ears, face and buttocks. Has had them for a few weeks. Come and go. Sore to the touch. Some oozing from them. Pt states he has lung cancer and has just started chemo and radiation. Sores were present before radiation.        History of Present Illness: Rey Garrison Jr. is a 74 y.o. male who is here today to follow up with skin lesions that have been popping up in his nose, on his right ear, and on his buttocks.  Patient is currently being treated with chemo and radiation for lung cancer.  He says that these sores were present before radiation started.  Patient is reporting that they are persistent and overall they do heal on by themselves without treatment-but they are painful and they keep recurring.    Patient also has a rash on his scalp.  Has been to dermatology and they gave him couple different types of treatments and they have not worked.  This scalp rash is a chronic issue.  Has not improved with treatment from dermatology      Physical exam: Erythematous papular lesions noted within bilateral nostrils.  Single papular lesion noted on right ear.  Multiple papular lesions noted on buttocks and they are excoriated.  Mild surrounding erythema and scabs noted on several lesions.  No drainage noted.  Scaly rash noted on scalp          Subjective        I have reviewed and the following portions of the patient's history were updated as appropriate: past family history, past medical history, past social history, past surgical history and problem list.    Medications:     Current Outpatient Medications:     albuterol sulfate  (90 Base) MCG/ACT inhaler, TAKE 2 PUFFS BY MOUTH EVERY 6 HOURS AS NEEDED FOR WHEEZE, Disp: 18 g, Rfl: 1    doxazosin (CARDURA) 4 MG tablet, TAKE 2 TABLETS BY MOUTH  DAILY, Disp: 180 tablet, Rfl: 2    Eliquis 5 MG tablet tablet, TAKE 1 TABLET BY MOUTH EVERY 12 HOURS, Disp: 60 tablet, Rfl: 1    famotidine (PEPCID) 20 MG tablet, 1 tablet., Disp: , Rfl:     finasteride (PROSCAR) 5 MG tablet, TAKE 1 TABLET BY MOUTH EVERY DAY, Disp: 90 tablet, Rfl: 3    fluocinolone acetonide (DERMOTIC) 0.01 % oil otic oil, Administer  into both ears 2 (Two) Times a Day., Disp: 20 mL, Rfl: 5    Fluticasone-Umeclidin-Vilant (Trelegy Ellipta) 200-62.5-25 MCG/ACT inhaler, Inhale 1 puff Daily., Disp: 60 each, Rfl: 3    ketoconazole (NIZORAL) 2 % cream, Apply to appropriate area twice daily, Disp: 45 g, Rfl: 5    Multiple Vitamin (MULTI-VITAMIN DAILY PO), Take 1 tablet by mouth Daily., Disp: , Rfl:     mupirocin (BACTROBAN) 2 % ointment, Apply 1 Application topically to the appropriate area as directed 3 (Three) Times a Day. Do not put in mouth or nose, Disp: 60 g, Rfl: 1    sildenafil (VIAGRA) 100 MG tablet, Take 1 tablet by mouth Daily As Needed for Erectile Dysfunction., Disp: 15 tablet, Rfl: 5    sodium-potassium-magnesium sulfates (SUPREP) 17.5-3.13-1.6 GM/177ML solution oral solution, , Disp: , Rfl:     Testosterone Cypionate (DEPOTESTOTERONE CYPIONATE) 200 MG/ML injection, INJECT 1.5 ML EVERY 14 DAYS, Disp: 3 mL, Rfl: 2    tiZANidine (ZANAFLEX) 4 MG tablet, TAKE 1 TABLET BY MOUTH THREE TIMES A DAY AS NEEDED FOR MUSCLE SPASM, Disp: 270 tablet, Rfl: 0    triamcinolone (KENALOG) 0.5 % cream, Apply 1 application topically to the appropriate area as directed 2 (Two) Times a Day., Disp: 30 g, Rfl: 3    fluocinolone (SYNALAR) 0.025 % cream, Apply 1 Application topically to the appropriate area as directed 2 (Two) Times a Day. For scalp, Disp: 60 g, Rfl: 1    mupirocin (BACTROBAN) 2 % nasal ointment, 1 Application into the nostril(s) as directed by provider 2 (Two) Times a Day., Disp: 30 g, Rfl: 1    Allergies:   Allergies   Allergen Reactions    Alprazolam Hallucinations    Penicillin G Unknown - Low  "Severity    Sulfamethoxazole-Trimethoprim Hallucinations    Penicillamine Unknown (See Comments)     As a child     Penicillins Itching     Causes both the hands and the feet to itch     Sulfa Antibiotics Itching     Causes both the hands and the feet to itch    Causes both the hands and the feet to itch    Other reaction(s): Itching, Rash       Objective     Physical Exam: Please see above  Vital Signs:   Vitals:    07/26/24 1311   BP: 118/64   BP Location: Left arm   Patient Position: Sitting   Cuff Size: Adult   Pulse: 88   Resp: 16   Temp: 98.9 °F (37.2 °C)   TempSrc: Temporal   SpO2: 94%   Weight: 83.9 kg (185 lb)   Height: 170.2 cm (67\")     Body mass index is 28.98 kg/m².          Assessment / Plan      Assessment/Plan:   Diagnoses and all orders for this visit:    1. Rash (Primary)  -     fluocinolone (SYNALAR) 0.025 % cream; Apply 1 Application topically to the appropriate area as directed 2 (Two) Times a Day. For scalp  Dispense: 60 g; Refill: 1    2. Folliculitis  -     Discontinue: mupirocin (BACTROBAN) 2 % ointment; Apply 1 Application topically to the appropriate area as directed 3 (Three) Times a Day. Do not put in mouth  Dispense: 60 g; Refill: 1  -     mupirocin (BACTROBAN) 2 % ointment; Apply 1 Application topically to the appropriate area as directed 3 (Three) Times a Day. Do not put in mouth or nose  Dispense: 60 g; Refill: 1    3. Nostril sore  -     mupirocin (BACTROBAN) 2 % nasal ointment; 1 Application into the nostril(s) as directed by provider 2 (Two) Times a Day.  Dispense: 30 g; Refill: 1    Chronic scalp rash-patient's scalp lesion looks like it is dermatitis.  I recommend using fluocinolone as prescribed.  I also recommend that he follow-up with dermatology for further recommendations.    The skin lesions may not correspond with patient's radiation and chemotherapy-but the patient overall was not sure.  I think that these could be related to his current lung cancer treatment.  Advised " patient to reach out to his oncologist at next visit to discuss these lesions to see if they are related to treatment.  I recommended using steroids and Bactroban to help treat these lesions.  They look like folliculitis and they may respond to these therapies.  If they do not-then I recommend follow-up with dermatology for further assessment and he may need a biopsy.    Follow Up:   Return if symptoms worsen or fail to improve.    Slava Cary DO  [facility] [place]

## 2024-07-27 DIAGNOSIS — Z76.0 MEDICATION REFILL: ICD-10-CM

## 2024-07-27 DIAGNOSIS — J44.9 CHRONIC OBSTRUCTIVE PULMONARY DISEASE, UNSPECIFIED COPD TYPE: ICD-10-CM

## 2024-07-29 RX ORDER — ALBUTEROL SULFATE 90 UG/1
AEROSOL, METERED RESPIRATORY (INHALATION)
Qty: 18 G | Refills: 1 | Status: SHIPPED | OUTPATIENT
Start: 2024-07-29 | End: 2024-08-01 | Stop reason: SDUPTHER

## 2024-08-01 DIAGNOSIS — E29.1 HYPOGONADISM IN MALE: ICD-10-CM

## 2024-08-01 DIAGNOSIS — J44.9 CHRONIC OBSTRUCTIVE PULMONARY DISEASE, UNSPECIFIED COPD TYPE: ICD-10-CM

## 2024-08-01 DIAGNOSIS — R79.89 LOW TESTOSTERONE IN MALE: ICD-10-CM

## 2024-08-01 DIAGNOSIS — Z76.0 MEDICATION REFILL: ICD-10-CM

## 2024-08-01 RX ORDER — TESTOSTERONE CYPIONATE 200 MG/ML
INJECTION, SOLUTION INTRAMUSCULAR
Qty: 3 ML | Refills: 2 | Status: SHIPPED | OUTPATIENT
Start: 2024-08-01

## 2024-08-01 RX ORDER — ALBUTEROL SULFATE 90 UG/1
2 AEROSOL, METERED RESPIRATORY (INHALATION) EVERY 4 HOURS PRN
Qty: 18 G | Refills: 1 | Status: SHIPPED | OUTPATIENT
Start: 2024-08-01

## 2024-08-01 NOTE — TELEPHONE ENCOUNTER
Caller: Rey Garrison Jr.    Relationship: Self    Best call back number: [phone]     Requested Prescriptions:   Requested Prescriptions     Pending Prescriptions Disp Refills    albuterol sulfate  (90 Base) MCG/ACT inhaler 18 g 1    Testosterone Cypionate (DEPOTESTOTERONE CYPIONATE) 200 MG/ML injection 3 mL 2     Sig: INJECT 1.5 ML EVERY 14 DAYS        Pharmacy where request should be sent: [facility]/PHARMACY #[id number] - [place], [place] - [address] [phone] [facility] [phone]      Last office visit with prescribing clinician: 1/16/2024   Last telemedicine visit with prescribing clinician: Visit date not found   Next office visit with prescribing clinician: Visit date not found     Additional details provided by patient: PATIENT IS OUT OF THE MEDICATION    Does the patient have less than a 3 day supply:  [x] Yes  [] No    Would you like a call back once the refill request has been completed: [x] Yes [] No    If the office needs to give you a call back, can they leave a voicemail: [x] Yes [] No    Remberto Quiles Rep   08/01/24 08:15 EDT

## 2024-08-03 NOTE — TELEPHONE ENCOUNTER
Rx Refill Note  Requested Prescriptions     Pending Prescriptions Disp Refills    Eliquis 5 MG tablet tablet [Pharmacy Med Name: ELIQUIS 5 MG TABLET] 60 tablet 1     Sig: TAKE 1 TABLET BY MOUTH EVERY 12 HOURS      Last office visit with prescribing clinician: Visit date not found   Last telemedicine visit with prescribing clinician: Visit date not found   Next office visit with prescribing clinician: Visit date not found                         Would you like a call back once the refill request has been completed: [] Yes [] No    If the office needs to give you a call back, can they leave a voicemail: [] Yes [] No    Danielle Knapp MA  08/03/24, 10:52 EDT

## 2024-08-05 RX ORDER — APIXABAN 5 MG/1
5 TABLET, FILM COATED ORAL EVERY 12 HOURS
Qty: 60 TABLET | Refills: 1 | Status: SHIPPED | OUTPATIENT
Start: 2024-08-05

## 2024-09-19 RX ORDER — FINASTERIDE 5 MG/1
5 TABLET, FILM COATED ORAL DAILY
Qty: 90 TABLET | Refills: 0 | Status: SHIPPED | OUTPATIENT
Start: 2024-09-19

## 2024-09-20 NOTE — TELEPHONE ENCOUNTER
Pt notified   [Consultation] : a consultation visit [FreeTextEntry1] :  who was referred for further evaluation of joint symptoms and rheumatic diseases.

## 2024-10-13 DIAGNOSIS — J44.9 CHRONIC OBSTRUCTIVE PULMONARY DISEASE, UNSPECIFIED COPD TYPE: ICD-10-CM

## 2024-10-13 DIAGNOSIS — Z76.0 MEDICATION REFILL: ICD-10-CM

## 2024-10-14 DIAGNOSIS — J44.9 CHRONIC OBSTRUCTIVE PULMONARY DISEASE, UNSPECIFIED COPD TYPE: ICD-10-CM

## 2024-10-14 DIAGNOSIS — Z76.0 MEDICATION REFILL: ICD-10-CM

## 2024-10-14 RX ORDER — ALBUTEROL SULFATE 90 UG/1
2 INHALANT RESPIRATORY (INHALATION) EVERY 4 HOURS PRN
Qty: 18 G | Refills: 1 | Status: SHIPPED | OUTPATIENT
Start: 2024-10-14

## 2024-10-14 RX ORDER — FLUTICASONE FUROATE, UMECLIDINIUM BROMIDE AND VILANTEROL TRIFENATATE 200; 62.5; 25 UG/1; UG/1; UG/1
POWDER RESPIRATORY (INHALATION)
Qty: 180 EACH | Refills: 0 | Status: SHIPPED | OUTPATIENT
Start: 2024-10-14

## 2024-11-05 ENCOUNTER — TELEPHONE (OUTPATIENT)
Dept: FAMILY MEDICINE CLINIC | Facility: CLINIC | Age: 74
End: 2024-11-05
Payer: COMMERCIAL

## 2024-11-05 DIAGNOSIS — J44.9 CHRONIC OBSTRUCTIVE PULMONARY DISEASE, UNSPECIFIED COPD TYPE: ICD-10-CM

## 2024-11-05 DIAGNOSIS — Z76.0 MEDICATION REFILL: ICD-10-CM

## 2024-11-05 NOTE — TELEPHONE ENCOUNTER
Caller: Rey Garrison Jr.    Relationship: Self    Best call back number: [phone]    Which medication are you concerned about: TRELEGY    Who prescribed you this medication: DR BARRIOS    What are your concerns: MOE NEVER RECEIVED REFILL THAT WAS SENT ON 10/14/2024. PLEASE SEND REFILL AGAIN. PATIENT IS ALMOST OUT OF MEDICATION.

## 2024-11-06 DIAGNOSIS — E29.1 HYPOGONADISM IN MALE: ICD-10-CM

## 2024-11-06 DIAGNOSIS — R79.89 LOW TESTOSTERONE IN MALE: ICD-10-CM

## 2024-11-06 RX ORDER — TESTOSTERONE CYPIONATE 200 MG/ML
INJECTION, SOLUTION INTRAMUSCULAR
Qty: 3 ML | Refills: 2 | Status: SHIPPED | OUTPATIENT
Start: 2024-11-06

## 2024-11-06 RX ORDER — FLUTICASONE FUROATE, UMECLIDINIUM BROMIDE AND VILANTEROL TRIFENATATE 200; 62.5; 25 UG/1; UG/1; UG/1
1 POWDER RESPIRATORY (INHALATION)
Qty: 180 EACH | Refills: 0 | Status: SHIPPED | OUTPATIENT
Start: 2024-11-06

## 2024-11-06 NOTE — TELEPHONE ENCOUNTER
Caller: Rey Garrison Jr.    Relationship: Self    Best call back number: [phone]    Requested Prescriptions:   Requested Prescriptions     Pending Prescriptions Disp Refills    Testosterone Cypionate (DEPOTESTOTERONE CYPIONATE) 200 MG/ML injection 3 mL 2     Sig: INJECT 1.5 ML EVERY 14 DAYS        Pharmacy where request should be sent: [facility]/PHARMACY #[id number] - [address] [phone] [facility] [phone]      Last office visit with prescribing clinician: 1/16/2024   Last telemedicine visit with prescribing clinician: Visit date not found   Next office visit with prescribing clinician: Visit date not found     Additional details provided by patient:     Does the patient have less than a 3 day supply:  [x] Yes  [] No    Would you like a call back once the refill request has been completed: [x] Yes [] No    If the office needs to give you a call back, can they leave a voicemail: [x] Yes [] No    Remberto Sosa Rep   11/06/24 11:14 EST

## 2024-11-06 NOTE — TELEPHONE ENCOUNTER
Rx Refill Note  Requested Prescriptions     Pending Prescriptions Disp Refills    Testosterone Cypionate (DEPOTESTOTERONE CYPIONATE) 200 MG/ML injection 3 mL 2     Sig: INJECT 1.5 ML EVERY 14 DAYS      Last office visit with prescribing clinician: 1/16/2024   Last telemedicine visit with prescribing clinician: Visit date not found   Next office visit with prescribing clinician: Visit date not found                         Would you like a call back once the refill request has been completed: [] Yes [] No    If the office needs to give you a call back, can they leave a voicemail: [] Yes [] No    Danielle Knapp MA  11/06/24, 11:25 EST

## 2024-12-26 DIAGNOSIS — E29.1 HYPOGONADISM IN MALE: ICD-10-CM

## 2024-12-26 DIAGNOSIS — R79.89 LOW TESTOSTERONE IN MALE: ICD-10-CM

## 2024-12-26 RX ORDER — TESTOSTERONE CYPIONATE 200 MG/ML
INJECTION, SOLUTION INTRAMUSCULAR
Qty: 3 ML | Refills: 2 | OUTPATIENT
Start: 2024-12-26

## 2024-12-26 NOTE — TELEPHONE ENCOUNTER
Patient has not been seen for his testosterone level follow-up since January.  Patient needs to be seen every 3 months.  Patient is to schedule appointment.  Once appointment has been scheduled I will give him refills to make it to that appointment time.  If patient misses appointment time no further refills will be given.

## 2024-12-30 ENCOUNTER — PRIOR AUTHORIZATION (OUTPATIENT)
Dept: FAMILY MEDICINE CLINIC | Facility: CLINIC | Age: 74
End: 2024-12-30
Payer: COMMERCIAL

## 2024-12-30 DIAGNOSIS — R79.89 LOW TESTOSTERONE IN MALE: ICD-10-CM

## 2024-12-30 DIAGNOSIS — E29.1 HYPOGONADISM IN MALE: ICD-10-CM

## 2024-12-30 RX ORDER — TESTOSTERONE CYPIONATE 200 MG/ML
INJECTION, SOLUTION INTRAMUSCULAR
Qty: 3 ML | Refills: 0 | Status: SHIPPED | OUTPATIENT
Start: 2024-12-30

## 2024-12-30 NOTE — TELEPHONE ENCOUNTER
Caller: Rey Garrison Jr.    Relationship: Self    Best call back number: [phone]     Requested Prescriptions:   Requested Prescriptions     Pending Prescriptions Disp Refills    Testosterone Cypionate (DEPOTESTOTERONE CYPIONATE) 200 MG/ML injection 3 mL 2     Sig: INJECT 1.5 ML EVERY 14 DAYS. Need appointment for refills        Pharmacy where request should be sent: [facility]/PHARMACY #[id number] - [place], [place] - [address] [facility] [facility] [phone] [place] [phone]      Last office visit with prescribing clinician: 1/16/2024   Last telemedicine visit with prescribing clinician: Visit date not found   Next office visit with prescribing clinician: Visit date not found     Additional details provided by patient: PATIENT IS OUT OF MEDICATION / REQUESTING REFILLS AND WILL PAY CASH - NO PRIOR AUTH NEEDED     Does the patient have less than a 3 day supply:  [x] Yes      Would you like a call back once the refill request has been completed: [] Yes [x] No    If the office needs to give you a call back, can they leave a voicemail: [] Yes [x] No    Bebe Ordoñez MA   12/30/24 08:36 EST

## 2024-12-30 NOTE — TELEPHONE ENCOUNTER
[id number]      Testosterone Cypionate 200MG/ML intramuscular solution      PA started      No coverage was found  No eligibility was found. Please reconfirm the member's health plan coverage before re-submitting.

## 2024-12-31 ENCOUNTER — TELEPHONE (OUTPATIENT)
Dept: FAMILY MEDICINE CLINIC | Facility: CLINIC | Age: 74
End: 2024-12-31
Payer: COMMERCIAL

## 2024-12-31 DIAGNOSIS — E29.1 HYPOGONADISM IN MALE: ICD-10-CM

## 2024-12-31 DIAGNOSIS — R79.89 LOW TESTOSTERONE IN MALE: ICD-10-CM

## 2024-12-31 NOTE — TELEPHONE ENCOUNTER
Caller: Rey Garrison Jr.    Relationship: Self    Best call back number: [phone]     What is the best time to reach you: ANY    Who are you requesting to speak with (clinical staff, provider,  specific staff member): CLINICAL    What was the call regarding: PATIENT RECEIVED REFILL FOR Testosterone Cypionate (DEPOTESTOTERONE CYPIONATE) 200 MG/ML injection , HOWEVER PATIENT WAS ONLY SENT 3 DOSES INSTEAD OF THE 4 HE WAS MEANT TO. PATIENT IS REQUESTING THE FOURTH DOSE BE SENT TO [facility]/pharmacy #[id number] - [place], [place] - [address] [place] [address] [phone] [place] [phone] . PATIENT HAS NOT PICKED ANY DOSES UP YET BECAUSE HE DOES NOT HAVE THE PROPER AMOUNT PRESCRIBED.    PLEASE CALL ONCE PRESCRIPTION IS SENT TO CONFIRM CORRECT DOSAGE

## 2025-01-02 DIAGNOSIS — I10 ESSENTIAL HYPERTENSION: ICD-10-CM

## 2025-01-02 RX ORDER — DOXAZOSIN 4 MG/1
8 TABLET ORAL DAILY
Qty: 180 TABLET | Refills: 2 | Status: SHIPPED | OUTPATIENT
Start: 2025-01-02

## 2025-01-02 NOTE — TELEPHONE ENCOUNTER
Rx Refill Note  Requested Prescriptions     Pending Prescriptions Disp Refills    doxazosin (CARDURA) 4 MG tablet [Pharmacy Med Name: DOXAZOSIN MESYLATE 4 MG TAB] 180 tablet 2     Sig: TAKE 2 TABLETS BY MOUTH EVERY DAY      Last office visit with prescribing clinician: 1/16/2024   Last telemedicine visit with prescribing clinician: Visit date not found   Next office visit with prescribing clinician: 1/8/2025                         Would you like a call back once the refill request has been completed: [] Yes [] No    If the office needs to give you a call back, can they leave a voicemail: [] Yes [] No    Danielle Knapp MA  01/02/25, 11:24 EST

## 2025-01-03 ENCOUNTER — PRIOR AUTHORIZATION (OUTPATIENT)
Dept: FAMILY MEDICINE CLINIC | Facility: CLINIC | Age: 75
End: 2025-01-03
Payer: COMMERCIAL

## 2025-01-06 RX ORDER — FINASTERIDE 5 MG/1
5 TABLET, FILM COATED ORAL DAILY
Qty: 90 TABLET | Refills: 0 | Status: SHIPPED | OUTPATIENT
Start: 2025-01-06

## 2025-01-06 RX ORDER — DILTIAZEM HYDROCHLORIDE EXTENDED-RELEASE TABLETS 180 MG/1
1 TABLET, EXTENDED RELEASE ORAL DAILY
COMMUNITY
Start: 2024-10-19

## 2025-01-08 ENCOUNTER — OFFICE VISIT (OUTPATIENT)
Dept: FAMILY MEDICINE CLINIC | Facility: CLINIC | Age: 75
End: 2025-01-08
Payer: MEDICARE

## 2025-01-08 VITALS
DIASTOLIC BLOOD PRESSURE: 68 MMHG | OXYGEN SATURATION: 94 % | BODY MASS INDEX: 27.65 KG/M2 | SYSTOLIC BLOOD PRESSURE: 122 MMHG | TEMPERATURE: 97.5 F | WEIGHT: 176.2 LBS | HEART RATE: 83 BPM | HEIGHT: 67 IN

## 2025-01-08 DIAGNOSIS — Z51.81 THERAPEUTIC DRUG MONITORING: ICD-10-CM

## 2025-01-08 DIAGNOSIS — E29.1 HYPOGONADISM IN MALE: ICD-10-CM

## 2025-01-08 DIAGNOSIS — I10 ESSENTIAL HYPERTENSION: Primary | ICD-10-CM

## 2025-01-08 DIAGNOSIS — Z12.5 PROSTATE CANCER SCREENING: ICD-10-CM

## 2025-01-08 DIAGNOSIS — R79.89 LOW TESTOSTERONE IN MALE: ICD-10-CM

## 2025-01-08 PROCEDURE — G2211 COMPLEX E/M VISIT ADD ON: HCPCS | Performed by: FAMILY MEDICINE

## 2025-01-08 PROCEDURE — 3078F DIAST BP <80 MM HG: CPT | Performed by: FAMILY MEDICINE

## 2025-01-08 PROCEDURE — 1159F MED LIST DOCD IN RCRD: CPT | Performed by: FAMILY MEDICINE

## 2025-01-08 PROCEDURE — 1125F AMNT PAIN NOTED PAIN PRSNT: CPT | Performed by: FAMILY MEDICINE

## 2025-01-08 PROCEDURE — 1160F RVW MEDS BY RX/DR IN RCRD: CPT | Performed by: FAMILY MEDICINE

## 2025-01-08 PROCEDURE — 99214 OFFICE O/P EST MOD 30 MIN: CPT | Performed by: FAMILY MEDICINE

## 2025-01-08 PROCEDURE — 3074F SYST BP LT 130 MM HG: CPT | Performed by: FAMILY MEDICINE

## 2025-01-08 RX ORDER — TESTOSTERONE CYPIONATE 200 MG/ML
INJECTION, SOLUTION INTRAMUSCULAR
Qty: 4 ML | Refills: 0 | Status: SHIPPED | OUTPATIENT
Start: 2025-01-08

## 2025-01-08 RX ORDER — LIDOCAINE HYDROCHLORIDE 20 MG/ML
SOLUTION OROPHARYNGEAL
COMMUNITY
Start: 2024-07-29

## 2025-01-08 RX ORDER — ONDANSETRON 8 MG/1
8 TABLET, FILM COATED ORAL
COMMUNITY
Start: 2024-08-05

## 2025-01-08 RX ORDER — DOXYCYCLINE 100 MG/1
100 CAPSULE ORAL
COMMUNITY
Start: 2024-08-07 | End: 2025-01-08

## 2025-01-08 RX ORDER — DIPHENHYDRAMINE HCL 12.5 MG/5ML
SOLUTION ORAL
COMMUNITY
Start: 2024-11-05 | End: 2025-01-08

## 2025-01-08 RX ORDER — SUCRALFATE ORAL 1 G/10ML
1 SUSPENSION ORAL
COMMUNITY
Start: 2024-09-03 | End: 2025-01-08

## 2025-01-08 NOTE — ASSESSMENT & PLAN NOTE
Chronic and stable.  Patient has tolerated current dose of testosterone well.  It is too late in the day to check testosterone level today but will check at follow-up visit.  Elmo was reviewed and appropriate.  Controlled substance agreement signed today.  Patient will follow-up in 3 months.

## 2025-01-08 NOTE — PROGRESS NOTES
Rey Garrison Jr. is a 74 y.o. male who presents today for Low Testosterone  and Hypertension      Patient has been taking 300mg of testosterone every 14 days. He has tolerated this well. He needs a refill because the pharmacy did not give him a full month worth last time he picked it up. He has been taking doxazosin, and cardizem for blood pressure. He has been checking blood pressure at home occasionally and gets numbers that are controlled at home. He has no acute complaints today.            Review of Systems   Constitutional:  Negative for fever and unexpected weight loss.   HENT:  Negative for congestion, ear pain and sore throat.    Eyes:  Negative for visual disturbance.   Respiratory:  Negative for cough, shortness of breath and wheezing.    Cardiovascular:  Negative for chest pain and palpitations.   Gastrointestinal:  Negative for abdominal pain, blood in stool, constipation, diarrhea, nausea, vomiting and GERD.   Endocrine: Negative for polydipsia and polyuria.   Genitourinary:  Negative for difficulty urinating.   Musculoskeletal:  Positive for neck pain (chronic). Negative for joint swelling.   Skin:  Negative for rash and skin lesions.   Allergic/Immunologic: Negative for environmental allergies.   Neurological:  Negative for seizures and syncope.   Hematological:  Does not bruise/bleed easily.   Psychiatric/Behavioral:  Negative for suicidal ideas.         The following portions of the patient's history were reviewed and updated as appropriate: allergies, current medications, past family history, past medical history, past social history, past surgical history and problem list.    Current Outpatient Medications on File Prior to Visit   Medication Sig Dispense Refill    albuterol sulfate  (90 Base) MCG/ACT inhaler INHALE 2 PUFFS EVERY 4 HOURS AS NEEDED FOR WHEEZING OR SHORTNESS OF AIR 18 g 1    dilTIAZem HCl ER (CARDIZEM LA) 180 MG 24 hr tablet Take 1 tablet by mouth Daily.      doxazosin  (CARDURA) 4 MG tablet TAKE 2 TABLETS BY MOUTH EVERY  tablet 2    Eliquis 5 MG tablet tablet TAKE 1 TABLET BY MOUTH EVERY 12 HOURS 60 tablet 1    famotidine (PEPCID) 20 MG tablet 1 tablet.      finasteride (PROSCAR) 5 MG tablet TAKE 1 TABLET BY MOUTH EVERY DAY 90 tablet 0    fluocinolone (SYNALAR) 0.025 % cream Apply 1 Application topically to the appropriate area as directed 2 (Two) Times a Day. For scalp 60 g 1    fluocinolone acetonide (DERMOTIC) 0.01 % oil otic oil Administer  into both ears 2 (Two) Times a Day. 20 mL 5    Fluticasone-Umeclidin-Vilant (Trelegy Ellipta) 200-62.5-25 MCG/ACT inhaler Inhale 1 puff Daily. 180 each 0    ketoconazole (NIZORAL) 2 % cream Apply to appropriate area twice daily 45 g 5    Lidocaine Viscous HCl (XYLOCAINE) 2 % solution MIX 1:1:1 WITH BENADRYL AND MAALOX AND TAKE 1-2 TSP BY MOUTH FOR SORE THROAT UP TO 6 TIMES PER DAY..      Multiple Vitamin (MULTI-VITAMIN DAILY PO) Take 1 tablet by mouth Daily.      mupirocin (BACTROBAN) 2 % ointment Apply 1 Application topically to the appropriate area as directed 3 (Three) Times a Day. Do not put in mouth or nose (Patient taking differently: Apply 1 Application topically to the appropriate area as directed 3 (Three) Times a Day As Needed (lesion). Do not put in mouth or nose) 60 g 1    ondansetron (ZOFRAN) 8 MG tablet Take 1 tablet by mouth.      sildenafil (VIAGRA) 100 MG tablet Take 1 tablet by mouth Daily As Needed for Erectile Dysfunction. 15 tablet 5    triamcinolone (KENALOG) 0.5 % cream Apply 1 application topically to the appropriate area as directed 2 (Two) Times a Day. 30 g 3    [DISCONTINUED] Testosterone Cypionate (DEPOTESTOTERONE CYPIONATE) 200 MG/ML injection INJECT 1.5 ML EVERY 14 DAYS. Need appointment for refills 3 mL 0    [DISCONTINUED] diphenhydrAMINE (BENADRYL) 12.5 MG/5ML liquid MIX 1:1:1 WITH LIDOCAINE AND MAALOX AND TAKE 1-2 TSP BY MOUTH FOR SORE THROAT UP TO EVERY 4 HOURS AS NEEDED. (Patient not taking:  "Reported on 1/8/2025)      [DISCONTINUED] doxycycline (VIBRAMYCIN) 100 MG capsule Take 1 capsule by mouth. (Patient not taking: Reported on 1/8/2025)      [DISCONTINUED] mupirocin (BACTROBAN) 2 % nasal ointment 1 Application into the nostril(s) as directed by provider 2 (Two) Times a Day. (Patient not taking: Reported on 1/8/2025) 30 g 1    [DISCONTINUED] sodium-potassium-magnesium sulfates (SUPREP) 17.5-3.13-1.6 GM/177ML solution oral solution  (Patient not taking: Reported on 1/8/2025)      [DISCONTINUED] sucralfate (CARAFATE) 1 GM/10ML suspension Take 10 mL by mouth.      [DISCONTINUED] tiZANidine (ZANAFLEX) 4 MG tablet TAKE 1 TABLET BY MOUTH THREE TIMES A DAY AS NEEDED FOR MUSCLE SPASM (Patient not taking: Reported on 1/8/2025) 270 tablet 0     No current facility-administered medications on file prior to visit.       Allergies   Allergen Reactions    Alprazolam Hallucinations    Penicillin G Unknown - Low Severity    Sulfamethoxazole-Trimethoprim Hallucinations    Penicillamine Unknown (See Comments)     As a child     Penicillins Itching     Causes both the hands and the feet to itch     Sulfa Antibiotics Itching     Causes both the hands and the feet to itch    Causes both the hands and the feet to itch    Other reaction(s): Itching, Rash        Visit Vitals  /68   Pulse 83   Temp 97.5 °F (36.4 °C) (Infrared)   Ht 170.2 cm (67\")   Wt 79.9 kg (176 lb 3.2 oz)   SpO2 94%   BMI 27.60 kg/m²        Physical Exam  Constitutional:       General: He is not in acute distress.     Appearance: He is well-developed. He is not diaphoretic.   HENT:      Head: Atraumatic.   Cardiovascular:      Rate and Rhythm: Normal rate and regular rhythm.      Heart sounds: Normal heart sounds. No murmur heard.     No friction rub. No gallop.   Pulmonary:      Effort: Pulmonary effort is normal. No respiratory distress.      Breath sounds: Normal breath sounds. No stridor. No wheezing, rhonchi or rales.   Skin:     General: Skin is " warm and dry.   Neurological:      Mental Status: He is alert and oriented to person, place, and time.   Psychiatric:         Behavior: Behavior normal.          Results for orders placed or performed in visit on 01/16/24   Compliance Drug Analysis, Ur - Urine, Clean Catch    Collection Time: 01/16/24  5:03 PM    Specimen: Urine, Clean Catch    Urine  Release to judy   Result Value Ref Range    Report Summary FINAL         Problems Addressed this Visit          Cardiac and Vasculature    Essential hypertension - Primary     Hypertension is stable and controlled  Continue current treatment regimen.  Blood pressure will be reassessed in 3 months.            Endocrine and Metabolic    Hypogonadism in male    Relevant Medications    Testosterone Cypionate (DEPOTESTOTERONE CYPIONATE) 200 MG/ML injection    Low testosterone in male     Chronic and stable.  Patient has tolerated current dose of testosterone well.  It is too late in the day to check testosterone level today but will check at follow-up visit.  Elom was reviewed and appropriate.  Controlled substance agreement signed today.  Patient will follow-up in 3 months.         Relevant Medications    Testosterone Cypionate (DEPOTESTOTERONE CYPIONATE) 200 MG/ML injection       Health Encounters    Therapeutic drug monitoring    Relevant Orders    Compliance Drug Analysis, Ur - Urine, Clean Catch     Other Visit Diagnoses       Prostate cancer screening        Relevant Orders    PSA Screen          Diagnoses         Codes Comments    Essential hypertension    -  Primary ICD-10-CM: I10  ICD-9-CM: 401.9     Low testosterone in male     ICD-10-CM: R79.89  ICD-9-CM: 790.99     Therapeutic drug monitoring     ICD-10-CM: Z51.81  ICD-9-CM: V58.83     Prostate cancer screening     ICD-10-CM: Z12.5  ICD-9-CM: V76.44     Hypogonadism in male     ICD-10-CM: E29.1  ICD-9-CM: 257.2             Return in about 3 months (around 4/8/2025) for Medicare Wellness.    Rory Ayers,  MD   1/8/2025

## 2025-01-10 RX ORDER — SILDENAFIL 100 MG/1
100 TABLET, FILM COATED ORAL DAILY PRN
Qty: 15 TABLET | Refills: 5 | Status: SHIPPED | OUTPATIENT
Start: 2025-01-10

## 2025-01-10 NOTE — TELEPHONE ENCOUNTER
Caller: Rey Garrison Jr.    Relationship: Self    Best call back number:   Telephone Information:   Mobile [phone]        Requested Prescriptions:   Requested Prescriptions     Pending Prescriptions Disp Refills    sildenafil (VIAGRA) 100 MG tablet 15 tablet 5     Sig: Take 1 tablet by mouth Daily As Needed for Erectile Dysfunction.        Pharmacy where request should be sent: [facility] #[id number] - [place], [place] - [address] [place] [address] [phone] [facility] [phone]      Last office visit with prescribing clinician: 1/8/2025   Last telemedicine visit with prescribing clinician: Visit date not found   Next office visit with prescribing clinician: Visit date not found     Additional details provided by patient:     Does the patient have less than a 3 day supply:  [x] Yes  [] No    Would you like a call back once the refill request has been completed: [] Yes [x] No    If the office needs to give you a call back, can they leave a voicemail: [] Yes [x] No    Remberto Ascencio Rep   01/10/25 10:10 EST

## 2025-01-12 LAB — DRUGS UR: NORMAL

## 2025-01-25 DIAGNOSIS — J44.9 CHRONIC OBSTRUCTIVE PULMONARY DISEASE, UNSPECIFIED COPD TYPE: ICD-10-CM

## 2025-01-25 DIAGNOSIS — Z76.0 MEDICATION REFILL: ICD-10-CM

## 2025-01-27 RX ORDER — FINASTERIDE 5 MG/1
5 TABLET, FILM COATED ORAL DAILY
Qty: 90 TABLET | Refills: 3 | Status: SHIPPED | OUTPATIENT
Start: 2025-01-27

## 2025-01-27 RX ORDER — ALBUTEROL SULFATE 90 UG/1
2 INHALANT RESPIRATORY (INHALATION) EVERY 4 HOURS PRN
Qty: 16 G | Refills: 1 | Status: SHIPPED | OUTPATIENT
Start: 2025-01-27

## 2025-02-12 DIAGNOSIS — R79.89 LOW TESTOSTERONE IN MALE: ICD-10-CM

## 2025-02-12 DIAGNOSIS — E29.1 HYPOGONADISM IN MALE: ICD-10-CM

## 2025-02-12 RX ORDER — TESTOSTERONE CYPIONATE 200 MG/ML
INJECTION, SOLUTION INTRAMUSCULAR
Qty: 4 ML | Refills: 0 | Status: SHIPPED | OUTPATIENT
Start: 2025-02-12

## 2025-02-12 NOTE — TELEPHONE ENCOUNTER
Caller: Rey Garrison Jr.    Relationship: Self    Best call back number: [phone]     Requested Prescriptions:   Requested Prescriptions     Pending Prescriptions Disp Refills    Testosterone Cypionate (DEPOTESTOTERONE CYPIONATE) 200 MG/ML injection 4 mL 0     Sig: INJECT 1.5 ML EVERY 14 DAYS. Need appointment for refills        Pharmacy where request should be sent: [facility]/PH[facility]ACY #[id number] - [place], [place] - [address] [phone] [place] [phone]      Last office visit with prescribing clinician: 1/8/2025   Last telemedicine visit with prescribing clinician: Visit date not found   Next office visit with prescribing clinician: Visit date not found     Additional details provided by patient: OUT OF MEDICATION    Does the patient have less than a 3 day supply:  [x] Yes  [] No    Would you like a call back once the refill request has been completed: [] Yes [x] No    If the office needs to give you a call back, can they leave a voicemail: [] Yes [x] No    Remberto Tovar Rep   02/12/25 11:28 EST

## 2025-03-06 ENCOUNTER — HOSPITAL ENCOUNTER (OUTPATIENT)
Dept: NEUROLOGY | Facility: HOSPITAL | Age: 75
Discharge: HOME | End: 2025-03-06
Payer: MEDICARE

## 2025-03-06 ENCOUNTER — HOSPITAL ENCOUNTER (OUTPATIENT)
Dept: GENERAL RADIOLOGY | Facility: HOSPITAL | Age: 75
Discharge: HOME | End: 2025-03-06
Payer: MEDICARE

## 2025-03-06 DIAGNOSIS — G95.9 CERVICAL MYELOPATHY WITH CERVICAL RADICULOPATHY: ICD-10-CM

## 2025-03-06 DIAGNOSIS — M47.12 CERVICAL SPONDYLOSIS WITH MYELOPATHY: ICD-10-CM

## 2025-03-06 DIAGNOSIS — M54.12 CERVICAL MYELOPATHY WITH CERVICAL RADICULOPATHY: ICD-10-CM

## 2025-03-06 PROCEDURE — 72040 X-RAY EXAM NECK SPINE 2-3 VW: CPT

## 2025-03-06 PROCEDURE — 95886 MUSC TEST DONE W/N TEST COMP: CPT

## 2025-03-06 PROCEDURE — 95913 NRV CNDJ TEST 13/> STUDIES: CPT

## 2025-03-10 DIAGNOSIS — R79.89 LOW TESTOSTERONE IN MALE: ICD-10-CM

## 2025-03-10 DIAGNOSIS — E29.1 HYPOGONADISM IN MALE: ICD-10-CM

## 2025-03-10 RX ORDER — TESTOSTERONE CYPIONATE 200 MG/ML
INJECTION, SOLUTION INTRAMUSCULAR
Qty: 4 ML | Refills: 0 | Status: SHIPPED | OUTPATIENT
Start: 2025-03-10

## 2025-03-10 NOTE — TELEPHONE ENCOUNTER
Caller: Rey Garrison Jr.    Relationship: Self    Best call back number:       [phone] (Mobile)     Requested Prescriptions:   Requested Prescriptions     Pending Prescriptions Disp Refills    Testosterone Cypionate (DEPOTESTOTERONE CYPIONATE) 200 MG/ML injection 4 mL 0     Sig: INJECT 1.5 ML EVERY 14 DAYS. Need appointment for refills      Pharmacy where request should be sent:     [facility]Y [id number] - [place], [place] - [address] [facility] [facility] [phone] [place] [phone]      Last office visit with prescribing clinician: 1/8/2025   Last telemedicine visit with prescribing clinician: Visit date not found   Next office visit with prescribing clinician: Visit date not found     Additional details provided by patient:     PATIENT STATED HE WILL BE OUT OF MEDICATION TOMORROW, 3/11    PATIENT ALSO STATED HE WILL BE LEAVING FOR OUT OF TOWN ON SATURDAY, 3/15 FOR 3 WEEKS    Does the patient have less than a 3 day supply:  [x] Yes  [] No    Would you like a call back once the refill request has been completed: [] Yes [] No    If the office needs to give you a call back, can they leave a voicemail: [] Yes [] No    Remberto Morales Rep   03/10/25 09:18 EDT

## 2025-03-11 ENCOUNTER — TELEPHONE (OUTPATIENT)
Dept: FAMILY MEDICINE CLINIC | Facility: CLINIC | Age: 75
End: 2025-03-11
Payer: COMMERCIAL

## 2025-03-11 ENCOUNTER — TELEPHONE (OUTPATIENT)
Dept: NEUROSURGERY | Facility: CLINIC | Age: 75
End: 2025-03-11
Payer: COMMERCIAL

## 2025-03-11 NOTE — TELEPHONE ENCOUNTER
PT CALLED: HE HAD HIS EMG AND XRAY COMPLETED ON 3/6/25 AND IS WANTING TO GET THE RESULTS. PLEASE ADVISE! THANKS!      XR Spine Cervical 2 or 3 View (03/06/2025 12:19)     EMG & Nerve Conduction Test (03/06/2025 11:45)     LAST SEEN: Office Visit with Kilo Cazares PA-C (04/22/2024)     PT CONTACT: [phone]  BEST TIME TO CALL: ANYTIME

## 2025-03-11 NOTE — TELEPHONE ENCOUNTER
Provider: HUBERT BARRIOS    Caller: Rey Garrison Jr.    Relationship to Patient: Self    Phone Number: [phone]     Reason for Call: PATIENT IS CALLING TO ASK IF THERE IS A BENEFIT TO TAKING THE TESTERONE WEEKLY AS OPPOSED TO TAKING IT BI- WEEKLY SPLITTING THE DOSES INTO 2.    PLEASE CALL PATIENT TO DISCUSS

## 2025-04-04 ENCOUNTER — TELEPHONE (OUTPATIENT)
Dept: FAMILY MEDICINE CLINIC | Facility: CLINIC | Age: 75
End: 2025-04-04

## 2025-04-04 NOTE — TELEPHONE ENCOUNTER
"Caller: Rey Garrison Jr. \"Ruben\"    Relationship: Self    Best call back number: [phone]    Which medication are you concerned about: FINASTERIDE 5MG    Who prescribed you this medication: DR BARRIOS    When did you start taking this medication: YEARS    What are your concerns: PATIENT WOULD LIKE TO CHANGE MEDICATION DUE TO SIDE EFFECT OF LOW LIBIDO. PLEASE ADVISE      "

## 2025-04-07 ENCOUNTER — OFFICE VISIT (OUTPATIENT)
Dept: NEUROSURGERY | Facility: CLINIC | Age: 75
End: 2025-04-07
Payer: MEDICARE

## 2025-04-07 VITALS
TEMPERATURE: 97.3 F | HEIGHT: 67 IN | SYSTOLIC BLOOD PRESSURE: 130 MMHG | WEIGHT: 174 LBS | BODY MASS INDEX: 27.31 KG/M2 | DIASTOLIC BLOOD PRESSURE: 80 MMHG

## 2025-04-07 DIAGNOSIS — G95.9 CERVICAL MYELOPATHY WITH CERVICAL RADICULOPATHY: Primary | ICD-10-CM

## 2025-04-07 DIAGNOSIS — M54.12 CERVICAL MYELOPATHY WITH CERVICAL RADICULOPATHY: Primary | ICD-10-CM

## 2025-04-07 PROCEDURE — 3075F SYST BP GE 130 - 139MM HG: CPT | Performed by: PHYSICIAN ASSISTANT

## 2025-04-07 PROCEDURE — 3079F DIAST BP 80-89 MM HG: CPT | Performed by: PHYSICIAN ASSISTANT

## 2025-04-07 PROCEDURE — 99214 OFFICE O/P EST MOD 30 MIN: CPT | Performed by: PHYSICIAN ASSISTANT

## 2025-04-07 RX ORDER — FLUOCINONIDE TOPICAL SOLUTION USP, 0.05% 0.5 MG/ML
SOLUTION TOPICAL
COMMUNITY
Start: 2025-03-11

## 2025-04-07 RX ORDER — DIAZEPAM 10 MG/1
10 TABLET ORAL ONCE
Qty: 2 TABLET | Refills: 0 | Status: SHIPPED | OUTPATIENT
Start: 2025-04-07 | End: 2025-04-07

## 2025-04-07 NOTE — PROGRESS NOTES
Patient: Rey Garrison Jr.  : 1950    Primary Care Provider: Rory Ayers MD    Chief Complaint: Gait ataxia bilateral hand numbness    History of Present Illness:         Patient is a very nice 74-year-old gentleman known to the neurosurgical practice for having cervical spondylitic myelopathy and requiring a C5-C7 anterior cervical discectomy and fusion.  The procedure was performed in 2018 procedure without event.  Patient maintained some issues with myelopathic symptoms and has noticed over the last couple years he has developed some worsening proprioception of his lower extremities feeling as though he is having to throw his feet forward.  Patient has no pain and no radicular symptoms.     Patient did have a critical illness with his lung resection requiring a rehab stabilization after asystole and CODE BLUE and a 7-day intubation.    Since last visit patient has noticed that he has had some increased difficulty with walking and is really having a lot of trouble getting around when he was in spring training.    Patient also notices that if he is inebriated with alcohol this makes his symptoms worse.    Patient had an EMG nerve conduction study done that I had ordered to rule out any obvious sign for demyelinating disease or worsening peripheral neuropathy secondary to his proprioceptive issues of his lower extremities.  Patient is back with those results    Review of Systems   Constitutional:  Negative for activity change, appetite change, chills, diaphoresis, fatigue, fever and unexpected weight change.   HENT:  Negative for congestion, dental problem, drooling, ear discharge, ear pain, facial swelling, hearing loss, mouth sores, nosebleeds, postnasal drip, rhinorrhea, sinus pressure, sinus pain, sneezing, sore throat, tinnitus, trouble swallowing and voice change.    Eyes:  Negative for photophobia, pain, discharge, redness, itching and visual disturbance.   Respiratory:  Negative for  apnea, cough, choking, chest tightness, shortness of breath, wheezing and stridor.    Cardiovascular:  Negative for chest pain, palpitations and leg swelling.   Gastrointestinal:  Negative for abdominal distention, abdominal pain, anal bleeding, blood in stool, constipation, diarrhea, nausea, rectal pain and vomiting.   Endocrine: Negative for cold intolerance, heat intolerance, polydipsia, polyphagia and polyuria.   Genitourinary:  Negative for decreased urine volume, difficulty urinating, dysuria, enuresis, flank pain, frequency, genital sores, hematuria, penile discharge, penile pain, penile swelling, scrotal swelling, testicular pain and urgency.   Musculoskeletal:  Positive for gait problem. Negative for arthralgias, back pain, joint swelling, myalgias, neck pain and neck stiffness.   Skin:  Negative for color change, pallor, rash and wound.   Allergic/Immunologic: Negative for environmental allergies, food allergies and immunocompromised state.   Neurological:  Positive for numbness. Negative for dizziness, tremors, seizures, syncope, facial asymmetry, speech difficulty, weakness, light-headedness and headaches.   Hematological:  Negative for adenopathy. Does not bruise/bleed easily.   Psychiatric/Behavioral:  Negative for agitation, behavioral problems, confusion, decreased concentration, dysphoric mood, hallucinations, self-injury, sleep disturbance and suicidal ideas. The patient is not nervous/anxious and is not hyperactive.        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 His smoking use included cigarettes. He started smoking about 58 years ago. He has a 55.2 pack-year smoking history. He has been exposed to tobacco smoke. He has never used smokeless tobacco. He reports that he does not currently use alcohol after a past usage of about 5.0 standard drinks of alcohol per week. He reports current drug use. Frequency: 2.00 times per week. Drug: Marijuana.   SMOKING STATUS: Non-smoker    Surgical History:     Past Surgical History:   Procedure Laterality Date    ANTERIOR CERVICAL DISCECTOMY W/ FUSION Bilateral 10/19/2018    Procedure: CERVICAL DISCECTOMY ANTERIOR WITH FUSION C5-7;  Surgeon: Ata Stoddard MD;  Location: [facility];  Service: Neurosurgery    COLONOSCOPY  2020?    COLONOSCOPY W/ POLYPECTOMY      Pathology consistent with hyperplastic polyp    HERNIA REPAIR      INGUINAL HERNIA REPAIR  2014    KNEE ARTHROSCOPY W/ MENISCAL REPAIR      Medial and lateral meniscus repair    LUNG CANCER SURGERY Right 04/2022    NASAL POLYP EXCISION      NECK SURGERY         Allergies:   Alprazolam, Penicillin g, Sulfamethoxazole-trimethoprim, Penicillamine, Penicillins, and Sulfa antibiotics    Physical Exam:    Vital Signs:/80 (BP Location: Right arm, Patient Position: Sitting, Cuff Size: Adult)   Temp 97.3 °F (36.3 °C) (Infrared)   Ht 170.2 cm (67\")   Wt 78.9 kg (174 lb)   BMI 27.25 kg/m²    BMI: Body mass index is 27.25 kg/m².     GENERAL:   The patient is in no acute distress, and is able to answer all questions appropriately.  Skin:  The incision is well-healed and well " approximated.  No signs of infection, bleeding, or erythema.  Musculoskeletal:     strength is 5 out of 5 bilaterally.   Shoulder abduction is 5 out of 5.    Dorsiflexion is 5/5 Bilaterally  Plantarflexion is 5/5 bilaterally  Hip Flexion 5/5 bilaterally.    The patient's gait is normal without antalgia.  Neurologic:   The patient is alert and oriented by 3.     Pupils are equal and reactive to light.    Visual fields are full.    Extraocular movements are intact without nystagmus.    There is no evidence of central motor drift. No facial droop.  No difficulty with rapid alternating movements.    Sensation is equal bilaterally with no deficit.      Reflexes:  4+ @ biceps, triceps, brachioradialis, as well as the patellar and Achilles tendon bilaterally.  Argueta's present bilaterally causing jerking all the way up to his shoulders bilaterally      Medical Decision Making    Data Review:   EMG nerve conduction study reviewed showing some mild pathology with left-sided carpal tunnel and right sided ulnar neuropathy a right peroneal neuropathy they are all mild and seemingly incidental.    Patient has no sign of cervical radicular entrapments    Diagnosis:   Chronic neck pain  Cervical myelopathy  Progressive walking unsteadiness    Treatment Options:   At last visit we confirmed that he really wished to not do an MRI which I was okay with because he stated that his symptoms were not progressing unfortunately some of his subjective symptoms have gotten worse to his eye.  His physical exam has not changed therefore I think that we need to go ahead and get another MRI.  Patient is very claustrophobic I will give him Valium.  If this is unsuccessful we may have to get a sedated MRI to check and see was going on.  Obviously with his heart history and recent lung issue I do not think that a operation would be in his best interest but I think we need to figure out what is going on to hopefully preserve his gait function.      We will see him back after the MRI and the flexion-extension x-ray to see what options we have    BMI is >= 25 and <30. (Overweight) The following options were offered after discussion;: weight loss educational material (shared in after visit summary)     Diagnosis Plan   1. Cervical myelopathy with cervical radiculopathy  MRI Cervical Spine Without Contrast    diazePAM (Valium) 10 MG tablet    XR spine cervical ap and lat w flex and ext

## 2025-04-08 ENCOUNTER — OFFICE VISIT (OUTPATIENT)
Dept: FAMILY MEDICINE CLINIC | Facility: CLINIC | Age: 75
End: 2025-04-08
Payer: MEDICARE

## 2025-04-08 ENCOUNTER — LAB (OUTPATIENT)
Dept: LAB | Facility: HOSPITAL | Age: 75
End: 2025-04-08
Payer: MEDICARE

## 2025-04-08 VITALS
TEMPERATURE: 98.6 F | OXYGEN SATURATION: 96 % | WEIGHT: 174 LBS | SYSTOLIC BLOOD PRESSURE: 118 MMHG | HEIGHT: 67 IN | HEART RATE: 63 BPM | DIASTOLIC BLOOD PRESSURE: 88 MMHG | BODY MASS INDEX: 27.31 KG/M2

## 2025-04-08 DIAGNOSIS — E29.1 HYPOGONADISM IN MALE: ICD-10-CM

## 2025-04-08 DIAGNOSIS — R35.1 BENIGN PROSTATIC HYPERPLASIA WITH NOCTURIA: ICD-10-CM

## 2025-04-08 DIAGNOSIS — Z12.5 PROSTATE CANCER SCREENING: ICD-10-CM

## 2025-04-08 DIAGNOSIS — E11.9 TYPE 2 DIABETES MELLITUS WITHOUT COMPLICATION, WITHOUT LONG-TERM CURRENT USE OF INSULIN: ICD-10-CM

## 2025-04-08 DIAGNOSIS — E11.9 TYPE 2 DIABETES MELLITUS WITHOUT COMPLICATION, WITHOUT LONG-TERM CURRENT USE OF INSULIN: Primary | ICD-10-CM

## 2025-04-08 DIAGNOSIS — I10 ESSENTIAL HYPERTENSION: ICD-10-CM

## 2025-04-08 DIAGNOSIS — N40.1 BENIGN PROSTATIC HYPERPLASIA WITH NOCTURIA: ICD-10-CM

## 2025-04-08 DIAGNOSIS — R79.89 LOW TESTOSTERONE IN MALE: ICD-10-CM

## 2025-04-08 LAB
ALBUMIN/CREATININE RATIO, URINE: <30
CHOLEST SERPL-MCNC: 188 MG/DL (ref 0–200)
EXPIRATION DATE: NORMAL
HBA1C MFR BLD: 5.5 % (ref 4.8–5.6)
HDLC SERPL-MCNC: 49 MG/DL (ref 40–60)
LDLC SERPL CALC-MCNC: 128 MG/DL (ref 0–100)
LDLC/HDLC SERPL: 2.6 {RATIO}
Lab: NORMAL
POC CREATININE URINE: 300
POC MICROALBUMIN URINE: 80
PSA SERPL-MCNC: 1.09 NG/ML (ref 0–4)
TESTOST SERPL-MCNC: >1500 NG/DL (ref 193–740)
TRIGL SERPL-MCNC: 58 MG/DL (ref 0–150)
VLDLC SERPL-MCNC: 11 MG/DL (ref 5–40)

## 2025-04-08 PROCEDURE — 83036 HEMOGLOBIN GLYCOSYLATED A1C: CPT

## 2025-04-08 PROCEDURE — 84403 ASSAY OF TOTAL TESTOSTERONE: CPT

## 2025-04-08 PROCEDURE — 80061 LIPID PANEL: CPT

## 2025-04-08 PROCEDURE — G0103 PSA SCREENING: HCPCS

## 2025-04-08 RX ORDER — TESTOSTERONE CYPIONATE 200 MG/ML
INJECTION, SOLUTION INTRAMUSCULAR
Qty: 4 ML | Refills: 0 | Status: SHIPPED | OUTPATIENT
Start: 2025-04-08

## 2025-04-08 RX ORDER — DOXAZOSIN 4 MG/1
6 TABLET ORAL DAILY
Qty: 135 TABLET | Refills: 3 | Status: SHIPPED | OUTPATIENT
Start: 2025-04-08

## 2025-04-08 RX ORDER — DUTASTERIDE 0.5 MG/1
0.5 CAPSULE, LIQUID FILLED ORAL DAILY
Qty: 90 CAPSULE | Refills: 3 | Status: SHIPPED | OUTPATIENT
Start: 2025-04-08

## 2025-04-08 NOTE — PROGRESS NOTES
Rey Garrison Jr. is a 75 y.o. male who presents today for Hypertension, Hypogonadism, and Diabetes      HPI     He has been taking testosterone injection every two weeks and has tolerated well. He has been taking doxazosin and diltiazem daily and has been checking blood pressure at home and was getting lows so cut the doxazosin down and has not been seeing numbers close to what we got here today. He has not been taking anything for blood sugar. He walks for exercise as much as he can. He eats a generally healthy and varied diet and has been able to keep A1c in the prediabetic. Finasteride has caused decreased libido and he would like to try something different.     Review of Systems   Constitutional:  Negative for chills, diaphoresis, fatigue, fever and unexpected weight loss.   HENT:  Negative for congestion, ear pain, sore throat and swollen glands.    Eyes:  Negative for visual disturbance.   Respiratory:  Negative for cough, shortness of breath and wheezing.    Cardiovascular:  Negative for chest pain and palpitations.   Gastrointestinal:  Negative for abdominal pain, blood in stool, constipation, diarrhea, nausea, vomiting and GERD.   Endocrine: Negative for polydipsia and polyuria.   Genitourinary:  Positive for decreased libido and erectile dysfunction. Negative for difficulty urinating and dysuria.   Musculoskeletal:  Positive for neck pain (chronic). Negative for joint swelling and myalgias.   Skin:  Negative for rash and skin lesions.   Allergic/Immunologic: Negative for environmental allergies.   Neurological:  Negative for seizures, syncope, weakness and numbness.   Hematological:  Does not bruise/bleed easily.   Psychiatric/Behavioral:  Negative for suicidal ideas.         The following portions of the patient's history were reviewed and updated as appropriate: allergies, current medications, past family history, past medical history, past social history, past surgical history and problem  list.    Current Outpatient Medications on File Prior to Visit   Medication Sig Dispense Refill    albuterol sulfate  (90 Base) MCG/ACT inhaler INHALE 2 PUFFS EVERY 4 HOURS AS NEEDED FOR WHEEZING OR SHORTNESS OF AIR 16 g 1    dilTIAZem HCl ER (CARDIZEM LA) 180 MG 24 hr tablet Take 1 tablet by mouth Daily.      Eliquis 5 MG tablet tablet TAKE 1 TABLET BY MOUTH EVERY 12 HOURS 60 tablet 1    famotidine (PEPCID) 20 MG tablet 1 tablet.      fluocinolone acetonide (DERMOTIC) 0.01 % oil otic oil Administer  into both ears 2 (Two) Times a Day. 20 mL 5    fluocinonide (LIDEX) 0.05 % external solution APPLY TOPICALLY TO SCALP EVERY DAY      Fluticasone-Umeclidin-Vilant (Trelegy Ellipta) 200-62.5-25 MCG/ACT inhaler Inhale 1 puff Daily. 180 each 0    Multiple Vitamin (MULTI-VITAMIN DAILY PO) Take 1 tablet by mouth Daily.      ondansetron (ZOFRAN) 8 MG tablet Take 1 tablet by mouth.      sildenafil (VIAGRA) 100 MG tablet Take 1 tablet by mouth Daily As Needed for Erectile Dysfunction. 15 tablet 5    [DISCONTINUED] doxazosin (CARDURA) 4 MG tablet TAKE 2 TABLETS BY MOUTH EVERY  tablet 2    [DISCONTINUED] finasteride (PROSCAR) 5 MG tablet TAKE 1 TABLET BY MOUTH EVERY DAY 90 tablet 3    [DISCONTINUED] Testosterone Cypionate (DEPOTESTOTERONE CYPIONATE) 200 MG/ML injection INJECT 1.5 ML EVERY 14 DAYS. Need appointment for refills 4 mL 0    [] diazePAM (Valium) 10 MG tablet Take 1 tablet by mouth 1 time for 1 dose. Take 1 pill 30 minutes prior to MRI, then 1 pill as needed 2 tablet 0    [DISCONTINUED] fluocinolone (SYNALAR) 0.025 % cream Apply 1 Application topically to the appropriate area as directed 2 (Two) Times a Day. For scalp (Patient not taking: Reported on 2025) 60 g 1    [DISCONTINUED] ketoconazole (NIZORAL) 2 % cream Apply to appropriate area twice daily (Patient not taking: Reported on 2025) 45 g 5    [DISCONTINUED] mupirocin (BACTROBAN) 2 % ointment Apply 1 Application topically to the  "appropriate area as directed 3 (Three) Times a Day. Do not put in mouth or nose (Patient not taking: Reported on 4/8/2025) 60 g 1    [DISCONTINUED] triamcinolone (KENALOG) 0.5 % cream Apply 1 application topically to the appropriate area as directed 2 (Two) Times a Day. (Patient not taking: Reported on 4/8/2025) 30 g 3     No current facility-administered medications on file prior to visit.       Allergies   Allergen Reactions    Alprazolam Hallucinations    Penicillin G Unknown - Low Severity    Sulfamethoxazole-Trimethoprim Hallucinations    Penicillamine Unknown (See Comments)     As a child     Penicillins Itching     Causes both the hands and the feet to itch     Sulfa Antibiotics Itching     Causes both the hands and the feet to itch    Causes both the hands and the feet to itch    Other reaction(s): Itching, Rash        Visit Vitals  /88   Pulse 63   Temp 98.6 °F (37 °C) (Infrared)   Ht 170.2 cm (67.01\")   Wt 78.9 kg (174 lb)   SpO2 96%   BMI 27.25 kg/m²        Physical Exam  Constitutional:       General: He is not in acute distress.     Appearance: He is well-developed. He is not diaphoretic.   HENT:      Head: Atraumatic.   Cardiovascular:      Rate and Rhythm: Normal rate and regular rhythm.      Heart sounds: Normal heart sounds. No murmur heard.     No friction rub. No gallop.   Pulmonary:      Effort: Pulmonary effort is normal. No respiratory distress.      Breath sounds: Normal breath sounds. No stridor. No wheezing, rhonchi or rales.   Musculoskeletal:      Cervical back: Normal range of motion and neck supple.   Skin:     General: Skin is warm and dry.   Neurological:      Mental Status: He is alert and oriented to person, place, and time.   Psychiatric:         Behavior: Behavior normal.          Results for orders placed or performed in visit on 04/08/25   POC Microalbumin    Collection Time: 04/08/25 10:59 AM    Specimen: Urine   Result Value Ref Range    Microalbumin, Urine 80     " Creatinine, Urine 300     Lot Number [id number]     Expiration Date 01/12/2025     Albumin/Creatinine Ratio, Urine <30         Problems Addressed this Visit          Cardiac and Vasculature    Essential hypertension    Hypertension is stable and controlled  Continue current treatment regimen.  Blood pressure will be reassessed in 3 months.         Relevant Medications    doxazosin (CARDURA) 4 MG tablet    Other Relevant Orders    POC Microalbumin (Completed)    Lipid Panel    Hemoglobin A1c       Endocrine and Metabolic    Type 2 diabetes mellitus - Primary    Diabetes is stable.   Continue current treatment regimen.  Diabetes will be reassessed in 6 months         Relevant Orders    POC Microalbumin (Completed)    Lipid Panel    Hemoglobin A1c    Hypogonadism in male    Chronic and stable.  Patient is tolerated testosterone injections well. The patient is taking the following controlled substance(s) on a long term (greater than three months) basis:  Testosterone .  He is taking controlled substances for other (hypogonadism).  A history was obtained from the patient and the following were reviewed: family history, social history, psychiatric history, and substance abuse history.  A baseline assessment was performed and includes a controlled substance agreement, urine drug screen, ABENA (within 12 months prior to today's encounter), and a physical exam, if appropriate, was performed within the past year.  It is medically appropriate to prescribe him the medication(s) above.  If applicable, prior treatment records were obtained and reviewed to justify long term prescribing of controlled substances.  The patient was educated on the following: Limited use of the medication, need to discontinue the medication when his condition resolves, proper storage and disposal of medication(s), and risks and dangers associated with controlled substances including tolerance, dependence, and addiction.  A written informed consent  was obtained and includes objectives of treatment, further diagnostic testing if appropriate, and an exit strategy for discontinuing the medication on the condition resolves, if appropriate.  In addition, if appropriate, the patient will be screened for other medical conditions that may impact the prescribing of controlled substances.  Previous treatments have been tried including trials of noncontrolled substances or lower doses of controlled substances.  The controlled medication(s) have been titrated to the level appropriate and necessary and the medication(s) are not causing unacceptable side effects.  Patient will follow-up in 3 months for annual physical exam and we will obtain labs at that time.          Relevant Medications    Testosterone Cypionate (DEPOTESTOTERONE CYPIONATE) 200 MG/ML injection    Other Relevant Orders    PSA Screen    Testosterone    Low testosterone in male    Relevant Medications    Testosterone Cypionate (DEPOTESTOTERONE CYPIONATE) 200 MG/ML injection       Genitourinary and Reproductive     Benign prostatic hypertrophy    Patient feels that the finasteride was contributing to decreased libido.  Will try switching him to dutasteride to see if he tolerates better.          Relevant Medications    dutasteride (Avodart) 0.5 MG capsule    Prostate cancer screening    Relevant Orders    PSA Screen     Diagnoses         Codes Comments      Type 2 diabetes mellitus without complication, without long-term current use of insulin    -  Primary ICD-10-CM: E11.9  ICD-9-CM: 250.00       Essential hypertension     ICD-10-CM: I10  ICD-9-CM: 401.9       Hypogonadism in male     ICD-10-CM: E29.1  ICD-9-CM: 257.2       Prostate cancer screening     ICD-10-CM: Z12.5  ICD-9-CM: V76.44       Low testosterone in male     ICD-10-CM: R79.89  ICD-9-CM: 790.99       Benign prostatic hyperplasia with nocturia     ICD-10-CM: N40.1, R35.1  ICD-9-CM: 600.01, 788.43             Return in about 3 months (around  7/8/2025) for Medicare Wellness.    Rory Ayers MD   4/8/2025

## 2025-04-08 NOTE — ASSESSMENT & PLAN NOTE
Patient feels that the finasteride was contributing to decreased libido.  Will try switching him to dutasteride to see if he tolerates better.

## 2025-04-08 NOTE — PATIENT INSTRUCTIONS
"Hypertension, Adult  High blood pressure (hypertension) is when the force of blood pumping through the arteries is too strong. The arteries are the blood vessels that carry blood from the heart throughout the body. Hypertension forces the heart to work harder to pump blood and may cause arteries to become narrow or stiff. Untreated or uncontrolled hypertension can lead to a heart attack, heart failure, a stroke, kidney disease, and other problems.  A blood pressure reading consists of a higher number over a lower number. Ideally, your blood pressure should be below 120/80. The first (\"top\") number is called the systolic pressure. It is a measure of the pressure in your arteries as your heart beats. The second (\"bottom\") number is called the diastolic pressure. It is a measure of the pressure in your arteries as the heart relaxes.  What are the causes?  The exact cause of this condition is not known. There are some conditions that result in high blood pressure.  What increases the risk?  Certain factors may make you more likely to develop high blood pressure. Some of these risk factors are under your control, including:  Smoking.  Not getting enough exercise or physical activity.  Being overweight.  Having too much fat, sugar, calories, or salt (sodium) in your diet.  Drinking too much alcohol.  Other risk factors include:  Having a personal history of heart disease, diabetes, high cholesterol, or kidney disease.  Stress.  Having a family history of high blood pressure and high cholesterol.  Having obstructive sleep apnea.  Age. The risk increases with age.  What are the signs or symptoms?  High blood pressure may not cause symptoms. Very high blood pressure (hypertensive crisis) may cause:  Headache.  Fast or irregular heartbeats (palpitations).  Shortness of breath.  Nosebleed.  Nausea and vomiting.  Vision changes.  Severe chest pain, dizziness, and seizures.  How is this diagnosed?  This condition is diagnosed by " measuring your blood pressure while you are seated, with your arm resting on a flat surface, your legs uncrossed, and your feet flat on the floor. The cuff of the blood pressure monitor will be placed directly against the skin of your upper arm at the level of your heart. Blood pressure should be measured at least twice using the same arm. Certain conditions can cause a difference in blood pressure between your right and left arms.  If you have a high blood pressure reading during one visit or you have normal blood pressure with other risk factors, you may be asked to:  Return on a different day to have your blood pressure checked again.  Monitor your blood pressure at home for 1 week or longer.  If you are diagnosed with hypertension, you may have other blood or imaging tests to help your health care provider understand your overall risk for other conditions.  How is this treated?  This condition is treated by making healthy lifestyle changes, such as eating healthy foods, exercising more, and reducing your alcohol intake. You may be referred for counseling on a healthy diet and physical activity.  Your health care provider may prescribe medicine if lifestyle changes are not enough to get your blood pressure under control and if:  Your systolic blood pressure is above 130.  Your diastolic blood pressure is above 80.  Your personal target blood pressure may vary depending on your medical conditions, your age, and other factors.  Follow these instructions at home:  Eating and drinking    Eat a diet that is high in fiber and potassium, and low in sodium, added sugar, and fat. An example of this eating plan is called the DASH diet. DASH stands for Dietary Approaches to Stop Hypertension. To eat this way:  Eat plenty of fresh fruits and vegetables. Try to fill one half of your plate at each meal with fruits and vegetables.  Eat whole grains, such as whole-wheat pasta, brown rice, or whole-grain bread. Fill about one  fourth of your plate with whole grains.  Eat or drink low-fat dairy products, such as skim milk or low-fat yogurt.  Avoid fatty cuts of meat, processed or cured meats, and poultry with skin. Fill about one fourth of your plate with lean proteins, such as fish, chicken without skin, beans, eggs, or tofu.  Avoid pre-made and processed foods. These tend to be higher in sodium, added sugar, and fat.  Reduce your daily sodium intake. Many people with hypertension should eat less than 1,500 mg of sodium a day.  Do not drink alcohol if:  Your health care provider tells you not to drink.  You are pregnant, may be pregnant, or are planning to become pregnant.  If you drink alcohol:  Limit how much you have to:  0-1 drink a day for women.  0-2 drinks a day for men.  Know how much alcohol is in your drink. In the [place] one drink equals one 12 oz bottle of beer (355 mL), one 5 oz glass of wine (148 mL), or one 1½ oz glass of hard liquor (44 mL).  Lifestyle    Work with your health care provider to maintain a healthy body weight or to lose weight. Ask what an ideal weight is for you.  Get at least 30 minutes of exercise that causes your heart to beat faster (aerobic exercise) most days of the week. Activities may include walking, swimming, or biking.  Include exercise to strengthen your muscles (resistance exercise), such as Pilates or lifting weights, as part of your weekly exercise routine. Try to do these types of exercises for 30 minutes at least 3 days a week.  Do not use any products that contain nicotine or tobacco. These products include cigarettes, chewing tobacco, and vaping devices, such as e-cigarettes. If you need help quitting, ask your health care provider.  Monitor your blood pressure at home as told by your health care provider.  Keep all follow-up visits. This is important.  Medicines  Take over-the-counter and prescription medicines only as told by your health care provider. Follow directions carefully. Blood  pressure medicines must be taken as prescribed.  Do not skip doses of blood pressure medicine. Doing this puts you at risk for problems and can make the medicine less effective.  Ask your health care provider about side effects or reactions to medicines that you should watch for.  Contact a health care provider if you:  Think you are having a reaction to a medicine you are taking.  Have headaches that keep coming back (recurring).  Feel dizzy.  Have swelling in your ankles.  Have trouble with your vision.  Get help right away if you:  Develop a severe headache or confusion.  Have unusual weakness or numbness.  Feel faint.  Have severe pain in your chest or abdomen.  Vomit repeatedly.  Have trouble breathing.  These symptoms may be an emergency. Get help right away. Call 911.  Do not wait to see if the symptoms will go away.  Do not drive yourself to the hospital.  Summary  Hypertension is when the force of blood pumping through your arteries is too strong. If this condition is not controlled, it may put you at risk for serious complications.  Your personal target blood pressure may vary depending on your medical conditions, your age, and other factors. For most people, a normal blood pressure is less than 120/80.  Hypertension is treated with lifestyle changes, medicines, or a combination of both. Lifestyle changes include losing weight, eating a healthy, low-sodium diet, exercising more, and limiting alcohol.  This information is not intended to replace advice given to you by your health care provider. Make sure you discuss any questions you have with your health care provider.  Document Revised: 10/25/2022 Document Reviewed: 10/25/2022  Elsevier Patient Education © 2024 Elsevier Inc.Diabetes Mellitus and Nutrition, Adult  When you have diabetes, or diabetes mellitus, it is very important to have healthy eating habits because your blood sugar (glucose) levels are greatly affected by what you eat and drink. Eating  healthy foods in the right amounts, at about the same times every day, can help you:  Manage your blood glucose.  Lower your risk of heart disease.  Improve your blood pressure.  Reach or maintain a healthy weight.  What can affect my meal plan?  Every person with diabetes is different, and each person has different needs for a meal plan. Your health care provider may recommend that you work with a dietitian to make a meal plan that is best for you. Your meal plan may vary depending on factors such as:  The calories you need.  The medicines you take.  Your weight.  Your blood glucose, blood pressure, and cholesterol levels.  Your activity level.  Other health conditions you have, such as heart or kidney disease.  How do carbohydrates affect me?  Carbohydrates, also called carbs, affect your blood glucose level more than any other type of food. Eating carbs raises the amount of glucose in your blood.  It is important to know how many carbs you can safely have in each meal. This is different for every person. Your dietitian can help you calculate how many carbs you should have at each meal and for each snack.  How does alcohol affect me?  Alcohol can cause a decrease in blood glucose (hypoglycemia), especially if you use insulin or take certain diabetes medicines by mouth. Hypoglycemia can be a life-threatening condition. Symptoms of hypoglycemia, such as sleepiness, dizziness, and confusion, are similar to symptoms of having too much alcohol.  Do not drink alcohol if:  Your health care provider tells you not to drink.  You are pregnant, may be pregnant, or are planning to become pregnant.  If you drink alcohol:  Limit how much you have to:  0-1 drink a day for women.  0-2 drinks a day for men.  Know how much alcohol is in your drink. In the [place] one drink equals one 12 oz bottle of beer (355 mL), one 5 oz glass of wine (148 mL), or one 1½ oz glass of hard liquor (44 mL).  Keep yourself hydrated with water, diet  "soda, or unsweetened iced tea. Keep in mind that regular soda, juice, and other mixers may contain a lot of sugar and must be counted as carbs.  What are tips for following this plan?    Reading food labels  Start by checking the serving size on the Nutrition Facts label of packaged foods and drinks. The number of calories and the amount of carbs, fats, and other nutrients listed on the label are based on one serving of the item. Many items contain more than one serving per package.  Check the total grams (g) of carbs in one serving.  Check the number of grams of saturated fats and trans fats in one serving. Choose foods that have a low amount or none of these fats.  Check the number of milligrams (mg) of salt (sodium) in one serving. Most people should limit total sodium intake to less than 2,300 mg per day.  Always check the nutrition information of foods labeled as \"low-fat\" or \"nonfat.\" These foods may be higher in added sugar or refined carbs and should be avoided.  Talk to your dietitian to identify your daily goals for nutrients listed on the label.  Shopping  Avoid buying canned, pre-made, or processed foods. These foods tend to be high in fat, sodium, and added sugar.  Shop around the outside edge of the grocery store. This is where you will most often find fresh fruits and vegetables, bulk grains, fresh meats, and fresh dairy products.  Cooking  Use low-heat cooking methods, such as baking, instead of high-heat cooking methods, such as deep frying.  Cook using healthy oils, such as olive, canola, or sunflower oil.  Avoid cooking with butter, cream, or high-fat meats.  Meal planning  Eat meals and snacks regularly, preferably at the same times every day. Avoid going long periods of time without eating.  Eat foods that are high in fiber, such as fresh fruits, vegetables, beans, and whole grains.  Eat 4-6 oz (112-168 g) of lean protein each day, such as lean meat, chicken, fish, eggs, or tofu. One ounce (oz) " (28 g) of lean protein is equal to:  1 oz (28 g) of meat, chicken, or fish.  1 egg.  ¼ cup (62 g) of tofu.  Eat some foods each day that contain healthy fats, such as avocado, nuts, seeds, and fish.  What foods should I eat?  Fruits  Berries. Apples. Oranges. Peaches. Apricots. Plums. Grapes. Mangoes. Papayas. Pomegranates. Kiwi. Cherries.  Vegetables  Leafy greens, including lettuce, spinach, kale, chard, manuel greens, mustard greens, and cabbage. Beets. Cauliflower. Broccoli. Carrots. Green beans. Tomatoes. Peppers. Onions. Cucumbers. [place] sprouts.  Grains  Whole grains, such as whole-wheat or whole-grain bread, crackers, tortillas, cereal, and pasta. Unsweetened oatmeal. Quinoa. Brown or wild rice.  Meats and other proteins  Seafood. Poultry without skin. Lean cuts of poultry and beef. Tofu. Nuts. Seeds.  Dairy  Low-fat or fat-free dairy products such as milk, yogurt, and cheese.  The items listed above may not be a complete list of foods and beverages you can eat and drink. Contact a dietitian for more information.  What foods should I avoid?  Fruits  Fruits canned with syrup.  Vegetables  Canned vegetables. Frozen vegetables with butter or cream sauce.  Grains  Refined white flour and flour products such as bread, pasta, snack foods, and cereals. Avoid all processed foods.  Meats and other proteins  Fatty cuts of meat. Poultry with skin. Breaded or fried meats. Processed meat. Avoid saturated fats.  Dairy  Full-fat yogurt, cheese, or milk.  Beverages  Sweetened drinks, such as soda or iced tea.  The items listed above may not be a complete list of foods and beverages you should avoid. Contact a dietitian for more information.  Questions to ask a health care provider  Do I need to meet with a certified diabetes care and ?  Do I need to meet with a dietitian?  What number can I call if I have questions?  When are the best times to check my blood glucose?  Where to find more  information:  American Diabetes Association: diabetes.org  Academy of Nutrition and Dietetics: eatright.org  National [place] of Diabetes and Digestive and Kidney Diseases: niddk.nih.gov  Association of Diabetes Care & Education Specialists: diabeteseducator.org  Summary  It is important to have healthy eating habits because your blood sugar (glucose) levels are greatly affected by what you eat and drink. It is important to use alcohol carefully.  A healthy meal plan will help you manage your blood glucose and lower your risk of heart disease.  Your health care provider may recommend that you work with a dietitian to make a meal plan that is best for you.  This information is not intended to replace advice given to you by your health care provider. Make sure you discuss any questions you have with your health care provider.  Document Revised: 07/20/2021 Document Reviewed: 07/21/2021  Elsevier Patient Education © 2024 Elsevier Inc.

## 2025-04-16 ENCOUNTER — TELEPHONE (OUTPATIENT)
Dept: NEUROSURGERY | Facility: CLINIC | Age: 75
End: 2025-04-16
Payer: COMMERCIAL

## 2025-04-16 NOTE — TELEPHONE ENCOUNTER
Caller: JEFFREY MARTINS    Relationship: SELF    Best call back number: [phone]    Which medication are you concerned about: DIAZEPAM 10MG    Who prescribed you this medication: MALA CARDENAS     When did you start taking this medication: BEFORE MRI 4/19    What are your concerns: PT CALLED AND WANTS TO KNOW IF HE CAN DRIVE AFTER TAKING THIS MEDICATION FOR HIS MRI 4/19 - PLEASE ADVISE IN A [employer] MESSAGE - PT STATES HIS PHONE WILL GO TO VM

## 2025-04-19 ENCOUNTER — HOSPITAL ENCOUNTER (OUTPATIENT)
Facility: HOSPITAL | Age: 75
Discharge: HOME | End: 2025-04-19
Payer: MEDICARE

## 2025-04-19 DIAGNOSIS — G95.9 CERVICAL MYELOPATHY WITH CERVICAL RADICULOPATHY: ICD-10-CM

## 2025-04-19 DIAGNOSIS — M54.12 CERVICAL MYELOPATHY WITH CERVICAL RADICULOPATHY: ICD-10-CM

## 2025-04-19 PROCEDURE — 72050 X-RAY EXAM NECK SPINE 4/5VWS: CPT

## 2025-04-19 PROCEDURE — 72141 MRI NECK SPINE W/O DYE: CPT

## 2025-04-29 DIAGNOSIS — E29.1 HYPOGONADISM IN MALE: ICD-10-CM

## 2025-04-29 DIAGNOSIS — Z76.0 MEDICATION REFILL: ICD-10-CM

## 2025-04-29 DIAGNOSIS — J44.9 CHRONIC OBSTRUCTIVE PULMONARY DISEASE, UNSPECIFIED COPD TYPE: ICD-10-CM

## 2025-04-29 DIAGNOSIS — R79.89 LOW TESTOSTERONE IN MALE: ICD-10-CM

## 2025-04-29 RX ORDER — TESTOSTERONE CYPIONATE 200 MG/ML
INJECTION, SOLUTION INTRAMUSCULAR
Qty: 4 ML | Refills: 0 | Status: SHIPPED | OUTPATIENT
Start: 2025-04-29

## 2025-04-29 RX ORDER — FLUTICASONE FUROATE, UMECLIDINIUM BROMIDE AND VILANTEROL TRIFENATATE 200; 62.5; 25 UG/1; UG/1; UG/1
1 POWDER RESPIRATORY (INHALATION)
Qty: 180 EACH | Refills: 0 | Status: SHIPPED | OUTPATIENT
Start: 2025-04-29

## 2025-05-23 ENCOUNTER — LAB (OUTPATIENT)
Dept: LAB | Facility: HOSPITAL | Age: 75
End: 2025-05-23
Payer: MEDICARE

## 2025-05-23 ENCOUNTER — OFFICE VISIT (OUTPATIENT)
Dept: FAMILY MEDICINE CLINIC | Facility: CLINIC | Age: 75
End: 2025-05-23
Payer: MEDICARE

## 2025-05-23 VITALS
BODY MASS INDEX: 27.62 KG/M2 | HEART RATE: 70 BPM | TEMPERATURE: 98.9 F | OXYGEN SATURATION: 96 % | SYSTOLIC BLOOD PRESSURE: 122 MMHG | DIASTOLIC BLOOD PRESSURE: 76 MMHG | WEIGHT: 176 LBS | HEIGHT: 67 IN

## 2025-05-23 DIAGNOSIS — N40.1 BENIGN PROSTATIC HYPERPLASIA WITH NOCTURIA: ICD-10-CM

## 2025-05-23 DIAGNOSIS — I48.0 PAROXYSMAL ATRIAL FIBRILLATION: ICD-10-CM

## 2025-05-23 DIAGNOSIS — E29.1 HYPOGONADISM IN MALE: ICD-10-CM

## 2025-05-23 DIAGNOSIS — E29.1 HYPOGONADISM IN MALE: Primary | ICD-10-CM

## 2025-05-23 DIAGNOSIS — I10 ESSENTIAL HYPERTENSION: ICD-10-CM

## 2025-05-23 DIAGNOSIS — R35.1 BENIGN PROSTATIC HYPERPLASIA WITH NOCTURIA: ICD-10-CM

## 2025-05-23 LAB — TESTOST SERPL-MCNC: 265 NG/DL (ref 193–740)

## 2025-05-23 PROCEDURE — 84403 ASSAY OF TOTAL TESTOSTERONE: CPT

## 2025-05-23 PROCEDURE — 99214 OFFICE O/P EST MOD 30 MIN: CPT | Performed by: PHYSICIAN ASSISTANT

## 2025-05-23 PROCEDURE — 3078F DIAST BP <80 MM HG: CPT | Performed by: PHYSICIAN ASSISTANT

## 2025-05-23 PROCEDURE — 1159F MED LIST DOCD IN RCRD: CPT | Performed by: PHYSICIAN ASSISTANT

## 2025-05-23 PROCEDURE — 1160F RVW MEDS BY RX/DR IN RCRD: CPT | Performed by: PHYSICIAN ASSISTANT

## 2025-05-23 PROCEDURE — 3044F HG A1C LEVEL LT 7.0%: CPT | Performed by: PHYSICIAN ASSISTANT

## 2025-05-23 PROCEDURE — 36415 COLL VENOUS BLD VENIPUNCTURE: CPT

## 2025-05-23 PROCEDURE — 3074F SYST BP LT 130 MM HG: CPT | Performed by: PHYSICIAN ASSISTANT

## 2025-05-23 PROCEDURE — 1126F AMNT PAIN NOTED NONE PRSNT: CPT | Performed by: PHYSICIAN ASSISTANT

## 2025-05-23 RX ORDER — DUTASTERIDE 0.5 MG/1
0.5 CAPSULE, LIQUID FILLED ORAL DAILY
Start: 2025-05-23

## 2025-05-23 RX ORDER — CLOBETASOL PROPIONATE 0.5 MG/ML
SOLUTION TOPICAL
COMMUNITY
Start: 2025-05-07

## 2025-05-23 RX ORDER — CLOBETASOL PROPIONATE 0.05 G/100ML
SHAMPOO TOPICAL
COMMUNITY
Start: 2025-05-07

## 2025-05-23 NOTE — PROGRESS NOTES
Follow Up Office Visit      Patient Name: Rey Garrison Jr.  : 1950   MRN: [id number]     Chief Complaint:    Chief Complaint   Patient presents with    Follow-up     Per patient he would like to have blood test to check his testosterone.       History of Present Illness  The patient presents for evaluation of testosterone management.     He is currently on a regimen of 1.5 mL of testosterone every 14 days, which he reports as being effective. He has been maintaining this dosage for an extended period. His last testosterone injection was administered 2 weeks ago. He does not routinely monitor his testosterone levels monthly but wishes to do so today due to a previous high reading.  He does however complete routine screening labs as he is followed very closely by his oncologist and cardiologist.    Patient also has a history of BPH, for which he previously took finasteride, though this was switched to dutasteride as he felt finasteride was contributing to decreased libido.  He feels he is continuing to experience decreased libido from dutasteride, and inquires about trying Flomax as an alternative.        Subjective      I have reviewed and the following portions of the patient's history were updated as appropriate: past family history, past medical history, past social history, past surgical history and problem list.    Medications:     Current Outpatient Medications:     albuterol sulfate  (90 Base) MCG/ACT inhaler, INHALE 2 PUFFS EVERY 4 HOURS AS NEEDED FOR WHEEZING OR SHORTNESS OF AIR, Disp: 16 g, Rfl: 1    clobetasol (TEMOVATE) 0.05 % external solution, APPLY TO AFFECTED AREA X 2 WEEKS AND THEN STOP. CAN RESTART IN 2 WEEKS IF NEEDED. AVOID FACE, Disp: , Rfl:     clobetasol propionate (CLOBEX) 0.05 % shampoo, APPLY TO SCALP 2-3 TIMES A WEEK. LET SIT FOR A FEW MINUTES BEFORE RINSING., Disp: , Rfl:     dilTIAZem HCl ER (CARDIZEM LA) 180 MG 24 hr tablet, Take 1 tablet by mouth Daily., Disp: ,  Rfl:     doxazosin (CARDURA) 4 MG tablet, Take 1.5 tablets by mouth Daily., Disp: 135 tablet, Rfl: 3    dutasteride (Avodart) 0.5 MG capsule, Take 1 capsule by mouth Daily., Disp: , Rfl:     famotidine (PEPCID) 20 MG tablet, 1 tablet., Disp: , Rfl:     fluocinolone acetonide (DERMOTIC) 0.01 % oil otic oil, Administer  into both ears 2 (Two) Times a Day., Disp: 20 mL, Rfl: 5    fluocinonide (LIDEX) 0.05 % external solution, APPLY TOPICALLY TO SCALP EVERY DAY, Disp: , Rfl:     Multiple Vitamin (MULTI-VITAMIN DAILY PO), Take 1 tablet by mouth Daily., Disp: , Rfl:     sildenafil (VIAGRA) 100 MG tablet, Take 1 tablet by mouth Daily As Needed for Erectile Dysfunction., Disp: 15 tablet, Rfl: 5    Testosterone Cypionate (DEPOTESTOTERONE CYPIONATE) 200 MG/ML injection, INJECT 1.5 ML EVERY 14 DAYS. NEED APPOINTMENT FOR REFILLS, Disp: 4 mL, Rfl: 0    Trelegy Ellipta 200-62.5-25 MCG/ACT inhaler, INHALE 1 PUFF DAILY, Disp: 180 each, Rfl: 0    Allergies:   Allergies   Allergen Reactions    Alprazolam Hallucinations    Penicillin G Unknown - Low Severity    Sulfamethoxazole-Trimethoprim Hallucinations    Penicillamine Unknown (See Comments)     As a child     Penicillins Itching     Causes both the hands and the feet to itch     Sulfa Antibiotics Itching     Causes both the hands and the feet to itch    Causes both the hands and the feet to itch    Other reaction(s): Itching, Rash       Objective     Physical Exam:   Physical Exam  Constitutional:       General: He is not in acute distress.     Appearance: He is not ill-appearing.   HENT:      Head: Normocephalic.   Cardiovascular:      Rate and Rhythm: Normal rate and regular rhythm.      Heart sounds: No murmur heard.  Pulmonary:      Effort: Pulmonary effort is normal. No respiratory distress.      Breath sounds: Normal breath sounds.   Musculoskeletal:         General: Normal range of motion.   Skin:     General: Skin is warm.   Neurological:      General: No focal deficit  "present.      Mental Status: He is alert.   Psychiatric:         Mood and Affect: Mood normal.         Vital Signs:   Vitals:    05/23/25 1351   BP: 122/76   Pulse: 70   Temp: 98.9 °F (37.2 °C)   TempSrc: Infrared   SpO2: 96%   Weight: 79.8 kg (176 lb)   Height: 170.2 cm (67.01\")   PainSc: 0-No pain     Body mass index is 27.56 kg/m².         Procedures    Assessment / Plan         Assessment and Plan     Diagnoses and all orders for this visit:    1. Hypogonadism in male (Primary)  Assessment & Plan:  Chronic and stable  Patient requesting repeat testosterone levels today.  Continue routine follow-up as scheduled    Orders:  -     Testosterone; Future    2. Benign prostatic hyperplasia with nocturia  Assessment & Plan:  Patient previously reported finasteride was contributing to decreased libido.  He was switched to dutasteride, though continues to report similar concerns.  He inquires about Flomax, though discussed he is currently on doxazosin which is a alpha 1 adrenergic antagonist, similar to Flomax.  Will continue dutasteride, and have advised patient to inquire about use of Flomax with cardiology.    Orders:  -     dutasteride (Avodart) 0.5 MG capsule; Take 1 capsule by mouth Daily.    3. Paroxysmal atrial fibrillation  Assessment & Plan:  Followed closely by cardiology, Dr. Wood.   Recommended discussing Flomax with cardiology as he is currently taking doxazosin and these are both alpha 1 adrenergic antagonist.      4. Essential hypertension  Assessment & Plan:  Hypertension is stable and controlled  Continue current treatment regimen.  Blood pressure will be reassessed  next routine visit .  Recommended discussing Flomax with cardiology as he is also taking doxazosin             Follow Up:   No follow-ups on file.    Patient or patient representative verbalized consent for the use of Ambient Listening during the visit with  Kym Patel PA-C for chart documentation. 5/24/2025  13:58 EDT    Kym DOUGLASS" DARRELL Patel    [facility] [place]

## 2025-05-24 NOTE — ASSESSMENT & PLAN NOTE
Chronic and stable  Patient requesting repeat testosterone levels today.  Continue routine follow-up as scheduled  
Followed closely by cardiology, Dr. Wood.   Recommended discussing Flomax with cardiology as he is currently taking doxazosin and these are both alpha 1 adrenergic antagonist.  
Hypertension is stable and controlled  Continue current treatment regimen.  Blood pressure will be reassessed  next routine visit .  Recommended discussing Flomax with cardiology as he is also taking doxazosin  
Patient previously reported finasteride was contributing to decreased libido.  He was switched to dutasteride, though continues to report similar concerns.  He inquires about Flomax, though discussed he is currently on doxazosin which is a alpha 1 adrenergic antagonist, similar to Flomax.  Will continue dutasteride, and have advised patient to inquire about use of Flomax with cardiology.  
I have reviewed and confirmed nurses' notes...

## 2025-05-29 ENCOUNTER — OFFICE VISIT (OUTPATIENT)
Dept: NEUROSURGERY | Facility: CLINIC | Age: 75
End: 2025-05-29
Payer: MEDICARE

## 2025-05-29 VITALS
BODY MASS INDEX: 27 KG/M2 | WEIGHT: 172 LBS | SYSTOLIC BLOOD PRESSURE: 110 MMHG | HEIGHT: 67 IN | DIASTOLIC BLOOD PRESSURE: 62 MMHG | TEMPERATURE: 98 F

## 2025-05-29 DIAGNOSIS — M47.12 CERVICAL SPONDYLOSIS WITH MYELOPATHY: Primary | ICD-10-CM

## 2025-05-29 PROCEDURE — 99214 OFFICE O/P EST MOD 30 MIN: CPT | Performed by: PHYSICIAN ASSISTANT

## 2025-05-29 PROCEDURE — 3074F SYST BP LT 130 MM HG: CPT | Performed by: PHYSICIAN ASSISTANT

## 2025-05-29 PROCEDURE — 3078F DIAST BP <80 MM HG: CPT | Performed by: PHYSICIAN ASSISTANT

## 2025-05-29 NOTE — PROGRESS NOTES
Patient: Rey Garrison Jr.  : 1950    Primary Care Provider: Rory Ayers MD    Chief Complaint: Gait ataxia and bilateral hand numbness    History of Present Illness:        Patient is a very nice 74-year-old gentleman known to the neurosurgical practice for having cervical spondylitic myelopathy and requiring a C5-C7 anterior cervical discectomy and fusion.  The procedure was performed in 2018 procedure without event.  Patient maintained some issues with myelopathic symptoms and has noticed over the last couple years he has developed some worsening proprioception of his lower extremities feeling as though he is having to throw his feet forward.  Patient has no pain and no radicular symptoms.     Patient did have a critical illness with his lung resection requiring a rehab stabilization after asystole and CODE BLUE and a 7-day intubation.    Since procedure he has had numbness in his hands and some gait ataxia and some of the mild/vague symptoms of myelopathy but he notes that they have been getting somewhat worse.  This has been stable since the last visit we had been April.    Patient had an EMG that ruled out any demyelinating disease or worsening peripheral neuropathy so I had a MRI of the cervical spine ordered to check the adjacent level close to the hardware.    Patient also had a cervical flexion-extension x-ray done and is here for review of those    Review of Systems   Constitutional:  Negative for activity change, appetite change, chills, diaphoresis, fatigue, fever and unexpected weight change.   HENT:  Negative for congestion, dental problem, drooling, ear discharge, ear pain, facial swelling, hearing loss, mouth sores, nosebleeds, postnasal drip, rhinorrhea, sinus pressure, sinus pain, sneezing, sore throat, tinnitus, trouble swallowing and voice change.    Eyes:  Negative for photophobia, pain, discharge, redness, itching and visual disturbance.   Respiratory:  Negative for apnea,  cough, choking, chest tightness, shortness of breath, wheezing and stridor.    Cardiovascular:  Negative for chest pain, palpitations and leg swelling.   Gastrointestinal:  Negative for abdominal distention, abdominal pain, anal bleeding, blood in stool, constipation, diarrhea, nausea, rectal pain and vomiting.   Endocrine: Negative for cold intolerance, heat intolerance, polydipsia, polyphagia and polyuria.   Genitourinary:  Negative for decreased urine volume, difficulty urinating, dysuria, enuresis, flank pain, frequency, genital sores, hematuria, penile discharge, penile pain, penile swelling, scrotal swelling, testicular pain and urgency.   Musculoskeletal:  Positive for gait problem. Negative for arthralgias, back pain, joint swelling, myalgias, neck pain and neck stiffness.   Skin:  Negative for color change, pallor, rash and wound.   Allergic/Immunologic: Negative for environmental allergies, food allergies and immunocompromised state.   Neurological:  Positive for numbness. Negative for dizziness, tremors, seizures, syncope, facial asymmetry, speech difficulty, weakness, light-headedness and headaches.   Hematological:  Negative for adenopathy. Does not bruise/bleed easily.   Psychiatric/Behavioral:  Negative for agitation, behavioral problems, confusion, decreased concentration, dysphoric mood, hallucinations, self-injury, sleep disturbance and suicidal ideas. The patient is not nervous/anxious and is not hyperactive.        Past Medical History:     Past Medical History:   Diagnosis Date    Asthma     Benign prostatic hypertrophy     Cervical disc disorder     Claustrophobia     COPD (chronic obstructive pulmonary disease)     Coughing up blood     History of    Depression with anxiety     Dermatitis     Hearing loss     Hiatal hernia     History of acute pharyngitis     History of histoplasmosis     Hypertension     Left inguinal hernia     Lung cancer     Malignant neoplasm of lower lobe, right bronchus  "or lung 04/15/2022    Mediastinal lymphadenopathy     Myocardial infarction     Sciatica of right side     Tobacco abuse     Type 2 diabetes mellitus        Family History:     Family History   Problem Relation Age of Onset    Dementia Mother     Prostate cancer Father     Cancer Father         Prostate    Hearing loss Father        Social History:    reports that he quit smoking about 3 years ago. His smoking use included cigarettes. He started smoking about 58 years ago. He has a 55.2 pack-year smoking history. He has been exposed to tobacco smoke. He has never used smokeless tobacco. He reports that he does not currently use alcohol after a past usage of about 5.0 standard drinks of alcohol per week. He reports current drug use. Frequency: 2.00 times per week. Drug: Marijuana.   SMOKING STATUS: Non-smoker    Surgical History:     Past Surgical History:   Procedure Laterality Date    ANTERIOR CERVICAL DISCECTOMY W/ FUSION Bilateral 10/19/2018    Procedure: CERVICAL DISCECTOMY ANTERIOR WITH FUSION C5-7;  Surgeon: Ata Stoddard MD;  Location: [facility];  Service: Neurosurgery    COLONOSCOPY  2020?    COLONOSCOPY W/ POLYPECTOMY      Pathology consistent with hyperplastic polyp    HERNIA REPAIR      INGUINAL HERNIA REPAIR  2014    KNEE ARTHROSCOPY W/ MENISCAL REPAIR      Medial and lateral meniscus repair    LUNG CANCER SURGERY Right 04/2022    NASAL POLYP EXCISION      NECK SURGERY         Allergies:   Alprazolam, Penicillin g, Sulfamethoxazole-trimethoprim, Penicillamine, Penicillins, and Sulfa antibiotics    Physical Exam:    Vital Signs:/62 (BP Location: Right arm, Patient Position: Sitting, Cuff Size: Adult)   Temp 98 °F (36.7 °C) (Infrared)   Ht 170.2 cm (67\")   Wt 78 kg (172 lb)   BMI 26.94 kg/m²    BMI: Body mass index is 26.94 kg/m².     GENERAL:   The patient is in no acute distress, and is able to answer all questions appropriately.  Skin:  The incision is well-healed and well approximated. "  No signs of infection, bleeding, or erythema.  Musculoskeletal:     strength is 5 out of 5 bilaterally.   Shoulder abduction is 5 out of 5.    Dorsiflexion is 5/5 Bilaterally  Plantarflexion is 5/5 bilaterally  Hip Flexion 5/5 bilaterally.    The patient's gait is normal without antalgia.  Neurologic:   The patient is alert and oriented by 3.     Pupils are equal and reactive to light.    Visual fields are full.    Extraocular movements are intact without nystagmus.    There is no evidence of central motor drift. No facial droop.  No difficulty with rapid alternating movements.    Sensation is equal bilaterally with no deficit.      Reflexes:  4+ @ biceps, triceps, brachioradialis, as well as the patellar and Achilles tendon bilaterally.  Argueta's present bilaterally causing jerking all the way up to his shoulders bilaterally      Medical Decision Making    Data Review:   MRI of the cervical spine reviewed showing some adjacent level disease at C4-5 but there is mild to moderate neuroforaminal narrowing bilaterally but with minimal central canal narrowing and no obvious new myelomalacia of the spinal cord.    Every level above the hardware there is a slight spondylolisthesis Villelli 1 that reduces slightly would be the 1 at C3-4    Diagnosis:   Cervical spondylolisthesis  Cervical myelopathy   Status post C5 to C7 ACDF    Treatment Options:   From a neurosurgical perspective high-grade central canal narrowing has been ruled out.  Patient has not yet tried any injections which I think may benefit him with some of his symptoms that he is experiencing.    Unfortunately I think some of the ongoing numbness and ataxia issues are from his prior cervical myelopathic findings.  I do not see any high-grade movement or myelomalacia that would signify areas that would need to be decompressed.    I be happy to see him back to discuss surgical options after exhaustion of conservative measures.    Patient may be a  candidate for C4-5 ACDF          Diagnosis Plan   1. Cervical spondylosis with myelopathy  Ambulatory Referral to Pain Management

## 2025-05-30 DIAGNOSIS — E29.1 HYPOGONADISM IN MALE: ICD-10-CM

## 2025-05-30 DIAGNOSIS — R79.89 LOW TESTOSTERONE IN MALE: ICD-10-CM

## 2025-05-30 NOTE — TELEPHONE ENCOUNTER
"Caller: Rey Garrison Jr. \"Ruben\"    Relationship: Self    Best call back number:     [phone] (Mobile)     Requested Prescriptions:   Requested Prescriptions     Pending Prescriptions Disp Refills    Testosterone Cypionate (DEPOTESTOTERONE CYPIONATE) 200 MG/ML injection 4 mL 0     Sig: INJECT 1.5 ML EVERY 14 DAYS. Need appointment for refills      Pharmacy where request should be sent:     [facility]/PHARMACY #[id number] - [address] [phone] [place] [phone]      Last office visit with prescribing clinician: 4/8/2025   Last telemedicine visit with prescribing clinician: Visit date not found   Next office visit with prescribing clinician: 7/8/2025     Additional details provided by patient:     PATIENT STATED HE IS OUT OF THE MEDICATION    Does the patient have less than a 3 day supply:  [x] Yes  [] No    Would you like a call back once the refill request has been completed: [] Yes [] No    If the office needs to give you a call back, can they leave a voicemail: [] Yes [] No    Remberto Morales Rep   05/30/25 14:26 EDT     "

## 2025-06-02 ENCOUNTER — TELEPHONE (OUTPATIENT)
Dept: FAMILY MEDICINE CLINIC | Facility: CLINIC | Age: 75
End: 2025-06-02
Payer: COMMERCIAL

## 2025-06-02 RX ORDER — TESTOSTERONE CYPIONATE 200 MG/ML
INJECTION, SOLUTION INTRAMUSCULAR
Qty: 4 ML | Refills: 0 | Status: SHIPPED | OUTPATIENT
Start: 2025-06-02

## 2025-06-10 DIAGNOSIS — Z76.0 MEDICATION REFILL: ICD-10-CM

## 2025-06-10 DIAGNOSIS — J44.9 CHRONIC OBSTRUCTIVE PULMONARY DISEASE, UNSPECIFIED COPD TYPE: ICD-10-CM

## 2025-06-10 NOTE — TELEPHONE ENCOUNTER
"    Caller: Rey Garrison Jr. \"Ruben\"    Relationship: Self    Best call back number: [phone]     Requested Prescriptions:   Requested Prescriptions     Pending Prescriptions Disp Refills    Fluticasone-Umeclidin-Vilant (Trelegy Ellipta) 200-62.5-25 MCG/ACT inhaler 180 each 0     Sig: Inhale 1 puff Daily.        Pharmacy where request should be sent: [facility]/PHARMACY #[id number] - [place], [place] - [address] [facility] [phone] [facility] [phone]      Last office visit with prescribing clinician: 4/8/2025   Last telemedicine visit with prescribing clinician: Visit date not found   Next office visit with prescribing clinician: 7/22/2025       Does the patient have less than a 3 day supply:  [x] Yes  [] No    Would you like a call back once the refill request has been completed: [] Yes [x] No    If the office needs to give you a call back, can they leave a voicemail: [] Yes [x] No    Remberto Santana   06/10/25 16:35 EDT       "

## 2025-06-11 RX ORDER — FLUTICASONE FUROATE, UMECLIDINIUM BROMIDE AND VILANTEROL TRIFENATATE 200; 62.5; 25 UG/1; UG/1; UG/1
1 POWDER RESPIRATORY (INHALATION)
Qty: 180 EACH | Refills: 0 | Status: SHIPPED | OUTPATIENT
Start: 2025-06-11

## 2025-06-25 ENCOUNTER — OFFICE VISIT (OUTPATIENT)
Dept: PAIN MEDICINE | Facility: CLINIC | Age: 75
End: 2025-06-25
Payer: MEDICARE

## 2025-06-25 VITALS — WEIGHT: 174.6 LBS | BODY MASS INDEX: 27.4 KG/M2 | HEIGHT: 67 IN

## 2025-06-25 DIAGNOSIS — M54.12 CERVICAL RADICULOPATHY: Primary | ICD-10-CM

## 2025-06-25 DIAGNOSIS — G89.4 CHRONIC PAIN SYNDROME: ICD-10-CM

## 2025-06-25 DIAGNOSIS — M47.812 CERVICAL SPONDYLOSIS WITHOUT MYELOPATHY: ICD-10-CM

## 2025-06-25 PROCEDURE — 1160F RVW MEDS BY RX/DR IN RCRD: CPT | Performed by: STUDENT IN AN ORGANIZED HEALTH CARE EDUCATION/TRAINING PROGRAM

## 2025-06-25 PROCEDURE — 1125F AMNT PAIN NOTED PAIN PRSNT: CPT | Performed by: STUDENT IN AN ORGANIZED HEALTH CARE EDUCATION/TRAINING PROGRAM

## 2025-06-25 PROCEDURE — 1159F MED LIST DOCD IN RCRD: CPT | Performed by: STUDENT IN AN ORGANIZED HEALTH CARE EDUCATION/TRAINING PROGRAM

## 2025-06-25 PROCEDURE — 99204 OFFICE O/P NEW MOD 45 MIN: CPT | Performed by: STUDENT IN AN ORGANIZED HEALTH CARE EDUCATION/TRAINING PROGRAM

## 2025-06-25 NOTE — PROGRESS NOTES
Referring Physician: Kilo Cazares PA-C  [address] [facility] [address]  [place] [id number]    Primary Physician: Rory Ayers MD    CHIEF COMPLAINT or REASON FOR VISIT: Neck Pain      Initial history of present illness on 06/25/2025:  Mr. Rey Garrison is 75 y.o. male who presents as a new patient referral for evaluation and treatment of chronic right-sided neck pain.  Mr. Garrison has a fairly complicated medical history significant for cervical spinal canal stenosis status post ACDF with residual symptoms of myelopathy.  He has difficulty with his gait as well as proprioception.  He does complain of some right-sided neck pain especially with turning his head.  He does additionally have a prior history significant for lung cancer status post lobectomy complicated by cardiac arrest in 2022.  He denies any numbness or tingling of the hands or feet.  He denies any radicular symptoms into the extremities.  His primary complaint is a focal point of pain along the right side of his neck which is exacerbated with right lateral rotation.  He has found a pillow which is significantly improved his symptoms.  Approximate 2 years ago he would have stated that his pain was severe however he states that his much better controlled with proper positioning.    Interval history:    Interventions:      Objective Pain Scoring:   BRIEF PAIN INVENTORY:  Total score:   Pain Score    06/25/25 1011   PainSc: 1    PainLoc: Neck      PHQ-2: 1  PHQ-9:    Opioid Risk Tool:         Review of Systems:   ROS negative except as otherwise noted     Past Medical History:   Past Medical History:   Diagnosis Date    Asthma     Benign prostatic hypertrophy     Cervical disc disorder     Claustrophobia     COPD (chronic obstructive pulmonary disease)     Coughing up blood     History of    Depression with anxiety     Dermatitis     Hearing loss     Hiatal hernia     History of acute pharyngitis     History of histoplasmosis     Hypertension      Left inguinal hernia     Lung cancer     Malignant neoplasm of lower lobe, right bronchus or lung 04/15/2022    Mediastinal lymphadenopathy     Myocardial infarction     Sciatica of right side     Tobacco abuse     Type 2 diabetes mellitus          Past Surgical History:   Past Surgical History:   Procedure Laterality Date    ANTERIOR CERVICAL DISCECTOMY W/ FUSION Bilateral 10/19/2018    Procedure: CERVICAL DISCECTOMY ANTERIOR WITH FUSION C5-7;  Surgeon: Ata Stoddard MD;  Location: [facility];  Service: Neurosurgery    COLONOSCOPY  2020?    COLONOSCOPY W/ POLYPECTOMY      Pathology consistent with hyperplastic polyp    HERNIA REPAIR      INGUINAL HERNIA REPAIR  2014    KNEE ARTHROSCOPY W/ MENISCAL REPAIR      Medial and lateral meniscus repair    LUNG CANCER SURGERY Right 04/2022    NASAL POLYP EXCISION      NECK SURGERY           Family History   Family History   Problem Relation Age of Onset    Dementia Mother     Prostate cancer Father     Cancer Father         Prostate    Hearing loss Father          Social History   Social History     Socioeconomic History    Marital status: Significant Other   Tobacco Use    Smoking status: Former     Current packs/day: 0.00     Average packs/day: 1 pack/day for 55.2 years (55.2 ttl pk-yrs)     Types: Cigarettes     Start date: 1/1/1967     Quit date: 3/3/2022     Years since quitting: 3.3     Passive exposure: Past    Smokeless tobacco: Never   Vaping Use    Vaping status: Never Used   Substance and Sexual Activity    Alcohol use: Not Currently     Alcohol/week: 5.0 standard drinks of alcohol     Types: 5 Standard drinks or equivalent per week     Comment: rarely    Drug use: Yes     Frequency: 2.0 times per week     Types: Marijuana     Comment: social use; last use 10-17-18    Sexual activity: Yes     Partners: Female        Medications:     Current Outpatient Medications:     albuterol sulfate  (90 Base) MCG/ACT inhaler, INHALE 2 PUFFS EVERY 4 HOURS AS NEEDED  "FOR WHEEZING OR SHORTNESS OF AIR, Disp: 16 g, Rfl: 1    clobetasol (TEMOVATE) 0.05 % external solution, APPLY TO AFFECTED AREA X 2 WEEKS AND THEN STOP. CAN RESTART IN 2 WEEKS IF NEEDED. AVOID FACE, Disp: , Rfl:     clobetasol propionate (CLOBEX) 0.05 % shampoo, APPLY TO SCALP 2-3 TIMES A WEEK. LET SIT FOR A FEW MINUTES BEFORE RINSING., Disp: , Rfl:     dilTIAZem HCl ER (CARDIZEM LA) 180 MG 24 hr tablet, Take 1 tablet by mouth Daily., Disp: , Rfl:     doxazosin (CARDURA) 4 MG tablet, Take 1.5 tablets by mouth Daily., Disp: 135 tablet, Rfl: 3    dutasteride (Avodart) 0.5 MG capsule, Take 1 capsule by mouth Daily., Disp: , Rfl:     famotidine (PEPCID) 20 MG tablet, 1 tablet., Disp: , Rfl:     fluocinolone acetonide (DERMOTIC) 0.01 % oil otic oil, Administer  into both ears 2 (Two) Times a Day., Disp: 20 mL, Rfl: 5    fluocinonide (LIDEX) 0.05 % external solution, APPLY TOPICALLY TO SCALP EVERY DAY, Disp: , Rfl:     Fluticasone-Umeclidin-Vilant (Trelegy Ellipta) 200-62.5-25 MCG/ACT inhaler, Inhale 1 puff Daily., Disp: 180 each, Rfl: 0    Multiple Vitamin (MULTI-VITAMIN DAILY PO), Take 1 tablet by mouth Daily., Disp: , Rfl:     Testosterone Cypionate (DEPOTESTOTERONE CYPIONATE) 200 MG/ML injection, INJECT 1.5 ML EVERY 14 DAYS. Need appointment for refills, Disp: 4 mL, Rfl: 0    sildenafil (VIAGRA) 100 MG tablet, Take 1 tablet by mouth Daily As Needed for Erectile Dysfunction., Disp: 15 tablet, Rfl: 5        Physical Exam:     Vitals:    06/25/25 1011   Weight: 79.2 kg (174 lb 9.6 oz)   Height: 170.2 cm (67\")   PainSc: 1    PainLoc: Neck        General: Alert and oriented, No acute distress.   HEENT: Normocephalic, atraumatic.   Cardiovascular: No gross edema  Respiratory: Respirations are non-labored    Cervical Spine:   No masses or atrophy  Range of motion - Flexion normal. Extension normal. Lateral rotation reduced.   Palpation - nontender   Spurling's -positive right       Motor Exam:    Strength: Rate on 1-5 scale " Right Left    C5-Elbow flexion, Deltoid 5/5  5/5    C6-Wrist extension 5/5  5/5    C7- Elbow / finger extension 5/5  5/5    C8- Finger flexion 5/5  5/5    T1- Intrinsics hand 5/5  5/5       Sensory Exam: Full and equal sensation to light touch throughout.       Neurologic: Cranial Nerves II-XII are grossly intact.   Argueta’s -positive bilaterally     Psychiatric: Cooperative.     Imaging Studies:   No results found for this or any previous visit.        06/25/2025  Independent review of radiographic imaging: Available for my interpretation is a cervical MRI dated April 19, 2025 demonstrating C5-C7 ACDF.  Canal is patent.  There is severe neuroforaminal stenosis throughout the cervical spine bilaterally.  Cord lesion at approximately C6    Impression & Plan:       06/25/2025: Rey Garrison is a 75 y.o. male with past medical history significant for DM2, tobacco abuse, atrial fibrillation, low back pain, history of cardiac arrest who presents to the pain clinic for evaluation and treatment of chronic axial neck pain.  MRI interpretation above.  Evaluation consistent with cervical radiculopathy, cervical spondylosis.  At present his symptoms are well-controlled however if pain worsens can consider epidural steroid injection, CMBB/RFA.    1. Cervical radiculopathy    2. Cervical spondylosis without myelopathy    3. Chronic pain syndrome        PLAN:  1. Medications: Recommend Voltaren topical, NSAIDs, Tylenol.  Can trial turmeric 500 mg twice daily if NSAID contraindicated.    2. Physical Therapy: Continue HEP    3. Psychological: defer    4. Complementary and alternative (CAM) Therapies:     5. Labs/Diagnostic studies: None indicated     6. Imaging: MRI independently interpreted and reviewed with patient    7. Interventions: None indicated at this time.  Consider JIM MCKEONB if pain worsens    8. Referrals: None indicated     9. Records: Reviewed neurosurgery notes    10. Lifestyle goals:    Follow-up as  [facility]  Jed Lemon MD          Quality Metrics:                Please note that portions of this note were completed with a voice recognition program.      Any copied data in any portion of my note from previous notes included in the HPI, PE, MDM and/or assessment and plan has been reviewed by myself and accurate as of this date.      The 21st Century Cures Act makes medical notes like this available to patients in the interest of transparency. This is a medical document intended as peer to peer communication. It is written in medical language and may contain abbreviations or verbiage that are unfamiliar. It may appear blunt or direct. Medical documents are intended to carry relevant information, facts as evident, and the clinical opinion of the practitioner.

## 2025-07-04 DIAGNOSIS — Z76.0 MEDICATION REFILL: ICD-10-CM

## 2025-07-04 DIAGNOSIS — J44.9 CHRONIC OBSTRUCTIVE PULMONARY DISEASE, UNSPECIFIED COPD TYPE: ICD-10-CM

## 2025-07-08 RX ORDER — ALBUTEROL SULFATE 90 UG/1
2 INHALANT RESPIRATORY (INHALATION) EVERY 4 HOURS PRN
Qty: 17 G | Refills: 1 | Status: SHIPPED | OUTPATIENT
Start: 2025-07-08

## (undated) DEVICE — GLOW 'N TELL 30CM TAPE (20 STRIPS): Brand: VASCUTAPE RADIOPAQUE TAPE

## (undated) DEVICE — NDL HYPO ECLPS SFTY 25G 1 1/2IN

## (undated) DEVICE — MAGNETIC DRAPE: Brand: DEVON

## (undated) DEVICE — MEDI-VAC YANKAUER SUCTION HANDLE W/BULBOUS TIP: Brand: CARDINAL HEALTH

## (undated) DEVICE — SUCTION CANISTER, 2500CC, RIGID: Brand: DEROYAL

## (undated) DEVICE — SUT SILK 2/0 TIES 18IN A185H

## (undated) DEVICE — MEDI-VAC NON-CONDUCTIVE SUCTION TUBING: Brand: CARDINAL HEALTH

## (undated) DEVICE — DRP MICROSCOP ELIMINATES GLAS COVR

## (undated) DEVICE — INSTRUMENT 875-088 14MM DSTRCT PIN STRL: Brand: MEDTRONIC REUSABLE INSTRUMENT

## (undated) DEVICE — ANTIBACTERIAL UNDYED BRAIDED (POLYGLACTIN 910), SYNTHETIC ABSORBABLE SUTURE: Brand: COATED VICRYL

## (undated) DEVICE — CANNULA,OXY,ADULT,SUPERSOFT,W/7'TUB,UC: Brand: MEDLINE

## (undated) DEVICE — DRP SLUSH WARM STRL 44X44IN

## (undated) DEVICE — JACKSON-PRATT 100CC BULB RESERVOIR: Brand: CARDINAL HEALTH

## (undated) DEVICE — PK NEURO DISC 10

## (undated) DEVICE — DRSNG WND BORDR/ADHS NONADHR/GZ LF 4X4IN STRL

## (undated) DEVICE — APPL CHLORAPREP W/TINT 26ML BLU

## (undated) DEVICE — AIRWY 90MM NO9

## (undated) DEVICE — DISPOSABLE BIPOLAR FORCEPS 7 3/4" (19.7CM) SCOVILLE BAYONET, INSULATED, 1.5MM TIP AND 12 FT. (3.6M) CABLE: Brand: KIRWAN

## (undated) DEVICE — NDL SPINE 20G 3 1/2 YEL STRL 1P/U

## (undated) DEVICE — C-ARM: Brand: UNBRANDED

## (undated) DEVICE — KITTNER SPONGE: Brand: DEROYAL

## (undated) DEVICE — JP PERF DRN SIL FLT 7MM FULL: Brand: CARDINAL HEALTH

## (undated) DEVICE — ADHS LIQ MASTISOL 2/3ML

## (undated) DEVICE — ST PRIM GRVTY NDLESS 3 INJ PORT 105IN

## (undated) DEVICE — SURGICAL INSTRUMENTS COMPRESSION PIN 14MM
Type: IMPLANTABLE DEVICE | Site: SPINE CERVICAL | Status: NON-FUNCTIONAL
Removed: 2018-10-19

## (undated) DEVICE — ENCORE® LATEX MICRO SIZE 8, STERILE LATEX POWDER-FREE SURGICAL GLOVE: Brand: ENCORE

## (undated) DEVICE — RESTRNT LIMB FOAM 2STRAP

## (undated) DEVICE — 3M™ STERI-DRAPE™ INSTRUMENT POUCH 1018: Brand: STERI-DRAPE™

## (undated) DEVICE — ELECTRD BLD EDGE/INSUL1P 2.4X5.1MM STRL

## (undated) DEVICE — TOOL 14MH30 LEGEND 14CM 3MM: Brand: MIDAS REX ™

## (undated) DEVICE — SKYLINE ANTERIOR CERVICAL PLATE SYSTEM DRILL 2.2 X 12MM: Brand: SKYLINE

## (undated) DEVICE — 3M™ STERI-STRIP™ REINFORCED ADHESIVE SKIN CLOSURES, R1547, 1/2 IN X 4 IN (12 MM X 100 MM), 6 STRIPS/ENVELOPE: Brand: 3M™ STERI-STRIP™

## (undated) DEVICE — RUBBERBAND LF STRL PK/2

## (undated) DEVICE — SYR CONTRL LUERLOK 10CC

## (undated) DEVICE — PAD GRND REM POLYHESIVE A/ DISP

## (undated) DEVICE — SOL LR 1000ML

## (undated) DEVICE — GLV SURG BIOGEL LTX PF 8

## (undated) DEVICE — SPNG GZ WOVN 4X4IN 12PLY 10/BX STRL